# Patient Record
Sex: MALE | Race: BLACK OR AFRICAN AMERICAN | Employment: OTHER | URBAN - METROPOLITAN AREA
[De-identification: names, ages, dates, MRNs, and addresses within clinical notes are randomized per-mention and may not be internally consistent; named-entity substitution may affect disease eponyms.]

---

## 2019-02-05 ENCOUNTER — OFFICE VISIT (OUTPATIENT)
Dept: ENT CLINIC | Age: 64
End: 2019-02-05
Payer: COMMERCIAL

## 2019-02-05 VITALS
HEART RATE: 69 BPM | HEIGHT: 70 IN | SYSTOLIC BLOOD PRESSURE: 127 MMHG | WEIGHT: 241.8 LBS | DIASTOLIC BLOOD PRESSURE: 79 MMHG | BODY MASS INDEX: 34.62 KG/M2 | TEMPERATURE: 98.1 F

## 2019-02-05 DIAGNOSIS — H61.23 BILATERAL IMPACTED CERUMEN: ICD-10-CM

## 2019-02-05 DIAGNOSIS — R22.1 NECK SWELLING: Primary | ICD-10-CM

## 2019-02-05 PROCEDURE — G8427 DOCREV CUR MEDS BY ELIG CLIN: HCPCS | Performed by: OTOLARYNGOLOGY

## 2019-02-05 PROCEDURE — G8417 CALC BMI ABV UP PARAM F/U: HCPCS | Performed by: OTOLARYNGOLOGY

## 2019-02-05 PROCEDURE — 99202 OFFICE O/P NEW SF 15 MIN: CPT | Performed by: OTOLARYNGOLOGY

## 2019-02-05 PROCEDURE — 1036F TOBACCO NON-USER: CPT | Performed by: OTOLARYNGOLOGY

## 2019-02-05 PROCEDURE — 69210 REMOVE IMPACTED EAR WAX UNI: CPT | Performed by: OTOLARYNGOLOGY

## 2019-02-05 PROCEDURE — 3017F COLORECTAL CA SCREEN DOC REV: CPT | Performed by: OTOLARYNGOLOGY

## 2019-02-05 PROCEDURE — G8484 FLU IMMUNIZE NO ADMIN: HCPCS | Performed by: OTOLARYNGOLOGY

## 2019-02-05 RX ORDER — OMEGA-3 FATTY ACIDS/FISH OIL 300-1000MG
1 CAPSULE ORAL DAILY
COMMUNITY

## 2019-02-05 RX ORDER — SENNOSIDES 8.8 MG/5ML
5 LIQUID ORAL NIGHTLY
COMMUNITY
End: 2019-08-08 | Stop reason: CLARIF

## 2019-02-05 RX ORDER — DIAPER,BRIEF,INFANT-TODD,DISP
EACH MISCELLANEOUS
COMMUNITY

## 2019-02-05 ASSESSMENT — ENCOUNTER SYMPTOMS
TROUBLE SWALLOWING: 0
PHOTOPHOBIA: 0
SINUS PRESSURE: 0
SORE THROAT: 0
CHOKING: 0
WHEEZING: 0
FACIAL SWELLING: 0
RHINORRHEA: 0
EYE ITCHING: 0
COUGH: 0
BACK PAIN: 0
SHORTNESS OF BREATH: 0
SINUS PAIN: 0
BLOOD IN STOOL: 0
STRIDOR: 0
DIARRHEA: 0
COLOR CHANGE: 0
VOMITING: 0
VOICE CHANGE: 0
EYE DISCHARGE: 0
CONSTIPATION: 0
NAUSEA: 0

## 2019-08-08 ENCOUNTER — HOSPITAL ENCOUNTER (INPATIENT)
Age: 64
LOS: 5 days | Discharge: HOME | DRG: 208 | End: 2019-08-13
Attending: EMERGENCY MEDICINE | Admitting: INTERNAL MEDICINE
Payer: COMMERCIAL

## 2019-08-08 ENCOUNTER — APPOINTMENT (OUTPATIENT)
Dept: GENERAL RADIOLOGY | Age: 64
DRG: 208 | End: 2019-08-08
Payer: COMMERCIAL

## 2019-08-08 ENCOUNTER — APPOINTMENT (OUTPATIENT)
Dept: CT IMAGING | Age: 64
DRG: 208 | End: 2019-08-08
Payer: COMMERCIAL

## 2019-08-08 DIAGNOSIS — J96.22 ACUTE ON CHRONIC RESPIRATORY FAILURE WITH HYPERCAPNIA (HCC): Primary | ICD-10-CM

## 2019-08-08 DIAGNOSIS — J96.02 ACUTE HYPERCAPNIC RESPIRATORY FAILURE (HCC): ICD-10-CM

## 2019-08-08 DIAGNOSIS — R41.82 ALTERED MENTAL STATUS, UNSPECIFIED ALTERED MENTAL STATUS TYPE: ICD-10-CM

## 2019-08-08 PROBLEM — J96.90 RESPIRATORY FAILURE (HCC): Status: ACTIVE | Noted: 2019-08-08

## 2019-08-08 LAB
ALBUMIN SERPL-MCNC: 3.7 G/DL (ref 3.4–5)
ALP BLD-CCNC: 84 U/L (ref 40–129)
ALT SERPL-CCNC: 53 U/L (ref 10–40)
AMMONIA: 76 UMOL/L (ref 16–60)
AMPHETAMINE SCREEN, URINE: ABNORMAL
ANION GAP SERPL CALCULATED.3IONS-SCNC: 6 MMOL/L (ref 3–16)
ANION GAP SERPL CALCULATED.3IONS-SCNC: 7 MMOL/L (ref 3–16)
AST SERPL-CCNC: 23 U/L (ref 15–37)
BACTERIA: ABNORMAL /HPF
BARBITURATE SCREEN URINE: ABNORMAL
BASE EXCESS ARTERIAL: 10 (ref -3–3)
BASE EXCESS ARTERIAL: 11 (ref -3–3)
BASE EXCESS ARTERIAL: 11 (ref -3–3)
BASE EXCESS ARTERIAL: 12 (ref -3–3)
BASE EXCESS ARTERIAL: 5 (ref -3–3)
BASE EXCESS ARTERIAL: 7 (ref -3–3)
BASE EXCESS VENOUS: 10 (ref -3–3)
BASE EXCESS VENOUS: 6 (ref -3–3)
BASE EXCESS VENOUS: 6 (ref -3–3)
BASOPHILS ABSOLUTE: 0 K/UL (ref 0–0.2)
BASOPHILS RELATIVE PERCENT: 0.3 %
BENZODIAZEPINE SCREEN, URINE: ABNORMAL
BILIRUB SERPL-MCNC: <0.2 MG/DL (ref 0–1)
BILIRUBIN DIRECT: <0.2 MG/DL (ref 0–0.3)
BILIRUBIN URINE: NEGATIVE
BILIRUBIN, INDIRECT: ABNORMAL MG/DL (ref 0–1)
BLOOD, URINE: NEGATIVE
BUN BLDV-MCNC: 19 MG/DL (ref 7–20)
BUN BLDV-MCNC: 19 MG/DL (ref 7–20)
CALCIUM IONIZED: 1.29 MMOL/L (ref 1.12–1.32)
CALCIUM IONIZED: 1.34 MMOL/L (ref 1.12–1.32)
CALCIUM IONIZED: 1.35 MMOL/L (ref 1.12–1.32)
CALCIUM IONIZED: 1.36 MMOL/L (ref 1.12–1.32)
CALCIUM IONIZED: 1.37 MMOL/L (ref 1.12–1.32)
CALCIUM SERPL-MCNC: 9.3 MG/DL (ref 8.3–10.6)
CALCIUM SERPL-MCNC: 9.7 MG/DL (ref 8.3–10.6)
CANNABINOID SCREEN URINE: ABNORMAL
CHLORIDE BLD-SCNC: 103 MMOL/L (ref 99–110)
CHLORIDE BLD-SCNC: 106 MMOL/L (ref 99–110)
CLARITY: CLEAR
CO2: 31 MMOL/L (ref 21–32)
CO2: 32 MMOL/L (ref 21–32)
COCAINE METABOLITE SCREEN URINE: ABNORMAL
COLOR: YELLOW
CORTISOL TOTAL: 18.7 UG/DL
CREAT SERPL-MCNC: 1.2 MG/DL (ref 0.8–1.3)
CREAT SERPL-MCNC: 1.3 MG/DL (ref 0.8–1.3)
EKG ATRIAL RATE: 63 BPM
EKG DIAGNOSIS: NORMAL
EKG P AXIS: 63 DEGREES
EKG P-R INTERVAL: 190 MS
EKG Q-T INTERVAL: 566 MS
EKG QRS DURATION: 238 MS
EKG QTC CALCULATION (BAZETT): 579 MS
EKG R AXIS: 269 DEGREES
EKG T AXIS: 39 DEGREES
EKG VENTRICULAR RATE: 63 BPM
EOSINOPHILS ABSOLUTE: 0.1 K/UL (ref 0–0.6)
EOSINOPHILS RELATIVE PERCENT: 2.2 %
GFR AFRICAN AMERICAN: >60
GFR AFRICAN AMERICAN: >60
GFR NON-AFRICAN AMERICAN: 55
GFR NON-AFRICAN AMERICAN: >60
GLUCOSE BLD-MCNC: 124 MG/DL (ref 70–99)
GLUCOSE BLD-MCNC: 126 MG/DL (ref 70–99)
GLUCOSE BLD-MCNC: 128 MG/DL (ref 70–99)
GLUCOSE BLD-MCNC: 128 MG/DL (ref 70–99)
GLUCOSE BLD-MCNC: 129 MG/DL (ref 70–99)
GLUCOSE BLD-MCNC: 132 MG/DL (ref 70–99)
GLUCOSE BLD-MCNC: 145 MG/DL (ref 70–99)
GLUCOSE URINE: NEGATIVE MG/DL
HCO3 ARTERIAL: 33 MMOL/L (ref 21–29)
HCO3 ARTERIAL: 35.1 MMOL/L (ref 21–29)
HCO3 ARTERIAL: 37 MMOL/L (ref 21–29)
HCO3 ARTERIAL: 38.5 MMOL/L (ref 21–29)
HCO3 ARTERIAL: 38.6 MMOL/L (ref 21–29)
HCO3 ARTERIAL: 39.5 MMOL/L (ref 21–29)
HCO3 VENOUS: 33.1 MMOL/L (ref 23–29)
HCO3 VENOUS: 33.7 MMOL/L (ref 23–29)
HCO3 VENOUS: 37.5 MMOL/L (ref 23–29)
HCT VFR BLD CALC: 37.5 % (ref 40.5–52.5)
HEMOGLOBIN: 11.5 G/DL (ref 13.5–17.5)
KETONES, URINE: NEGATIVE MG/DL
LACTATE: 0.39 MMOL/L (ref 0.4–2)
LACTATE: 0.44 MMOL/L (ref 0.4–2)
LACTATE: 0.45 MMOL/L (ref 0.4–2)
LACTATE: 0.58 MMOL/L (ref 0.4–2)
LACTATE: 0.71 MMOL/L (ref 0.4–2)
LACTATE: 0.96 MMOL/L (ref 0.4–2)
LACTATE: 0.98 MMOL/L (ref 0.4–2)
LEUKOCYTE ESTERASE, URINE: NEGATIVE
LYMPHOCYTES ABSOLUTE: 1 K/UL (ref 1–5.1)
LYMPHOCYTES RELATIVE PERCENT: 15.6 %
Lab: ABNORMAL
MCH RBC QN AUTO: 29 PG (ref 26–34)
MCHC RBC AUTO-ENTMCNC: 30.6 G/DL (ref 31–36)
MCV RBC AUTO: 94.9 FL (ref 80–100)
METHADONE SCREEN, URINE: ABNORMAL
MICROSCOPIC EXAMINATION: YES
MONOCYTES ABSOLUTE: 0.4 K/UL (ref 0–1.3)
MONOCYTES RELATIVE PERCENT: 6.5 %
NEUTROPHILS ABSOLUTE: 4.9 K/UL (ref 1.7–7.7)
NEUTROPHILS RELATIVE PERCENT: 75.4 %
NITRITE, URINE: NEGATIVE
O2 SAT, ARTERIAL: 95 % (ref 93–100)
O2 SAT, ARTERIAL: 95 % (ref 93–100)
O2 SAT, ARTERIAL: 97 % (ref 93–100)
O2 SAT, ARTERIAL: 98 % (ref 93–100)
O2 SAT, ARTERIAL: 98 % (ref 93–100)
O2 SAT, ARTERIAL: 99 % (ref 93–100)
O2 SAT, VEN: 58 %
O2 SAT, VEN: 79 %
O2 SAT, VEN: 82 %
OPIATE SCREEN URINE: ABNORMAL
OXYCODONE URINE: POSITIVE
PCO2 ARTERIAL: 85.9 MM HG (ref 35–45)
PCO2 ARTERIAL: 86.2 MM HG (ref 35–45)
PCO2 ARTERIAL: 87.6 MM HG (ref 35–45)
PCO2 ARTERIAL: 91.7 MM HG (ref 35–45)
PCO2 ARTERIAL: 92.4 MM HG (ref 35–45)
PCO2 ARTERIAL: 96.7 MM HG (ref 35–45)
PCO2, VEN: 77.2 MM HG (ref 40–50)
PCO2, VEN: 84.6 MM HG (ref 40–50)
PCO2, VEN: 88.2 MM HG (ref 40–50)
PDW BLD-RTO: 16.2 % (ref 12.4–15.4)
PERFORMED ON: ABNORMAL
PH ARTERIAL: 7.18 (ref 7.35–7.45)
PH ARTERIAL: 7.22 (ref 7.35–7.45)
PH ARTERIAL: 7.22 (ref 7.35–7.45)
PH ARTERIAL: 7.23 (ref 7.35–7.45)
PH ARTERIAL: 7.23 (ref 7.35–7.45)
PH ARTERIAL: 7.24 (ref 7.35–7.45)
PH UA: 6
PH UA: 6 (ref 5–8)
PH VENOUS: 7.21 (ref 7.35–7.45)
PH VENOUS: 7.24 (ref 7.35–7.45)
PH VENOUS: 7.24 (ref 7.35–7.45)
PHENCYCLIDINE SCREEN URINE: ABNORMAL
PLATELET # BLD: 234 K/UL (ref 135–450)
PMV BLD AUTO: 9.7 FL (ref 5–10.5)
PO2 ARTERIAL: 100 MM HG (ref 75–108)
PO2 ARTERIAL: 119.7 MM HG (ref 75–108)
PO2 ARTERIAL: 127.8 MM HG (ref 75–108)
PO2 ARTERIAL: 135.4 MM HG (ref 75–108)
PO2 ARTERIAL: 170.1 MM HG (ref 75–108)
PO2 ARTERIAL: 99.3 MM HG (ref 75–108)
PO2, VEN: 37 MM HG
PO2, VEN: 54 MM HG
PO2, VEN: 59 MM HG
POC POTASSIUM: 5.1 MMOL/L (ref 3.5–5.1)
POC POTASSIUM: 5.1 MMOL/L (ref 3.5–5.1)
POC POTASSIUM: 5.3 MMOL/L (ref 3.5–5.1)
POC POTASSIUM: 5.5 MMOL/L (ref 3.5–5.1)
POC POTASSIUM: 5.7 MMOL/L (ref 3.5–5.1)
POC SAMPLE TYPE: ABNORMAL
POC SODIUM: 144 MMOL/L (ref 136–145)
POC SODIUM: 144 MMOL/L (ref 136–145)
POC SODIUM: 145 MMOL/L (ref 136–145)
POC SODIUM: 146 MMOL/L (ref 136–145)
POC SODIUM: 147 MMOL/L (ref 136–145)
POTASSIUM REFLEX MAGNESIUM: 5.8 MMOL/L (ref 3.5–5.1)
POTASSIUM SERPL-SCNC: 5.4 MMOL/L (ref 3.5–5.1)
PRO-BNP: 275 PG/ML (ref 0–124)
PROPOXYPHENE SCREEN: ABNORMAL
PROTEIN UA: ABNORMAL MG/DL
RBC # BLD: 3.95 M/UL (ref 4.2–5.9)
RBC UA: ABNORMAL /HPF (ref 0–2)
SODIUM BLD-SCNC: 141 MMOL/L (ref 136–145)
SODIUM BLD-SCNC: 144 MMOL/L (ref 136–145)
SPECIFIC GRAVITY UA: 1.02 (ref 1–1.03)
TCO2 ARTERIAL: 36 MMOL/L
TCO2 ARTERIAL: 38 MMOL/L
TCO2 ARTERIAL: 40 MMOL/L
TCO2 ARTERIAL: 41 MMOL/L
TCO2 ARTERIAL: 41 MMOL/L
TCO2 ARTERIAL: 43 MMOL/L
TCO2 CALC VENOUS: 35 MMOL/L
TCO2 CALC VENOUS: 36 MMOL/L
TCO2 CALC VENOUS: 40 MMOL/L
TOTAL PROTEIN: 7.5 G/DL (ref 6.4–8.2)
TROPONIN: <0.01 NG/ML
URINE TYPE: ABNORMAL
UROBILINOGEN, URINE: 0.2 E.U./DL
WBC # BLD: 6.5 K/UL (ref 4–11)
WBC UA: ABNORMAL /HPF (ref 0–5)

## 2019-08-08 PROCEDURE — 2580000003 HC RX 258: Performed by: STUDENT IN AN ORGANIZED HEALTH CARE EDUCATION/TRAINING PROGRAM

## 2019-08-08 PROCEDURE — 31500 INSERT EMERGENCY AIRWAY: CPT

## 2019-08-08 PROCEDURE — 6370000000 HC RX 637 (ALT 250 FOR IP): Performed by: STUDENT IN AN ORGANIZED HEALTH CARE EDUCATION/TRAINING PROGRAM

## 2019-08-08 PROCEDURE — 83880 ASSAY OF NATRIURETIC PEPTIDE: CPT

## 2019-08-08 PROCEDURE — 82803 BLOOD GASES ANY COMBINATION: CPT

## 2019-08-08 PROCEDURE — 94640 AIRWAY INHALATION TREATMENT: CPT

## 2019-08-08 PROCEDURE — 2700000000 HC OXYGEN THERAPY PER DAY

## 2019-08-08 PROCEDURE — 6360000002 HC RX W HCPCS: Performed by: STUDENT IN AN ORGANIZED HEALTH CARE EDUCATION/TRAINING PROGRAM

## 2019-08-08 PROCEDURE — 36415 COLL VENOUS BLD VENIPUNCTURE: CPT

## 2019-08-08 PROCEDURE — 71045 X-RAY EXAM CHEST 1 VIEW: CPT

## 2019-08-08 PROCEDURE — 96374 THER/PROPH/DIAG INJ IV PUSH: CPT

## 2019-08-08 PROCEDURE — 80307 DRUG TEST PRSMV CHEM ANLYZR: CPT

## 2019-08-08 PROCEDURE — 82140 ASSAY OF AMMONIA: CPT

## 2019-08-08 PROCEDURE — 99291 CRITICAL CARE FIRST HOUR: CPT

## 2019-08-08 PROCEDURE — 84484 ASSAY OF TROPONIN QUANT: CPT

## 2019-08-08 PROCEDURE — 84443 ASSAY THYROID STIM HORMONE: CPT

## 2019-08-08 PROCEDURE — 70450 CT HEAD/BRAIN W/O DYE: CPT

## 2019-08-08 PROCEDURE — 82533 TOTAL CORTISOL: CPT

## 2019-08-08 PROCEDURE — 94660 CPAP INITIATION&MGMT: CPT

## 2019-08-08 PROCEDURE — 2000000000 HC ICU R&B

## 2019-08-08 PROCEDURE — 84132 ASSAY OF SERUM POTASSIUM: CPT

## 2019-08-08 PROCEDURE — 94770 HC ETCO2 MONITOR DAILY: CPT

## 2019-08-08 PROCEDURE — 6360000002 HC RX W HCPCS

## 2019-08-08 PROCEDURE — 2580000003 HC RX 258: Performed by: EMERGENCY MEDICINE

## 2019-08-08 PROCEDURE — 87040 BLOOD CULTURE FOR BACTERIA: CPT

## 2019-08-08 PROCEDURE — 85025 COMPLETE CBC W/AUTO DIFF WBC: CPT

## 2019-08-08 PROCEDURE — 80076 HEPATIC FUNCTION PANEL: CPT

## 2019-08-08 PROCEDURE — 82947 ASSAY GLUCOSE BLOOD QUANT: CPT

## 2019-08-08 PROCEDURE — 93005 ELECTROCARDIOGRAM TRACING: CPT | Performed by: EMERGENCY MEDICINE

## 2019-08-08 PROCEDURE — 94761 N-INVAS EAR/PLS OXIMETRY MLT: CPT

## 2019-08-08 PROCEDURE — 84295 ASSAY OF SERUM SODIUM: CPT

## 2019-08-08 PROCEDURE — 81001 URINALYSIS AUTO W/SCOPE: CPT

## 2019-08-08 PROCEDURE — 2500000003 HC RX 250 WO HCPCS: Performed by: STUDENT IN AN ORGANIZED HEALTH CARE EDUCATION/TRAINING PROGRAM

## 2019-08-08 PROCEDURE — 82330 ASSAY OF CALCIUM: CPT

## 2019-08-08 PROCEDURE — 6360000002 HC RX W HCPCS: Performed by: EMERGENCY MEDICINE

## 2019-08-08 PROCEDURE — 80048 BASIC METABOLIC PNL TOTAL CA: CPT

## 2019-08-08 PROCEDURE — 83605 ASSAY OF LACTIC ACID: CPT

## 2019-08-08 PROCEDURE — 99291 CRITICAL CARE FIRST HOUR: CPT | Performed by: INTERNAL MEDICINE

## 2019-08-08 PROCEDURE — 36600 WITHDRAWAL OF ARTERIAL BLOOD: CPT

## 2019-08-08 PROCEDURE — 94002 VENT MGMT INPAT INIT DAY: CPT

## 2019-08-08 RX ORDER — NALOXONE HYDROCHLORIDE 1 MG/ML
INJECTION INTRAMUSCULAR; INTRAVENOUS; SUBCUTANEOUS
Status: COMPLETED
Start: 2019-08-08 | End: 2019-08-08

## 2019-08-08 RX ORDER — KETOCONAZOLE 20 MG/G
CREAM TOPICAL DAILY
COMMUNITY

## 2019-08-08 RX ORDER — NALOXONE HYDROCHLORIDE 1 MG/ML
2 INJECTION INTRAMUSCULAR; INTRAVENOUS; SUBCUTANEOUS ONCE
Status: COMPLETED | OUTPATIENT
Start: 2019-08-08 | End: 2019-08-08

## 2019-08-08 RX ORDER — NALOXONE HYDROCHLORIDE 0.4 MG/ML
0.4 INJECTION, SOLUTION INTRAMUSCULAR; INTRAVENOUS; SUBCUTANEOUS ONCE
Status: COMPLETED | OUTPATIENT
Start: 2019-08-08 | End: 2019-08-08

## 2019-08-08 RX ORDER — NALOXONE HYDROCHLORIDE 1 MG/ML
2 INJECTION INTRAMUSCULAR; INTRAVENOUS; SUBCUTANEOUS ONCE
Status: DISCONTINUED | OUTPATIENT
Start: 2019-08-08 | End: 2019-08-08

## 2019-08-08 RX ORDER — SODIUM CHLORIDE 9 MG/ML
INJECTION, SOLUTION INTRAVENOUS
Status: DISCONTINUED
Start: 2019-08-08 | End: 2019-08-08

## 2019-08-08 RX ORDER — KETOCONAZOLE 20 MG/ML
SHAMPOO TOPICAL
COMMUNITY

## 2019-08-08 RX ORDER — CALCIUM CARBONATE 200(500)MG
1 TABLET,CHEWABLE ORAL DAILY
Status: ON HOLD | COMMUNITY
End: 2020-10-16 | Stop reason: HOSPADM

## 2019-08-08 RX ORDER — NALOXONE HYDROCHLORIDE 1 MG/ML
1 INJECTION INTRAMUSCULAR; INTRAVENOUS; SUBCUTANEOUS ONCE
Status: COMPLETED | OUTPATIENT
Start: 2019-08-08 | End: 2019-08-08

## 2019-08-08 RX ORDER — TROLAMINE SALICYLATE 10 G/100G
CREAM TOPICAL
COMMUNITY

## 2019-08-08 RX ORDER — ACETAMINOPHEN 325 MG/1
650 TABLET ORAL EVERY 6 HOURS PRN
Status: ON HOLD | COMMUNITY
End: 2020-10-30 | Stop reason: CLARIF

## 2019-08-08 RX ORDER — SODIUM CHLORIDE 0.9 % (FLUSH) 0.9 %
10 SYRINGE (ML) INJECTION PRN
Status: DISCONTINUED | OUTPATIENT
Start: 2019-08-08 | End: 2019-08-13 | Stop reason: HOSPADM

## 2019-08-08 RX ORDER — NALOXONE HYDROCHLORIDE 1 MG/ML
1 INJECTION INTRAMUSCULAR; INTRAVENOUS; SUBCUTANEOUS
Status: DISCONTINUED | OUTPATIENT
Start: 2019-08-08 | End: 2019-08-08

## 2019-08-08 RX ORDER — IPRATROPIUM BROMIDE AND ALBUTEROL SULFATE 2.5; .5 MG/3ML; MG/3ML
1 SOLUTION RESPIRATORY (INHALATION) EVERY 4 HOURS
Status: DISCONTINUED | OUTPATIENT
Start: 2019-08-08 | End: 2019-08-11

## 2019-08-08 RX ORDER — METHYLPREDNISOLONE SODIUM SUCCINATE 125 MG/2ML
125 INJECTION, POWDER, LYOPHILIZED, FOR SOLUTION INTRAMUSCULAR; INTRAVENOUS ONCE
Status: COMPLETED | OUTPATIENT
Start: 2019-08-08 | End: 2019-08-08

## 2019-08-08 RX ORDER — PROPOFOL 10 MG/ML
10 INJECTION, EMULSION INTRAVENOUS
Status: DISCONTINUED | OUTPATIENT
Start: 2019-08-08 | End: 2019-08-10

## 2019-08-08 RX ORDER — SENNA PLUS 8.6 MG/1
2 TABLET ORAL EVERY EVENING
COMMUNITY

## 2019-08-08 RX ORDER — PROPOFOL 10 MG/ML
INJECTION, EMULSION INTRAVENOUS
Status: COMPLETED
Start: 2019-08-08 | End: 2019-08-08

## 2019-08-08 RX ORDER — NALOXONE HYDROCHLORIDE 1 MG/ML
1 INJECTION INTRAMUSCULAR; INTRAVENOUS; SUBCUTANEOUS ONCE
Status: DISCONTINUED | OUTPATIENT
Start: 2019-08-08 | End: 2019-08-08

## 2019-08-08 RX ORDER — NALOXONE HYDROCHLORIDE 0.4 MG/ML
INJECTION, SOLUTION INTRAMUSCULAR; INTRAVENOUS; SUBCUTANEOUS
Status: COMPLETED
Start: 2019-08-08 | End: 2019-08-08

## 2019-08-08 RX ORDER — FUROSEMIDE 10 MG/ML
20 INJECTION INTRAMUSCULAR; INTRAVENOUS ONCE
Status: COMPLETED | OUTPATIENT
Start: 2019-08-08 | End: 2019-08-08

## 2019-08-08 RX ORDER — ONDANSETRON 2 MG/ML
4 INJECTION INTRAMUSCULAR; INTRAVENOUS EVERY 6 HOURS PRN
Status: DISCONTINUED | OUTPATIENT
Start: 2019-08-08 | End: 2019-08-13 | Stop reason: HOSPADM

## 2019-08-08 RX ORDER — SODIUM CHLORIDE 0.9 % (FLUSH) 0.9 %
10 SYRINGE (ML) INJECTION EVERY 12 HOURS SCHEDULED
Status: DISCONTINUED | OUTPATIENT
Start: 2019-08-08 | End: 2019-08-13 | Stop reason: HOSPADM

## 2019-08-08 RX ORDER — METHYLPREDNISOLONE SODIUM SUCCINATE 40 MG/ML
40 INJECTION, POWDER, LYOPHILIZED, FOR SOLUTION INTRAMUSCULAR; INTRAVENOUS DAILY
Status: DISCONTINUED | OUTPATIENT
Start: 2019-08-09 | End: 2019-08-11

## 2019-08-08 RX ORDER — FLUOCINONIDE TOPICAL SOLUTION USP, 0.05% 0.5 MG/ML
SOLUTION TOPICAL
COMMUNITY

## 2019-08-08 RX ORDER — NALOXONE HYDROCHLORIDE 1 MG/ML
4 INJECTION INTRAMUSCULAR; INTRAVENOUS; SUBCUTANEOUS ONCE
Status: COMPLETED | OUTPATIENT
Start: 2019-08-08 | End: 2019-08-08

## 2019-08-08 RX ADMIN — Medication 10 ML: at 20:04

## 2019-08-08 RX ADMIN — NALOXONE HYDROCHLORIDE 2 MG: 1 INJECTION PARENTERAL at 10:15

## 2019-08-08 RX ADMIN — PROPOFOL 200 MG: 10 INJECTION, EMULSION INTRAVENOUS at 23:05

## 2019-08-08 RX ADMIN — NALOXONE HYDROCHLORIDE 2 MG/HR: 1 INJECTION PARENTERAL at 12:51

## 2019-08-08 RX ADMIN — NALOXONE HYDROCHLORIDE 1 MG: 1 INJECTION PARENTERAL at 18:04

## 2019-08-08 RX ADMIN — NALOXONE HYDROCHLORIDE 0.4 MG: 0.4 INJECTION, SOLUTION INTRAMUSCULAR; INTRAVENOUS; SUBCUTANEOUS at 11:44

## 2019-08-08 RX ADMIN — IPRATROPIUM BROMIDE AND ALBUTEROL SULFATE 1 AMPULE: .5; 3 SOLUTION RESPIRATORY (INHALATION) at 19:58

## 2019-08-08 RX ADMIN — FUROSEMIDE 20 MG: 10 INJECTION, SOLUTION INTRAMUSCULAR; INTRAVENOUS at 16:32

## 2019-08-08 RX ADMIN — NALOXONE HYDROCHLORIDE 4 MG: 1 INJECTION PARENTERAL at 20:03

## 2019-08-08 RX ADMIN — NALOXONE HYDROCHLORIDE 1 MG: 1 INJECTION PARENTERAL at 13:25

## 2019-08-08 RX ADMIN — NALOXONE HYDROCHLORIDE 0.4 MG: 0.4 INJECTION, SOLUTION INTRAMUSCULAR; INTRAVENOUS; SUBCUTANEOUS at 11:51

## 2019-08-08 RX ADMIN — METHYLPREDNISOLONE SODIUM SUCCINATE 125 MG: 125 INJECTION, POWDER, FOR SOLUTION INTRAMUSCULAR; INTRAVENOUS at 20:04

## 2019-08-08 RX ADMIN — NALOXONE HYDROCHLORIDE 1 MG: 1 INJECTION INTRAMUSCULAR; INTRAVENOUS; SUBCUTANEOUS at 13:25

## 2019-08-08 RX ADMIN — PROPOFOL 50 MCG/KG/MIN: 10 INJECTION, EMULSION INTRAVENOUS at 23:02

## 2019-08-08 RX ADMIN — DEXMEDETOMIDINE 1.4 MCG/KG/HR: 100 INJECTION, SOLUTION, CONCENTRATE INTRAVENOUS at 23:08

## 2019-08-08 RX ADMIN — NALOXONE HYDROCHLORIDE 0.5 MG/HR: 0.4 INJECTION, SOLUTION INTRAMUSCULAR; INTRAVENOUS; SUBCUTANEOUS at 19:31

## 2019-08-08 RX ADMIN — ENOXAPARIN SODIUM 40 MG: 40 INJECTION SUBCUTANEOUS at 16:32

## 2019-08-08 RX ADMIN — NALOXONE HYDROCHLORIDE 2 MG: 1 INJECTION PARENTERAL at 09:44

## 2019-08-08 RX ADMIN — NALOXONE HYDROCHLORIDE 1 MG: 1 INJECTION PARENTERAL at 17:43

## 2019-08-08 ASSESSMENT — PULMONARY FUNCTION TESTS: PIF_VALUE: 23

## 2019-08-08 ASSESSMENT — PAIN SCALES - GENERAL
PAINLEVEL_OUTOF10: 0

## 2019-08-08 NOTE — ED PROVIDER NOTES
ED Attending Attestation Note     Date of evaluation: 8/8/2019    This patient was seen by the resident. I have seen and examined the patient, agree with the workup, evaluation, management and diagnosis. The care plan has been discussed. I have reviewed the ECG and concur with the resident's interpretation. On arrival he is not responsive to pain, his pinpoint pupils, and on a nonrebreather oxygen saturation of 100% with otherwise stable vital signs. Medical record from the nursing home shows no opiates, benzos, though he does have multiple psychiatric meds which can cause lethargy. 2 mg of intranasal Narcan were given and patient had improvement in pupil size and responsiveness for approximately 5 to 10 minutes. Broad work-up initiated including EKG, labs, imaging, and urine drug screen. Critical Care:  Due to the immediate potential for life-threatening deterioration due to AMS, I spent 35 minutes providing critical care. This time excludes time spent performing procedures but includes time spent on direct patient care, history retrieval, review of the chart, and discussions with patient, family, and consultant(s).           Bryan Oliver MD  08/08/19 0014
mmol/L    Lactate 0.98 0.40 - 2.00 mmol/L    Sample Type MANDY     Performed on SEE BELOW    POCT Venous   Result Value Ref Range    pH, Mandy 7.208 (L) 7.350 - 7.450    pCO2, Mandy 84.6 (H) 40.0 - 50.0 mm Hg    pO2, Mandy 59 Not Established mm Hg    HCO3, Venous 33.7 (H) 23.0 - 29.0 mmol/L    Base Excess, Mandy 6 (H) -3 - 3    O2 Sat, Mandy 82 Not Established %    TC02 (Calc), Mandy 36 Not Established mmol/L    Lactate 0.96 0.40 - 2.00 mmol/L    Sample Type MANDY     Performed on SEE BELOW      RECENT VITALS:  BP: (!) 175/100, Temp: 98.4 °F (36.9 °C), Pulse: 60,Resp: 18, SpO2: 100 %     Procedures     None    ED Course     Nursing Notes, Past Medical Hx, Past Surgical Hx, Social Hx, Allergies, and Family Hx were reviewed. The patient was given the followingmedications:  Orders Placed This Encounter   Medications    naloxone (NARCAN) injection 2 mg    naloxone (NARCAN) injection 2 mg    naloxone (NARCAN) injection 0.4 mg    naloxone (NARCAN) 0.4 MG/ML injection     AUDREY KEEN: cabinet override    naloxone (NARCAN) injection 0.4 mg    naloxone (NARCAN) 0.4 MG/ML injection     AUDREY KEEN: cabinet override    naloxone (NARCAN) 2 mg in sodium chloride 0.9 % 500 mL infusion     CONSULTS:  Norbert Stovall / JULIO CESAR / Dileep Burkitt is a 71 y.o. male with PMHx of CAD, depression, diabetes, HTN, GERD, paranoid schizophrenia who presented from SNF with altered mental status. Last known normal time was last night. On exam, patient groaned in response to painful stimuli, and had frequent periods of self-limited apnea. Initial VBG showed pH 7.236, PCO2 88.2, HCO3 37.5, base excess of 10. Patient was given IN Narcan 2 mg x2 w/ brisk but transient response. He was started on BiPAP at 12/5. He continued to have short periods of apnea, however his SPO2 remained at 100%. CBC showed no leukocytosis and stable anemia.   BMP showed creatinine of 1.2, glucose of 128, and

## 2019-08-08 NOTE — ED NOTES
Home Med List is complete.   Source of medications in list is list provided by Jessica 38 states patient took no meds today.        Geneva Veloz  PharmD Candidate 2022 8/8/2019 1:07 PM

## 2019-08-08 NOTE — PROGRESS NOTES
2 RNs attempted to get BMP and were unsuccessful. Called phlebotomist to draw Pt. They stated they would be here when they can.

## 2019-08-08 NOTE — ED NOTES
Report called to Sedalia Meigs receiving RN for Kaye 04, [facility]  08/08/19 2479

## 2019-08-08 NOTE — CONSULTS
results for input(s): CHOL, HDL in the last 72 hours. Invalid input(s): LDLCALCU, TRIGLYCERIDE  INR: No results for input(s): INR in the last 72 hours. Lactate:   Recent Labs     08/08/19  0951 08/08/19  1100   LACTATE 0.98 0.96     ABGs:  Recent Labs     08/08/19  1345   PHART 7.184*   ADM0RHI 87.6*   PO2ART 100.0   FIM9UVY 33.0*   BEART 5*   Z5CZUIRK 95   SWF6WWW 36       UA:  Recent Labs     08/08/19  1014   COLORU Yellow   PHUR 6.0  6.0   WBCUA 0-2   RBCUA 0-2   BACTERIA Rare*   CLARITYU Clear   SPECGRAV 1.020   LEUKOCYTESUR Negative   UROBILINOGEN 0.2   BILIRUBINUR Negative   BLOODU Negative   GLUCOSEU Negative        IMAGING:  CT Head WO Contrast   Final Result      1. No acute intracranial hemorrhage or mass effect. 2. Small vague focus of low attenuation anteriorly within the left extreme capsule which could indicate ischemic injury of indeterminate age. 3. Mild cortical atrophy. XR CHEST PORTABLE   Final Result   Impression: Mild pulmonary vascular indistinctness, which can be seen in the setting of mild edema. Poor inspiratory volumes resulting in crowding of the bronchovascular markings. Assessment & Plan:    Emerson Johnson is a 71 y.o. male with PMHx of paranoid schizophrenia, CAD, depression, DM2, and sick sinus syndrome s/p PM who was admitted for acute respiratory failure. Neruo/Psyc  Paranoid schizophrenia   At home on olanzapine and oxcarbazepine. - continue olanzapine and oxcarbazepine    Cardiology  Sick sinus syndrome s/p pacemaker placement  - continue tele monitoring    Hx of HTN with CHF  Echo (7/27/19) with 40-45% EF.  - continue carvedilol and lisinopril  - strict I/O; may benefit from lasix    Respiratory  Acute respiratory failure   On admission VBG 7.236/88.2, pt placed on BiPAP; VBG worsening with subsequent ABG 7.184/87/100. BiPAP (IPAP/EPAP changed from 12/5 to 15/6). Repeat ABG 7.218/86.2/119.7. Received 4.8 mg narcan.   - continue BiPAP  - keep HOB >30deg  - continue q1hr 1mg narcan  - titrate SpO2 >92%  - keep NPO  - avoid benzodiazepine    Nephrology  Hx of CKD 3   - daily BMP    Endocrinology  DM2  A1C 6.2 (3/21/19). Diet controled. - POCT glu qAC and HS  - carb contol diet       Code Status: No Order  FEN: IVF: none; No diet orders on file  PPX: Heparin   DISPO: ICU      This patient will be discussed with attending, Dr Latisha Vázquez MD.    Karla Almeida MD, PGY- 2  Contact via [address] [place] [address]  8/8/2019,  2:37 PM    Pulmonary/critical care    Patient seen and examined. I agree with Dr. Wilmar Mukherjee history, physical, lab findings, assessment and plan. Patient admitted to the ICU for acute encephalopathy. He has acute on chronic hypercapnic respiratory failure. Tox screen was positive for oxycodone. Narcan has been semi-effective in improving mental status. He currently is on BiPAP and becoming more responsive than on presentation to the ER. Head CT shows no acute intracranial abnormality. He has no fever or leukocytosis. His bicarbonate on renal panel was 31. Chest x-ray shows mild interstitial edema but proBNP is only 275. We have no echo on file. His sister is here who is POA. She states that he has no history of chronic lung disease and has never smoked. He is morbidly obese and she suspects that he has JONATHAN. He has no wheezes on exam    He has been admitted to the ICU. BiPAP has been adjusted to 18/5. On serial ABGs his pH has improved from 7.18 to 7.24. Will repeat ABG at 6 PM tonight. Will give Lasix 40 mill grams IV x1. No steroids indicated. Will give Narcan as needed. He is full code    Pt has a high probability of imminent or life-threatening deterioration requiring close monitoring, and highly complex decision-making and/or interventions of high intensity to assess, manipulate, and support his critical organ systems to prevent a likely inevitable decline which could occur if left untreated.      A total critical care time 35

## 2019-08-09 ENCOUNTER — APPOINTMENT (OUTPATIENT)
Dept: GENERAL RADIOLOGY | Age: 64
DRG: 208 | End: 2019-08-09
Payer: COMMERCIAL

## 2019-08-09 LAB
ANION GAP SERPL CALCULATED.3IONS-SCNC: 12 MMOL/L (ref 3–16)
BASE EXCESS ARTERIAL: 12 (ref -3–3)
BASE EXCESS ARTERIAL: 6.8 MMOL/L (ref -3–3)
BASE EXCESS VENOUS: 10 (ref -3–3)
BASOPHILS ABSOLUTE: 0.1 K/UL (ref 0–0.2)
BASOPHILS RELATIVE PERCENT: 1.1 %
BUN BLDV-MCNC: 19 MG/DL (ref 7–20)
CALCIUM IONIZED: 1.26 MMOL/L (ref 1.12–1.32)
CALCIUM SERPL-MCNC: 10.1 MG/DL (ref 8.3–10.6)
CARBOXYHEMOGLOBIN ARTERIAL: 1.8 % (ref 0–1.5)
CHLORIDE BLD-SCNC: 104 MMOL/L (ref 99–110)
CO2: 30 MMOL/L (ref 21–32)
CREAT SERPL-MCNC: 1.2 MG/DL (ref 0.8–1.3)
EKG ATRIAL RATE: 60 BPM
EKG DIAGNOSIS: NORMAL
EKG P-R INTERVAL: 198 MS
EKG Q-T INTERVAL: 624 MS
EKG QRS DURATION: 230 MS
EKG QTC CALCULATION (BAZETT): 624 MS
EKG R AXIS: -84 DEGREES
EKG T AXIS: -55 DEGREES
EKG VENTRICULAR RATE: 60 BPM
EOSINOPHILS ABSOLUTE: 0 K/UL (ref 0–0.6)
EOSINOPHILS RELATIVE PERCENT: 0 %
GFR AFRICAN AMERICAN: >60
GFR NON-AFRICAN AMERICAN: >60
GLUCOSE BLD-MCNC: 137 MG/DL (ref 70–99)
GLUCOSE BLD-MCNC: 178 MG/DL (ref 70–99)
GLUCOSE BLD-MCNC: 186 MG/DL (ref 70–99)
GLUCOSE BLD-MCNC: 206 MG/DL (ref 70–99)
HCO3 ARTERIAL: 30 MMOL/L (ref 21–29)
HCO3 ARTERIAL: 36.2 MMOL/L (ref 21–29)
HCO3 VENOUS: 32.7 MMOL/L (ref 23–29)
HCT VFR BLD CALC: 39 % (ref 40.5–52.5)
HEMOGLOBIN, ART, EXTENDED: 12.5 G/DL
HEMOGLOBIN: 12.5 G/DL (ref 13.5–17.5)
LACTATE: 2.38 MMOL/L (ref 0.4–2)
LV EF: 33 %
LVEF MODALITY: NORMAL
LYMPHOCYTES ABSOLUTE: 0.6 K/UL (ref 1–5.1)
LYMPHOCYTES RELATIVE PERCENT: 6.6 %
MAGNESIUM: 1.8 MG/DL (ref 1.8–2.4)
MCH RBC QN AUTO: 29.9 PG (ref 26–34)
MCHC RBC AUTO-ENTMCNC: 32 G/DL (ref 31–36)
MCV RBC AUTO: 93.4 FL (ref 80–100)
METHEMOGLOBIN ARTERIAL: 1.1 % (ref 0–1.4)
MONOCYTES ABSOLUTE: 0.2 K/UL (ref 0–1.3)
MONOCYTES RELATIVE PERCENT: 2 %
NEUTROPHILS ABSOLUTE: 7.6 K/UL (ref 1.7–7.7)
NEUTROPHILS RELATIVE PERCENT: 90.3 %
O2 SAT, ARTERIAL: 94 % (ref 93–100)
O2 SAT, ARTERIAL: 97 % (ref 93–100)
O2 SAT, VEN: 60 %
PCO2 ARTERIAL: 35.3 MMHG (ref 35–45)
PCO2 ARTERIAL: 51.1 MM HG (ref 35–45)
PCO2, VEN: 39.9 MM HG (ref 40–50)
PDW BLD-RTO: 15.5 % (ref 12.4–15.4)
PERFORMED ON: ABNORMAL
PH ARTERIAL: 7.46 (ref 7.35–7.45)
PH ARTERIAL: 7.53 (ref 7.35–7.45)
PH VENOUS: 7.52 (ref 7.35–7.45)
PLATELET # BLD: 198 K/UL (ref 135–450)
PMV BLD AUTO: 8.8 FL (ref 5–10.5)
PO2 ARTERIAL: 67.8 MM HG (ref 75–108)
PO2 ARTERIAL: 72.4 MMHG (ref 75–108)
PO2, VEN: 28 MM HG
POC POTASSIUM: 4.9 MMOL/L (ref 3.5–5.1)
POC SAMPLE TYPE: ABNORMAL
POC SAMPLE TYPE: ABNORMAL
POC SODIUM: 144 MMOL/L (ref 136–145)
POTASSIUM REFLEX MAGNESIUM: 4.1 MMOL/L (ref 3.5–5.1)
RBC # BLD: 4.17 M/UL (ref 4.2–5.9)
SODIUM BLD-SCNC: 146 MMOL/L (ref 136–145)
TCO2 ARTERIAL: 31 MMOL/L
TCO2 ARTERIAL: 38 MMOL/L
TCO2 CALC VENOUS: 34 MMOL/L
TSH REFLEX: 1.22 UIU/ML (ref 0.27–4.2)
WBC # BLD: 8.4 K/UL (ref 4–11)

## 2019-08-09 PROCEDURE — 6370000000 HC RX 637 (ALT 250 FOR IP): Performed by: STUDENT IN AN ORGANIZED HEALTH CARE EDUCATION/TRAINING PROGRAM

## 2019-08-09 PROCEDURE — 94750 HC PULMONARY COMPLIANCE STUDY: CPT

## 2019-08-09 PROCEDURE — 94640 AIRWAY INHALATION TREATMENT: CPT

## 2019-08-09 PROCEDURE — C8929 TTE W OR WO FOL WCON,DOPPLER: HCPCS

## 2019-08-09 PROCEDURE — 6360000002 HC RX W HCPCS: Performed by: STUDENT IN AN ORGANIZED HEALTH CARE EDUCATION/TRAINING PROGRAM

## 2019-08-09 PROCEDURE — 94770 HC ETCO2 MONITOR DAILY: CPT

## 2019-08-09 PROCEDURE — 2500000003 HC RX 250 WO HCPCS: Performed by: STUDENT IN AN ORGANIZED HEALTH CARE EDUCATION/TRAINING PROGRAM

## 2019-08-09 PROCEDURE — 84295 ASSAY OF SERUM SODIUM: CPT

## 2019-08-09 PROCEDURE — 82330 ASSAY OF CALCIUM: CPT

## 2019-08-09 PROCEDURE — 6360000002 HC RX W HCPCS: Performed by: INTERNAL MEDICINE

## 2019-08-09 PROCEDURE — 85025 COMPLETE CBC W/AUTO DIFF WBC: CPT

## 2019-08-09 PROCEDURE — 93010 ELECTROCARDIOGRAM REPORT: CPT | Performed by: INTERNAL MEDICINE

## 2019-08-09 PROCEDURE — 83735 ASSAY OF MAGNESIUM: CPT

## 2019-08-09 PROCEDURE — 2580000003 HC RX 258: Performed by: STUDENT IN AN ORGANIZED HEALTH CARE EDUCATION/TRAINING PROGRAM

## 2019-08-09 PROCEDURE — 83605 ASSAY OF LACTIC ACID: CPT

## 2019-08-09 PROCEDURE — 0BH17EZ INSERTION OF ENDOTRACHEAL AIRWAY INTO TRACHEA, VIA NATURAL OR ARTIFICIAL OPENING: ICD-10-PCS | Performed by: STUDENT IN AN ORGANIZED HEALTH CARE EDUCATION/TRAINING PROGRAM

## 2019-08-09 PROCEDURE — 82947 ASSAY GLUCOSE BLOOD QUANT: CPT

## 2019-08-09 PROCEDURE — 99291 CRITICAL CARE FIRST HOUR: CPT | Performed by: INTERNAL MEDICINE

## 2019-08-09 PROCEDURE — 82803 BLOOD GASES ANY COMBINATION: CPT

## 2019-08-09 PROCEDURE — 71045 X-RAY EXAM CHEST 1 VIEW: CPT

## 2019-08-09 PROCEDURE — 74018 RADEX ABDOMEN 1 VIEW: CPT

## 2019-08-09 PROCEDURE — 02HV33Z INSERTION OF INFUSION DEVICE INTO SUPERIOR VENA CAVA, PERCUTANEOUS APPROACH: ICD-10-PCS | Performed by: INTERNAL MEDICINE

## 2019-08-09 PROCEDURE — 2700000000 HC OXYGEN THERAPY PER DAY

## 2019-08-09 PROCEDURE — 93005 ELECTROCARDIOGRAM TRACING: CPT | Performed by: STUDENT IN AN ORGANIZED HEALTH CARE EDUCATION/TRAINING PROGRAM

## 2019-08-09 PROCEDURE — 5A1945Z RESPIRATORY VENTILATION, 24-96 CONSECUTIVE HOURS: ICD-10-PCS | Performed by: STUDENT IN AN ORGANIZED HEALTH CARE EDUCATION/TRAINING PROGRAM

## 2019-08-09 PROCEDURE — 36415 COLL VENOUS BLD VENIPUNCTURE: CPT

## 2019-08-09 PROCEDURE — 2000000000 HC ICU R&B

## 2019-08-09 PROCEDURE — 84132 ASSAY OF SERUM POTASSIUM: CPT

## 2019-08-09 PROCEDURE — 80048 BASIC METABOLIC PNL TOTAL CA: CPT

## 2019-08-09 PROCEDURE — 94003 VENT MGMT INPAT SUBQ DAY: CPT

## 2019-08-09 PROCEDURE — 94761 N-INVAS EAR/PLS OXIMETRY MLT: CPT

## 2019-08-09 PROCEDURE — 36556 INSERT NON-TUNNEL CV CATH: CPT

## 2019-08-09 PROCEDURE — 2580000003 HC RX 258

## 2019-08-09 RX ORDER — SODIUM CHLORIDE 9 MG/ML
INJECTION, SOLUTION INTRAVENOUS
Status: COMPLETED
Start: 2019-08-09 | End: 2019-08-09

## 2019-08-09 RX ORDER — FUROSEMIDE 10 MG/ML
40 INJECTION INTRAMUSCULAR; INTRAVENOUS DAILY
Status: DISCONTINUED | OUTPATIENT
Start: 2019-08-09 | End: 2019-08-10

## 2019-08-09 RX ORDER — SODIUM CHLORIDE 9 MG/ML
INJECTION, SOLUTION INTRAVENOUS
Status: DISCONTINUED
Start: 2019-08-09 | End: 2019-08-09

## 2019-08-09 RX ADMIN — PROPOFOL 50 MCG/KG/MIN: 10 INJECTION, EMULSION INTRAVENOUS at 03:45

## 2019-08-09 RX ADMIN — DEXMEDETOMIDINE 1 MCG/KG/HR: 100 INJECTION, SOLUTION, CONCENTRATE INTRAVENOUS at 01:42

## 2019-08-09 RX ADMIN — IPRATROPIUM BROMIDE AND ALBUTEROL SULFATE 1 AMPULE: .5; 3 SOLUTION RESPIRATORY (INHALATION) at 00:10

## 2019-08-09 RX ADMIN — PROPOFOL 40 MCG/KG/MIN: 10 INJECTION, EMULSION INTRAVENOUS at 06:40

## 2019-08-09 RX ADMIN — DEXMEDETOMIDINE 0.6 MCG/KG/HR: 100 INJECTION, SOLUTION, CONCENTRATE INTRAVENOUS at 23:10

## 2019-08-09 RX ADMIN — PROPOFOL 15 MCG/KG/MIN: 10 INJECTION, EMULSION INTRAVENOUS at 12:31

## 2019-08-09 RX ADMIN — SODIUM CHLORIDE: 0.9 INJECTION, SOLUTION INTRAVENOUS at 12:46

## 2019-08-09 RX ADMIN — Medication 10 ML: at 07:47

## 2019-08-09 RX ADMIN — FUROSEMIDE 40 MG: 10 INJECTION, SOLUTION INTRAMUSCULAR; INTRAVENOUS at 16:21

## 2019-08-09 RX ADMIN — PROPOFOL 25 MCG/KG/MIN: 10 INJECTION, EMULSION INTRAVENOUS at 18:08

## 2019-08-09 RX ADMIN — DEXMEDETOMIDINE 0.5 MCG/KG/HR: 100 INJECTION, SOLUTION, CONCENTRATE INTRAVENOUS at 06:38

## 2019-08-09 RX ADMIN — IPRATROPIUM BROMIDE AND ALBUTEROL SULFATE 1 AMPULE: .5; 3 SOLUTION RESPIRATORY (INHALATION) at 07:58

## 2019-08-09 RX ADMIN — Medication 60 ML: at 20:38

## 2019-08-09 RX ADMIN — DEXMEDETOMIDINE 0.4 MCG/KG/HR: 100 INJECTION, SOLUTION, CONCENTRATE INTRAVENOUS at 14:04

## 2019-08-09 RX ADMIN — ENOXAPARIN SODIUM 40 MG: 40 INJECTION SUBCUTANEOUS at 07:47

## 2019-08-09 RX ADMIN — IPRATROPIUM BROMIDE AND ALBUTEROL SULFATE 1 AMPULE: .5; 3 SOLUTION RESPIRATORY (INHALATION) at 21:09

## 2019-08-09 RX ADMIN — METHYLPREDNISOLONE SODIUM SUCCINATE 40 MG: 40 INJECTION, POWDER, FOR SOLUTION INTRAMUSCULAR; INTRAVENOUS at 07:46

## 2019-08-09 RX ADMIN — IPRATROPIUM BROMIDE AND ALBUTEROL SULFATE 1 AMPULE: .5; 3 SOLUTION RESPIRATORY (INHALATION) at 04:23

## 2019-08-09 RX ADMIN — PROPOFOL 50 MCG/KG/MIN: 10 INJECTION, EMULSION INTRAVENOUS at 01:01

## 2019-08-09 RX ADMIN — IPRATROPIUM BROMIDE AND ALBUTEROL SULFATE 1 AMPULE: .5; 3 SOLUTION RESPIRATORY (INHALATION) at 16:26

## 2019-08-09 RX ADMIN — IPRATROPIUM BROMIDE AND ALBUTEROL SULFATE 1 AMPULE: .5; 3 SOLUTION RESPIRATORY (INHALATION) at 11:33

## 2019-08-09 ASSESSMENT — PULMONARY FUNCTION TESTS
PIF_VALUE: 19
PIF_VALUE: 35
PIF_VALUE: 18
PIF_VALUE: 16
PIF_VALUE: 19
PIF_VALUE: 18
PIF_VALUE: 17
PIF_VALUE: 17
PIF_VALUE: 18
PIF_VALUE: 16
PIF_VALUE: 18
PIF_VALUE: 17
PIF_VALUE: 18
PIF_VALUE: 21

## 2019-08-09 NOTE — PROGRESS NOTES
on chronic hypercapnic respiratory failure  2. Suspected OSH/OHS  3. Morbid obesity  4. Acute encephalopathy  5. Paranoid schizophrenia  6. Hypertension  7. Diabetes  8. CAD  9. HFrEF - 35%     Plan  1. Drop tidal volume to 450. Continue respiratory rate at 14, PEEP at 5, FiO2 40%. 2.  Solu-Medrol 40 mill grams IV daily  3. Precedex with goal RASS -1 to 0  4. DuoNebs every 4 hours  5. Hold psychotropics  6. SAT/SBT daily  7. Lasix 40 mg IV daily  8. Full Code    The patient and / or the family were informed of the results of any tests, a time was given to answer questions, a plan was proposed and they agreed with plan.   Disposition: continue inpatient stay  Full Grisel García MD [id number]

## 2019-08-09 NOTE — CARE COORDINATION
prescription(s)  4. Cost of Rx cannot be added to hospital bill. If financial assistance is needed, please contact unit  or ;  or  CANNOT provide pharmacy voucher for patients co-pays  5. Patients can then  the prescription on their way out of the hospital at discharge, or pharmacy can deliver to the bedside if staff is available. (payment due at time of pick-up or delivery - cash, check, or card accepted)     Able to afford home medications/ co-pay costs: Yes    ADLS  Support Systems: Family Members    PT AM-PAC:   /24  OT AM-PAC:   /24    HOUSING  Home Environment:  SNF      Plans to RETURN to current housing: Yes  Barriers to RETURNING to current housing: None    Home Care Information  Currently ACTIVE with [address]: No  Home Care Agency: Not Applicable        Durable Medical Equipment  DME Provider:   Equipment: None    Home Oxygen and Respiratory Equipment  Has HOME OXYGEN prior to admission: No  [address]: Not Applicable      Dialysis  Active with HD/PD prior to admission: No      DISCHARGE PLAN:  Disposition: Outpatient Therapy    Transportation PLAN for discharge: family     Factors facilitating achievement of predicted outcomes: Family support    Barriers to discharge: currently on vent with sedation    Additional Case Management Notes:      Met with sister at bedside. He has been at [facility] since [facility] and the plan is for him to return at discharge. CM spoke with Carey Pierre at the SNF and gave update - he is welcome to return to facility when medically cleared. Jair Pltat and his family were provided with choice of provider; he and his family are in agreement with the discharge plan.     Care Transition patient: No    Gerber Rand RN  [facility]  Case Management Department  Ph: [phone]

## 2019-08-09 NOTE — PROGRESS NOTES
Eyes:   Pupils constricted but responsive to light   Neck: No JVD present. Large neck   Cardiovascular: Normal rate, regular rhythm, normal heart sounds and intact distal pulses. Exam reveals no gallop and no friction rub. No murmur heard. Pulmonary/Chest: Breath sounds normal. No respiratory distress. He has no wheezes. Abdominal: Soft. Bowel sounds are normal. He exhibits no distension. There is no tenderness. Musculoskeletal: He exhibits edema (1+ pitting in both LE). Lymphadenopathy:     He has no cervical adenopathy. Neurological:   Sedated on propofol 20 and precedex 0.5. Not arousable to sternal rub. Skin: Skin is warm and dry. Capillary refill takes less than 2 seconds. LABS:    CBC:   Recent Labs     08/08/19  1014 08/09/19  0347   WBC 6.5 8.4   HGB 11.5* 12.5*   HCT 37.5* 39.0*    198   MCV 94.9 93.4     Renal:    Recent Labs     08/08/19  1014 08/08/19  1902 08/09/19  0347    144 146*   K 5.4* 5.8* 4.1    106 104   CO2 31 32 30   BUN 19 19 19   CREATININE 1.2 1.3 1.2   GLUCOSE 128* 124* 206*   CALCIUM 9.3 9.7 10.1   MG  --   --  1.80   ANIONGAP 7 6 12     Hepatic:   Recent Labs     08/08/19  1014   AST 23   ALT 53*   BILITOT <0.2   BILIDIR <0.2   PROT 7.5   LABALBU 3.7   ALKPHOS 84     Troponin:   Recent Labs     08/08/19  1014   TROPONINI <0.01     BNP: No results for input(s): BNP in the last 72 hours. Lipids: No results for input(s): CHOL, HDL in the last 72 hours. Invalid input(s): LDLCALCU, TRIGLYCERIDE  ABGs:    Recent Labs     08/08/19  1928 08/08/19  2217 08/09/19  0636   PHART 7.231* 7.219* 7.532*   GHD9SLH 91.7* 96.7* 35.3   PO2ART 170.1* 99.3 72.4*   SLS9NIG 38.5* 39.5* 30*   BEART 11* 12* 6.8*   I9TTSKZL 99 95 97   CXO9BRA 41 43 31       INR: No results for input(s): INR in the last 72 hours.   Lactate:   Recent Labs     08/08/19  1928 08/08/19  2217 08/09/19  0114   LACTATE 0.39* 0.44 2.38*

## 2019-08-09 NOTE — PROGRESS NOTES
RESPIRATORY THERAPY ASSESSMENT    Name:  Todd Nick  Medical Record Number:  [id number]  Age: 71 y.o. Gender: male  : 1955  Today's Date:  2019  Room:  80/9171-51    Assessment     Is the patient being admitted for a COPD or Asthma exacerbation? No   (If yes the patient will be seen every 4 hours for the first 24 hours and then reassessed)    Patient Admission Diagnosis      Allergies  Allergies   Allergen Reactions    Cephalosporins     Lorazepam     Pcn [Penicillins]                Pulmonary History:No history  Home Oxygen Therapy:  room air  Home Respiratory Therapy:None   Current Respiratory Therapy:  duoneb q4h  Treatment Type: Aerosol generator(aerogen)  Medications: Albuterol/Ipratropium    Respiratory Severity Index(RSI)   Patients with orders for inhalation medications, oxygen, or any therapeutic treatment modality will be placed on Respiratory Protocol. They will be assessed with the first treatment and at least every 72 hours thereafter. The following severity scale will be used to determine frequency of treatment intervention.     Smoking History: No Smoking History = 0    Social History  Social History     Tobacco Use    Smoking status: Never Smoker    Smokeless tobacco: Never Used   Substance Use Topics    Alcohol use: No    Drug use: No       Recent Surgical History: None = 0  Past Surgical History  Past Surgical History:   Procedure Laterality Date    PACEMAKER PLACEMENT         Level of Consciousness: Lethargic = 3    Level of Activity: Non weight bearing- transfers bed to chair only = 3    Respiratory Pattern: Regular Pattern; RR 8-20 = 0    Breath Sounds: Diminshed bilaterally and/or crackles = 2    Sputum   ,  ,    Cough: Strong, spontaneous, non-productive = 0    Vital Signs   BP (!) 156/89   Pulse 60   Temp 97.7 °F (36.5 °C) (Axillary)   Resp 15   Ht 5' 10\" (1.778 m)   Wt 248 lb 3.8 oz (112.6 kg)   SpO2 100%   BMI 35.62 kg/m²   SPO2 (COPD values may

## 2019-08-10 LAB
ANION GAP SERPL CALCULATED.3IONS-SCNC: 11 MMOL/L (ref 3–16)
BASE EXCESS ARTERIAL: 10 (ref -3–3)
BASOPHILS ABSOLUTE: 0.1 K/UL (ref 0–0.2)
BASOPHILS RELATIVE PERCENT: 0.5 %
BUN BLDV-MCNC: 28 MG/DL (ref 7–20)
CALCIUM SERPL-MCNC: 10.2 MG/DL (ref 8.3–10.6)
CHLORIDE BLD-SCNC: 104 MMOL/L (ref 99–110)
CO2: 33 MMOL/L (ref 21–32)
CREAT SERPL-MCNC: 1.3 MG/DL (ref 0.8–1.3)
EOSINOPHILS ABSOLUTE: 0 K/UL (ref 0–0.6)
EOSINOPHILS RELATIVE PERCENT: 0.2 %
GFR AFRICAN AMERICAN: >60
GFR NON-AFRICAN AMERICAN: 55
GLUCOSE BLD-MCNC: 153 MG/DL (ref 70–99)
HCO3 ARTERIAL: 33.3 MMOL/L (ref 21–29)
HCT VFR BLD CALC: 40.8 % (ref 40.5–52.5)
HEMOGLOBIN: 12.8 G/DL (ref 13.5–17.5)
LYMPHOCYTES ABSOLUTE: 1.4 K/UL (ref 1–5.1)
LYMPHOCYTES RELATIVE PERCENT: 11.1 %
MAGNESIUM: 2 MG/DL (ref 1.8–2.4)
MCH RBC QN AUTO: 29 PG (ref 26–34)
MCHC RBC AUTO-ENTMCNC: 31.5 G/DL (ref 31–36)
MCV RBC AUTO: 92.3 FL (ref 80–100)
MONOCYTES ABSOLUTE: 1.1 K/UL (ref 0–1.3)
MONOCYTES RELATIVE PERCENT: 8.4 %
NEUTROPHILS ABSOLUTE: 10.1 K/UL (ref 1.7–7.7)
NEUTROPHILS RELATIVE PERCENT: 79.8 %
O2 SAT, ARTERIAL: 94 % (ref 93–100)
PCO2 ARTERIAL: 45.6 MM HG (ref 35–45)
PDW BLD-RTO: 16.4 % (ref 12.4–15.4)
PERFORMED ON: ABNORMAL
PH ARTERIAL: 7.47 (ref 7.35–7.45)
PLATELET # BLD: 224 K/UL (ref 135–450)
PMV BLD AUTO: 9.3 FL (ref 5–10.5)
PO2 ARTERIAL: 65.8 MM HG (ref 75–108)
POC SAMPLE TYPE: ABNORMAL
POTASSIUM REFLEX MAGNESIUM: 3.9 MMOL/L (ref 3.5–5.1)
PRO-BNP: 714 PG/ML (ref 0–124)
RBC # BLD: 4.42 M/UL (ref 4.2–5.9)
SODIUM BLD-SCNC: 148 MMOL/L (ref 136–145)
TCO2 ARTERIAL: 35 MMOL/L
WBC # BLD: 12.7 K/UL (ref 4–11)

## 2019-08-10 PROCEDURE — 83880 ASSAY OF NATRIURETIC PEPTIDE: CPT

## 2019-08-10 PROCEDURE — 2700000000 HC OXYGEN THERAPY PER DAY

## 2019-08-10 PROCEDURE — 6360000002 HC RX W HCPCS: Performed by: STUDENT IN AN ORGANIZED HEALTH CARE EDUCATION/TRAINING PROGRAM

## 2019-08-10 PROCEDURE — 2580000003 HC RX 258: Performed by: STUDENT IN AN ORGANIZED HEALTH CARE EDUCATION/TRAINING PROGRAM

## 2019-08-10 PROCEDURE — 85025 COMPLETE CBC W/AUTO DIFF WBC: CPT

## 2019-08-10 PROCEDURE — 6370000000 HC RX 637 (ALT 250 FOR IP): Performed by: STUDENT IN AN ORGANIZED HEALTH CARE EDUCATION/TRAINING PROGRAM

## 2019-08-10 PROCEDURE — 80048 BASIC METABOLIC PNL TOTAL CA: CPT

## 2019-08-10 PROCEDURE — 92610 EVALUATE SWALLOWING FUNCTION: CPT

## 2019-08-10 PROCEDURE — 94003 VENT MGMT INPAT SUBQ DAY: CPT

## 2019-08-10 PROCEDURE — 83735 ASSAY OF MAGNESIUM: CPT

## 2019-08-10 PROCEDURE — 6360000002 HC RX W HCPCS

## 2019-08-10 PROCEDURE — 99233 SBSQ HOSP IP/OBS HIGH 50: CPT | Performed by: INTERNAL MEDICINE

## 2019-08-10 PROCEDURE — 94761 N-INVAS EAR/PLS OXIMETRY MLT: CPT

## 2019-08-10 PROCEDURE — 94640 AIRWAY INHALATION TREATMENT: CPT

## 2019-08-10 PROCEDURE — 94150 VITAL CAPACITY TEST: CPT

## 2019-08-10 PROCEDURE — 92526 ORAL FUNCTION THERAPY: CPT

## 2019-08-10 PROCEDURE — 36592 COLLECT BLOOD FROM PICC: CPT

## 2019-08-10 PROCEDURE — 2000000000 HC ICU R&B

## 2019-08-10 PROCEDURE — 94770 HC ETCO2 MONITOR DAILY: CPT

## 2019-08-10 PROCEDURE — 2500000003 HC RX 250 WO HCPCS: Performed by: STUDENT IN AN ORGANIZED HEALTH CARE EDUCATION/TRAINING PROGRAM

## 2019-08-10 PROCEDURE — 82803 BLOOD GASES ANY COMBINATION: CPT

## 2019-08-10 RX ORDER — ACETAMINOPHEN 325 MG/1
650 TABLET ORAL EVERY 6 HOURS PRN
Status: DISCONTINUED | OUTPATIENT
Start: 2019-08-10 | End: 2019-08-13 | Stop reason: HOSPADM

## 2019-08-10 RX ORDER — HYDRALAZINE HYDROCHLORIDE 20 MG/ML
INJECTION INTRAMUSCULAR; INTRAVENOUS
Status: DISCONTINUED
Start: 2019-08-10 | End: 2019-08-10

## 2019-08-10 RX ORDER — FUROSEMIDE 40 MG/1
40 TABLET ORAL DAILY
Status: DISCONTINUED | OUTPATIENT
Start: 2019-08-10 | End: 2019-08-13

## 2019-08-10 RX ORDER — OLANZAPINE 5 MG/1
20 TABLET ORAL NIGHTLY
Status: DISCONTINUED | OUTPATIENT
Start: 2019-08-10 | End: 2019-08-13 | Stop reason: HOSPADM

## 2019-08-10 RX ORDER — CARVEDILOL 25 MG/1
25 TABLET ORAL 2 TIMES DAILY WITH MEALS
Status: DISCONTINUED | OUTPATIENT
Start: 2019-08-10 | End: 2019-08-10

## 2019-08-10 RX ORDER — HYDRALAZINE HYDROCHLORIDE 20 MG/ML
10 INJECTION INTRAMUSCULAR; INTRAVENOUS EVERY 4 HOURS PRN
Status: DISCONTINUED | OUTPATIENT
Start: 2019-08-10 | End: 2019-08-13 | Stop reason: HOSPADM

## 2019-08-10 RX ORDER — LISINOPRIL 10 MG/1
10 TABLET ORAL DAILY
Status: DISCONTINUED | OUTPATIENT
Start: 2019-08-10 | End: 2019-08-13 | Stop reason: HOSPADM

## 2019-08-10 RX ORDER — CARVEDILOL 25 MG/1
25 TABLET ORAL 2 TIMES DAILY WITH MEALS
Status: DISCONTINUED | OUTPATIENT
Start: 2019-08-10 | End: 2019-08-13 | Stop reason: HOSPADM

## 2019-08-10 RX ORDER — HYDRALAZINE HYDROCHLORIDE 20 MG/ML
10 INJECTION INTRAMUSCULAR; INTRAVENOUS EVERY 6 HOURS PRN
Status: DISCONTINUED | OUTPATIENT
Start: 2019-08-10 | End: 2019-08-10

## 2019-08-10 RX ADMIN — HYDRALAZINE HYDROCHLORIDE 10 MG: 20 INJECTION INTRAMUSCULAR; INTRAVENOUS at 05:24

## 2019-08-10 RX ADMIN — DEXMEDETOMIDINE 1.4 MCG/KG/HR: 100 INJECTION, SOLUTION, CONCENTRATE INTRAVENOUS at 04:27

## 2019-08-10 RX ADMIN — LISINOPRIL 10 MG: 10 TABLET ORAL at 14:36

## 2019-08-10 RX ADMIN — IPRATROPIUM BROMIDE AND ALBUTEROL SULFATE 1 AMPULE: .5; 3 SOLUTION RESPIRATORY (INHALATION) at 13:05

## 2019-08-10 RX ADMIN — Medication 10 ML: at 21:44

## 2019-08-10 RX ADMIN — DEXMEDETOMIDINE 1 MCG/KG/HR: 100 INJECTION, SOLUTION, CONCENTRATE INTRAVENOUS at 11:13

## 2019-08-10 RX ADMIN — IPRATROPIUM BROMIDE AND ALBUTEROL SULFATE 1 AMPULE: .5; 3 SOLUTION RESPIRATORY (INHALATION) at 09:39

## 2019-08-10 RX ADMIN — DEXMEDETOMIDINE 1.4 MCG/KG/HR: 100 INJECTION, SOLUTION, CONCENTRATE INTRAVENOUS at 02:50

## 2019-08-10 RX ADMIN — FUROSEMIDE 40 MG: 40 TABLET ORAL at 14:57

## 2019-08-10 RX ADMIN — CARVEDILOL 25 MG: 25 TABLET, FILM COATED ORAL at 17:16

## 2019-08-10 RX ADMIN — IPRATROPIUM BROMIDE AND ALBUTEROL SULFATE 1 AMPULE: .5; 3 SOLUTION RESPIRATORY (INHALATION) at 16:39

## 2019-08-10 RX ADMIN — IPRATROPIUM BROMIDE AND ALBUTEROL SULFATE 1 AMPULE: .5; 3 SOLUTION RESPIRATORY (INHALATION) at 03:57

## 2019-08-10 RX ADMIN — METHYLPREDNISOLONE SODIUM SUCCINATE 40 MG: 40 INJECTION, POWDER, FOR SOLUTION INTRAMUSCULAR; INTRAVENOUS at 09:01

## 2019-08-10 RX ADMIN — ENOXAPARIN SODIUM 40 MG: 40 INJECTION SUBCUTANEOUS at 09:01

## 2019-08-10 RX ADMIN — IPRATROPIUM BROMIDE AND ALBUTEROL SULFATE 1 AMPULE: .5; 3 SOLUTION RESPIRATORY (INHALATION) at 21:24

## 2019-08-10 RX ADMIN — IPRATROPIUM BROMIDE AND ALBUTEROL SULFATE 1 AMPULE: .5; 3 SOLUTION RESPIRATORY (INHALATION) at 00:05

## 2019-08-10 RX ADMIN — HYDRALAZINE HYDROCHLORIDE 10 MG: 20 INJECTION INTRAMUSCULAR; INTRAVENOUS at 01:15

## 2019-08-10 RX ADMIN — OLANZAPINE 20 MG: 5 TABLET, FILM COATED ORAL at 21:44

## 2019-08-10 RX ADMIN — DEXMEDETOMIDINE 1.4 MCG/KG/HR: 100 INJECTION, SOLUTION, CONCENTRATE INTRAVENOUS at 07:50

## 2019-08-10 ASSESSMENT — PAIN SCALES - GENERAL
PAINLEVEL_OUTOF10: 0

## 2019-08-10 ASSESSMENT — PAIN SCALES - WONG BAKER
WONGBAKER_NUMERICALRESPONSE: 0

## 2019-08-10 ASSESSMENT — PULMONARY FUNCTION TESTS: PIF_VALUE: 19

## 2019-08-10 NOTE — CONSULTS
daily Apply topically 2 times daily.  carvedilol (COREG) 25 MG tablet Take 25 mg by mouth 2 times daily (with meals).  chlorproMAZINE (THORAZINE) 50 MG tablet Take 150 mg by mouth 2 times daily Patient takes 3 tablets in the afternoon and 3 tablets at bedtime      lisinopril (PRINIVIL;ZESTRIL) 10 MG tablet Take 10 mg by mouth daily       oxcarbazepine (TRILEPTAL) 300 MG tablet Take 300 mg by mouth 2 times daily.  OLANZapine (ZYPREXA) 20 MG tablet Take 20 mg by mouth nightly          Allergies:  Cephalosporins; Lorazepam; and Pcn [penicillins]    Social History:   TOBACCO:   reports that he has never smoked. He has never used smokeless tobacco.       ETOH:   reports that he does not drink alcohol. Patient currently lives in an assisted living environment    Family History:       Problem Relation Age of Onset    No Known Problems Mother     No Known Problems Father        Vital/I&O/Physical examination:   VS:  BP (!) 158/96   Pulse 68   Temp 97.9 °F (36.6 °C) (Axillary)   Resp 17   Ht 5' 10\" (1.778 m)   Wt 246 lb (111.6 kg)   SpO2 94%   BMI 35.30 kg/m²     I/O:    Intake/Output Summary (Last 24 hours) at 8/10/2019 1455  Last data filed at 8/10/2019 1400  Gross per 24 hour   Intake 824 ml   Output 3400 ml   Net -2576 ml       PE:  Physical Exam   Constitutional: He appears well-nourished. No distress. HENT:   Head: Atraumatic. Right Ear: External ear normal.   Left Ear: External ear normal.   Eyes: Right eye exhibits no discharge. Left eye exhibits no discharge. No scleral icterus. Constricted B/L pupil but reactive to light. Neck: Neck supple. No tracheal deviation present. Increased neck circumference   Cardiovascular: Normal rate and regular rhythm. Exam reveals no friction rub. No murmur heard. Pulmonary/Chest: Effort normal and breath sounds normal. No stridor. No respiratory distress. On BiPAP. Abdominal: Soft. Bowel sounds are normal. He exhibits no distension. self-extubated 8/10, currently on 4L O2  - ABG post-extubation improved  - continue BiPAP nightly   - keep HOB >30deg  - Narcan as needed  - titrate SpO2 >92%  - passed SLP, now on dental soft diet  - avoid benzodiazepine  - watch for signs of aspiration pneumonia    Nephrology  Hx of CKD 3   - CR at baseline of 1.2  - daily BMP  - hypernatremic today from hypodipsia    Endocrinology  DM2  A1C 6.2 (3/21/19). Diet controled. - POCT glu qAC and HS  - carb contol diet     Code Status: Full Code  FEN: IVF: none; DIET DENTAL SOFT;  PPX: Heparin   DISPO: ICU    This patient will be discussed with attending, Dr Pastor Snehal VILLANUEVA.    Diaz Serrano MD, PGY- 2  Contact via [employer]  8/10/2019,  2:55 PM     Pulm/CC     Patient seen and examined. I agree with Dr. Sanabria Nearing history, physical, lab findings, assessment and plan.     Assessment  1. Acute on chronic hypercapnic respiratory failure  2. Suspected OSH/OHS  3. Morbid obesity  4. Acute encephalopathy  5. Paranoid schizophrenia  6. Hypertension  7. Diabetes  8. CAD  9. HFrEF - 35%     Plan  1. Wean O2 for goal O2 sat of 88%. 2.  Solu-Medrol 40 mill grams IV daily  3. Observe in ICU. 4.  DuoNebs every 4 hours  5. Retsart Zyprexa  6. Swallow eval  7. Lasix 40 mg PO daily  8.  Full Code    Domingo Zavala MD

## 2019-08-10 NOTE — PROGRESS NOTES
present)      Therapy Time  SLP Individual Minutes  Time In: 1400  Time Out: 4529  Minutes: 25  SLP Co-Treatment Minutes  Time In: 0000  Time Out: 0000  Minutes: 0  SLP Total Treatment Time  Timed Code Treatment Minutes: 0 Minutes  Total Treatment Time: 25    Janie Gong M.A., CCC-SLP [id number]  Speech-Language Pathologist

## 2019-08-11 LAB
AMPHETAMINES SCREEN BLOOD: NEGATIVE NG/ML
ANION GAP SERPL CALCULATED.3IONS-SCNC: 12 MMOL/L (ref 3–16)
BARBITURATES SCREEN BLOOD: NEGATIVE NG/ML
BASOPHILS ABSOLUTE: 0.1 K/UL (ref 0–0.2)
BASOPHILS RELATIVE PERCENT: 0.6 %
BENZODIAZEPINES SCREEN BLOOD: NEGATIVE NG/ML
BUN BLDV-MCNC: 29 MG/DL (ref 7–20)
BUPRENORPHINE: NEGATIVE NG/ML
CALCIUM SERPL-MCNC: 9.5 MG/DL (ref 8.3–10.6)
CANNABINOID SCREEN BLOOD: NEGATIVE NG/ML
CHLORIDE BLD-SCNC: 102 MMOL/L (ref 99–110)
CO2: 29 MMOL/L (ref 21–32)
COCAINE SCREEN BLOOD: NEGATIVE NG/ML
CREAT SERPL-MCNC: 1.3 MG/DL (ref 0.8–1.3)
EOSINOPHILS ABSOLUTE: 0.1 K/UL (ref 0–0.6)
EOSINOPHILS RELATIVE PERCENT: 1 %
GFR AFRICAN AMERICAN: >60
GFR NON-AFRICAN AMERICAN: 55
GLUCOSE BLD-MCNC: 104 MG/DL (ref 70–99)
HCT VFR BLD CALC: 40.2 % (ref 40.5–52.5)
HEMOGLOBIN: 12.6 G/DL (ref 13.5–17.5)
LYMPHOCYTES ABSOLUTE: 2.2 K/UL (ref 1–5.1)
LYMPHOCYTES RELATIVE PERCENT: 18.5 %
Lab: NORMAL
MAGNESIUM: 2 MG/DL (ref 1.8–2.4)
MCH RBC QN AUTO: 29 PG (ref 26–34)
MCHC RBC AUTO-ENTMCNC: 31.3 G/DL (ref 31–36)
MCV RBC AUTO: 92.5 FL (ref 80–100)
METHADONE SCREEN BLOOD: NEGATIVE NG/ML
METHAMPHETAMINES SERUM/ PLASMA: NEGATIVE NG/ML
MONOCYTES ABSOLUTE: 1.1 K/UL (ref 0–1.3)
MONOCYTES RELATIVE PERCENT: 9.2 %
NEUTROPHILS ABSOLUTE: 8.3 K/UL (ref 1.7–7.7)
NEUTROPHILS RELATIVE PERCENT: 70.7 %
OPIATES SCREEN BLOOD: NEGATIVE NG/ML
OXYCODONE: NEGATIVE NG/ML
PDW BLD-RTO: 16.1 % (ref 12.4–15.4)
PHENCYCLIDINE SCREEN BLOOD: NEGATIVE NG/ML
PLATELET # BLD: 208 K/UL (ref 135–450)
PMV BLD AUTO: 9.3 FL (ref 5–10.5)
POTASSIUM REFLEX MAGNESIUM: 3.5 MMOL/L (ref 3.5–5.1)
RBC # BLD: 4.35 M/UL (ref 4.2–5.9)
SODIUM BLD-SCNC: 143 MMOL/L (ref 136–145)
WBC # BLD: 11.7 K/UL (ref 4–11)

## 2019-08-11 PROCEDURE — 6360000002 HC RX W HCPCS: Performed by: STUDENT IN AN ORGANIZED HEALTH CARE EDUCATION/TRAINING PROGRAM

## 2019-08-11 PROCEDURE — 94761 N-INVAS EAR/PLS OXIMETRY MLT: CPT

## 2019-08-11 PROCEDURE — 6370000000 HC RX 637 (ALT 250 FOR IP): Performed by: STUDENT IN AN ORGANIZED HEALTH CARE EDUCATION/TRAINING PROGRAM

## 2019-08-11 PROCEDURE — 85025 COMPLETE CBC W/AUTO DIFF WBC: CPT

## 2019-08-11 PROCEDURE — 94640 AIRWAY INHALATION TREATMENT: CPT

## 2019-08-11 PROCEDURE — 2580000003 HC RX 258: Performed by: STUDENT IN AN ORGANIZED HEALTH CARE EDUCATION/TRAINING PROGRAM

## 2019-08-11 PROCEDURE — 36415 COLL VENOUS BLD VENIPUNCTURE: CPT

## 2019-08-11 PROCEDURE — 1200000000 HC SEMI PRIVATE

## 2019-08-11 PROCEDURE — 6370000000 HC RX 637 (ALT 250 FOR IP): Performed by: INTERNAL MEDICINE

## 2019-08-11 PROCEDURE — 2700000000 HC OXYGEN THERAPY PER DAY

## 2019-08-11 PROCEDURE — 83735 ASSAY OF MAGNESIUM: CPT

## 2019-08-11 PROCEDURE — 80048 BASIC METABOLIC PNL TOTAL CA: CPT

## 2019-08-11 PROCEDURE — 99233 SBSQ HOSP IP/OBS HIGH 50: CPT | Performed by: INTERNAL MEDICINE

## 2019-08-11 RX ORDER — PREDNISONE 20 MG/1
20 TABLET ORAL DAILY
Status: COMPLETED | OUTPATIENT
Start: 2019-08-12 | End: 2019-08-13

## 2019-08-11 RX ORDER — IPRATROPIUM BROMIDE AND ALBUTEROL SULFATE 2.5; .5 MG/3ML; MG/3ML
1 SOLUTION RESPIRATORY (INHALATION) 2 TIMES DAILY
Status: DISCONTINUED | OUTPATIENT
Start: 2019-08-11 | End: 2019-08-13 | Stop reason: HOSPADM

## 2019-08-11 RX ORDER — IPRATROPIUM BROMIDE AND ALBUTEROL SULFATE 2.5; .5 MG/3ML; MG/3ML
1 SOLUTION RESPIRATORY (INHALATION) EVERY 4 HOURS PRN
Status: DISCONTINUED | OUTPATIENT
Start: 2019-08-11 | End: 2019-08-13 | Stop reason: HOSPADM

## 2019-08-11 RX ADMIN — IPRATROPIUM BROMIDE AND ALBUTEROL SULFATE 1 AMPULE: .5; 3 SOLUTION RESPIRATORY (INHALATION) at 20:23

## 2019-08-11 RX ADMIN — Medication 10 ML: at 08:12

## 2019-08-11 RX ADMIN — IPRATROPIUM BROMIDE AND ALBUTEROL SULFATE 1 AMPULE: .5; 3 SOLUTION RESPIRATORY (INHALATION) at 10:18

## 2019-08-11 RX ADMIN — LISINOPRIL 10 MG: 10 TABLET ORAL at 08:12

## 2019-08-11 RX ADMIN — Medication 10 ML: at 22:13

## 2019-08-11 RX ADMIN — ENOXAPARIN SODIUM 40 MG: 40 INJECTION SUBCUTANEOUS at 08:12

## 2019-08-11 RX ADMIN — CARVEDILOL 25 MG: 25 TABLET, FILM COATED ORAL at 17:24

## 2019-08-11 RX ADMIN — OLANZAPINE 20 MG: 5 TABLET, FILM COATED ORAL at 22:11

## 2019-08-11 RX ADMIN — FUROSEMIDE 40 MG: 40 TABLET ORAL at 08:12

## 2019-08-11 RX ADMIN — CARVEDILOL 25 MG: 25 TABLET, FILM COATED ORAL at 08:12

## 2019-08-11 RX ADMIN — METHYLPREDNISOLONE SODIUM SUCCINATE 40 MG: 40 INJECTION, POWDER, FOR SOLUTION INTRAMUSCULAR; INTRAVENOUS at 08:12

## 2019-08-11 ASSESSMENT — PAIN SCALES - WONG BAKER
WONGBAKER_NUMERICALRESPONSE: 0

## 2019-08-11 ASSESSMENT — PAIN SCALES - GENERAL
PAINLEVEL_OUTOF10: 0

## 2019-08-11 NOTE — PROGRESS NOTES
Pt arrived to room 6318 A&O, sister at bedside. VSS, denies pain at this time. Pt placed on telemetry monitor and oriented to room and call light.

## 2019-08-11 NOTE — PROGRESS NOTES
RESPIRATORY THERAPY ASSESSMENT    Name:  Jefry Nix  Medical Record Number:  [id number]  Age: 71 y.o. Gender: male  : 1955  Today's Date:  2019  Room:  6159/1752-24    Assessment     Is the patient being admitted for a COPD or Asthma exacerbation? No   (If yes the patient will be seen every 4 hours for the first 24 hours and then reassessed)    Patient Admission Diagnosis      Allergies  Allergies   Allergen Reactions    Cephalosporins     Lorazepam     Pcn [Penicillins]              Pulmonary History:No history  Home Oxygen Therapy:  room air  Home Respiratory Therapy:None   Current Respiratory Therapy:  Q4 Duoneb  Treatment Type: HHN  Medications: Albuterol/Ipratropium    Respiratory Severity Index(RSI)   Patients with orders for inhalation medications, oxygen, or any therapeutic treatment modality will be placed on Respiratory Protocol. They will be assessed with the first treatment and at least every 72 hours thereafter. The following severity scale will be used to determine frequency of treatment intervention. Smoking History: No Smoking History = 0    Social History  Social History     Tobacco Use    Smoking status: Never Smoker    Smokeless tobacco: Never Used   Substance Use Topics    Alcohol use: No    Drug use: No       Recent Surgical History: None = 0  Past Surgical History  Past Surgical History:   Procedure Laterality Date    PACEMAKER PLACEMENT         Level of Consciousness: Alert, Follows Commands but Disoriented = 1    Level of Activity: Non weight bearing- transfers bed to chair only = 3    Respiratory Pattern: Regular Pattern; RR 8-20 = 0    Breath Sounds: Diminshed bilaterally and/or crackles = 2    Sputum  Sputum Color: Yellow, Tan, Tenacity:  Thick, Sputum How Obtained: Oral, Endotracheal, Suctioned  Cough: Strong, spontaneous, non-productive = 0    Vital Signs   /73   Pulse 70   Temp 97.7 °F (36.5 °C) (Oral)   Resp 15   Ht 5' 10\" (1.778 m)   Wt 246 lb (111.6 kg)   SpO2 96%   BMI 35.30 kg/m²   SPO2 (COPD values may differ): 88-89% on room air or greater than 92% on FiO2 28- 35% = 2    Peak Flow (asthma only): not applicable = 0    RSI: 7-8 = BID and Q4HPRN (every four hours as needed) for dyspnea        Plan       Goals: medication delivery    Patient/caregiver was educated on the proper method of use for Respiratory Care Devices:  Yes      Level of patient/caregiver understanding able to:   ? Verbalize understanding   ? Demonstrate understanding       ? Teach back        ? Needs reinforcement       ? No available caregiver               ? Other:     Response to education:  Good     Is patient being placed on Home Treatment Regimen? No     Does the patient have everything they need prior to discharge? NA     Comments: pt unable to perform IS due to limited understanding of technique    Plan of Care: BID and Q 4 PRN Duoneb    Electronically signed by Joaquín Linton RCP on 8/11/2019 at 7:18 AM    Respiratory Protocol Guidelines     1. Assessment and treatment by Respiratory Therapy will be initiated for medication and therapeutic interventions upon initiation of aerosolized medication. 2. Physician will be contacted for respiratory rate (RR) greater than 35 breaths per minute. Therapy will be held for heart rate (HR) greater than 140 beats per minute, pending direction from physician. 3. Bronchodilators will be administered via Metered Dose Inhaler (MDI) with spacer when the following criteria are met:  a. Alert and cooperative     b. HR < 140 bpm  c. RR < 30 bpm                d. Can demonstrate a 2-3 second inspiratory hold  4. Bronchodilators will be administered via Hand Held Nebulizer BALJINDER [facility]) to patients when ANY of the following criteria are met  a. Incognizant or uncooperative          b. Patients treated with HHN at Home        c. Unable to demonstrate proper use of MDI with spacer     d. RR > 30 bpm   5.  Bronchodilators will be delivered via

## 2019-08-11 NOTE — PROGRESS NOTES
by mouth every 6 hours as needed for Pain      trolamine salicylate (ASPERCREME) 10 % cream Apply topically as needed for Pain Apply topically to left shoulder as needed.  ARTIFICIAL TEAR OP Apply to eye      Omega 3 1000 MG CAPS Take by mouth      hydrocortisone 1 % cream Apply topically 2 times daily Apply topically 2 times daily.  carvedilol (COREG) 25 MG tablet Take 25 mg by mouth 2 times daily (with meals).  chlorproMAZINE (THORAZINE) 50 MG tablet Take 150 mg by mouth 2 times daily Patient takes 3 tablets in the afternoon and 3 tablets at bedtime      lisinopril (PRINIVIL;ZESTRIL) 10 MG tablet Take 10 mg by mouth daily       oxcarbazepine (TRILEPTAL) 300 MG tablet Take 300 mg by mouth 2 times daily.  OLANZapine (ZYPREXA) 20 MG tablet Take 20 mg by mouth nightly          Allergies:  Cephalosporins; Lorazepam; and Pcn [penicillins]    Social History:   TOBACCO:   reports that he has never smoked. He has never used smokeless tobacco.       ETOH:   reports that he does not drink alcohol. Patient currently lives in an assisted living environment    Family History:       Problem Relation Age of Onset    No Known Problems Mother     No Known Problems Father        Vital/I&O/Physical examination:   VS:  BP (!) 146/87   Pulse 75   Temp 98.3 °F (36.8 °C) (Oral)   Resp 23   Ht 5' 10\" (1.778 m)   Wt 246 lb (111.6 kg)   SpO2 96%   BMI 35.30 kg/m²     I/O:    Intake/Output Summary (Last 24 hours) at 8/11/2019 1016  Last data filed at 8/11/2019 0811  Gross per 24 hour   Intake 1914 ml   Output 1885 ml   Net 29 ml       PE:  Physical Exam   Constitutional: He appears well-nourished. No distress. HENT:   Head: Atraumatic. Right Ear: External ear normal.   Left Ear: External ear normal.   Eyes: Pupils are equal, round, and reactive to light. EOM are normal. Right eye exhibits no discharge. Left eye exhibits no discharge. No scleral icterus. Neck: Neck supple.  No tracheal deviation

## 2019-08-12 LAB
ANION GAP SERPL CALCULATED.3IONS-SCNC: 10 MMOL/L (ref 3–16)
BASOPHILS ABSOLUTE: 0.1 K/UL (ref 0–0.2)
BASOPHILS RELATIVE PERCENT: 0.8 %
BUN BLDV-MCNC: 30 MG/DL (ref 7–20)
CALCIUM SERPL-MCNC: 9.8 MG/DL (ref 8.3–10.6)
CHLORIDE BLD-SCNC: 102 MMOL/L (ref 99–110)
CO2: 30 MMOL/L (ref 21–32)
CREAT SERPL-MCNC: 1.2 MG/DL (ref 0.8–1.3)
EOSINOPHILS ABSOLUTE: 0.2 K/UL (ref 0–0.6)
EOSINOPHILS RELATIVE PERCENT: 1.3 %
GFR AFRICAN AMERICAN: >60
GFR NON-AFRICAN AMERICAN: >60
GLUCOSE BLD-MCNC: 128 MG/DL (ref 70–99)
HCT VFR BLD CALC: 42.1 % (ref 40.5–52.5)
HEMOGLOBIN: 13.2 G/DL (ref 13.5–17.5)
LYMPHOCYTES ABSOLUTE: 3 K/UL (ref 1–5.1)
LYMPHOCYTES RELATIVE PERCENT: 25.5 %
MAGNESIUM: 2.1 MG/DL (ref 1.8–2.4)
MCH RBC QN AUTO: 29.1 PG (ref 26–34)
MCHC RBC AUTO-ENTMCNC: 31.4 G/DL (ref 31–36)
MCV RBC AUTO: 92.5 FL (ref 80–100)
MONOCYTES ABSOLUTE: 1.2 K/UL (ref 0–1.3)
MONOCYTES RELATIVE PERCENT: 9.8 %
NEUTROPHILS ABSOLUTE: 7.5 K/UL (ref 1.7–7.7)
NEUTROPHILS RELATIVE PERCENT: 62.6 %
PDW BLD-RTO: 16.3 % (ref 12.4–15.4)
PLATELET # BLD: 221 K/UL (ref 135–450)
PMV BLD AUTO: 8.8 FL (ref 5–10.5)
POTASSIUM REFLEX MAGNESIUM: 3.7 MMOL/L (ref 3.5–5.1)
RBC # BLD: 4.55 M/UL (ref 4.2–5.9)
SODIUM BLD-SCNC: 142 MMOL/L (ref 136–145)
WBC # BLD: 11.9 K/UL (ref 4–11)

## 2019-08-12 PROCEDURE — 99232 SBSQ HOSP IP/OBS MODERATE 35: CPT | Performed by: INTERNAL MEDICINE

## 2019-08-12 PROCEDURE — 97530 THERAPEUTIC ACTIVITIES: CPT

## 2019-08-12 PROCEDURE — 83735 ASSAY OF MAGNESIUM: CPT

## 2019-08-12 PROCEDURE — 6370000000 HC RX 637 (ALT 250 FOR IP): Performed by: INTERNAL MEDICINE

## 2019-08-12 PROCEDURE — 6370000000 HC RX 637 (ALT 250 FOR IP): Performed by: STUDENT IN AN ORGANIZED HEALTH CARE EDUCATION/TRAINING PROGRAM

## 2019-08-12 PROCEDURE — 6360000002 HC RX W HCPCS: Performed by: STUDENT IN AN ORGANIZED HEALTH CARE EDUCATION/TRAINING PROGRAM

## 2019-08-12 PROCEDURE — 80048 BASIC METABOLIC PNL TOTAL CA: CPT

## 2019-08-12 PROCEDURE — 97116 GAIT TRAINING THERAPY: CPT

## 2019-08-12 PROCEDURE — 97535 SELF CARE MNGMENT TRAINING: CPT

## 2019-08-12 PROCEDURE — 92526 ORAL FUNCTION THERAPY: CPT

## 2019-08-12 PROCEDURE — 85025 COMPLETE CBC W/AUTO DIFF WBC: CPT

## 2019-08-12 PROCEDURE — 97166 OT EVAL MOD COMPLEX 45 MIN: CPT

## 2019-08-12 PROCEDURE — 2580000003 HC RX 258: Performed by: STUDENT IN AN ORGANIZED HEALTH CARE EDUCATION/TRAINING PROGRAM

## 2019-08-12 PROCEDURE — 94640 AIRWAY INHALATION TREATMENT: CPT

## 2019-08-12 PROCEDURE — 1200000000 HC SEMI PRIVATE

## 2019-08-12 PROCEDURE — 97162 PT EVAL MOD COMPLEX 30 MIN: CPT

## 2019-08-12 RX ORDER — CHLORPROMAZINE HYDROCHLORIDE 100 MG/1
100 TABLET, FILM COATED ORAL 2 TIMES DAILY
Status: DISCONTINUED | OUTPATIENT
Start: 2019-08-12 | End: 2019-08-12

## 2019-08-12 RX ORDER — CHLORPROMAZINE HYDROCHLORIDE 25 MG/1
150 TABLET, FILM COATED ORAL 2 TIMES DAILY
Status: DISCONTINUED | OUTPATIENT
Start: 2019-08-12 | End: 2019-08-12

## 2019-08-12 RX ORDER — CHLORPROMAZINE HYDROCHLORIDE 25 MG/1
25 TABLET, FILM COATED ORAL 2 TIMES DAILY
Status: DISCONTINUED | OUTPATIENT
Start: 2019-08-12 | End: 2019-08-13 | Stop reason: HOSPADM

## 2019-08-12 RX ORDER — POLYVINYL ALCOHOL 14 MG/ML
1 SOLUTION/ DROPS OPHTHALMIC 4 TIMES DAILY PRN
Status: DISCONTINUED | OUTPATIENT
Start: 2019-08-12 | End: 2019-08-12

## 2019-08-12 RX ORDER — CHLORPROMAZINE HYDROCHLORIDE 25 MG/1
50 TABLET, FILM COATED ORAL 2 TIMES DAILY
Status: DISCONTINUED | OUTPATIENT
Start: 2019-08-12 | End: 2019-08-12

## 2019-08-12 RX ADMIN — FUROSEMIDE 40 MG: 40 TABLET ORAL at 08:38

## 2019-08-12 RX ADMIN — Medication 10 ML: at 08:38

## 2019-08-12 RX ADMIN — Medication 10 ML: at 20:30

## 2019-08-12 RX ADMIN — CARVEDILOL 25 MG: 25 TABLET, FILM COATED ORAL at 08:38

## 2019-08-12 RX ADMIN — CHLORPROMAZINE HYDROCHLORIDE 25 MG: 25 TABLET, SUGAR COATED ORAL at 20:27

## 2019-08-12 RX ADMIN — DEXTRAN 70, AND HYPROMELLOSE 2910 1 DROP: 1; 3 SOLUTION/ DROPS OPHTHALMIC at 16:56

## 2019-08-12 RX ADMIN — DEXTRAN 70, AND HYPROMELLOSE 2910 1 DROP: 1; 3 SOLUTION/ DROPS OPHTHALMIC at 12:46

## 2019-08-12 RX ADMIN — PREDNISONE 20 MG: 20 TABLET ORAL at 08:38

## 2019-08-12 RX ADMIN — IPRATROPIUM BROMIDE AND ALBUTEROL SULFATE 1 AMPULE: .5; 3 SOLUTION RESPIRATORY (INHALATION) at 21:31

## 2019-08-12 RX ADMIN — CARVEDILOL 25 MG: 25 TABLET, FILM COATED ORAL at 16:59

## 2019-08-12 RX ADMIN — OLANZAPINE 20 MG: 5 TABLET, FILM COATED ORAL at 20:28

## 2019-08-12 RX ADMIN — CHLORPROMAZINE HYDROCHLORIDE 25 MG: 25 TABLET, SUGAR COATED ORAL at 12:27

## 2019-08-12 RX ADMIN — ENOXAPARIN SODIUM 40 MG: 40 INJECTION SUBCUTANEOUS at 08:38

## 2019-08-12 RX ADMIN — LISINOPRIL 10 MG: 10 TABLET ORAL at 08:38

## 2019-08-12 ASSESSMENT — ENCOUNTER SYMPTOMS
COUGH: 1
EYE DISCHARGE: 1
SHORTNESS OF BREATH: 0
EYE REDNESS: 1
EYE ITCHING: 1
VOMITING: 0
NAUSEA: 0

## 2019-08-12 ASSESSMENT — PAIN SCALES - WONG BAKER
WONGBAKER_NUMERICALRESPONSE: 0

## 2019-08-12 ASSESSMENT — PAIN SCALES - GENERAL
PAINLEVEL_OUTOF10: 0

## 2019-08-12 NOTE — PROGRESS NOTES
pulmonary vascular indistinctness, which can be seen in the setting of mild edema. Poor inspiratory volumes resulting in crowding of the bronchovascular markings. Assessment  1.  Acute on chronic hypercapnic respiratory failure  2. Suspected OSH/OHS  3.  Morbid obesity  4.  Acute encephalopathy  5.  Paranoid schizophrenia  6.  Hypertension  7.  Diabetes  8.  CAD  9. HFrEF - 35%     Plan  1.  Psych consult  2.  Cont prednisone 20 mg daily x 1 day then stop  3.  Continue Lasix 40 mg PO daily  4.  DuoNebs every 4 hours prn  5.  Cont Zyprexa  6. Tolerating diet  7.  PT/OT eval  8. Full Code    Close to D/C from Pulm perspective. Will need outpt PSG/PFTs. I will arrange at f/u with me in ~ 2 weeks.  Sister at bedside and aware and will schedule appt    Will sign off     Ingris Bonilla MD

## 2019-08-12 NOTE — PROGRESS NOTES
Indicated due to respiratory failure with self extubation with acute dysphonia however body habitus would likely not allow adequate visualization with MBS and pt would likely not be able to cooperate with instrumental assessment; discussed with sister who agrees this would not be appropriate    Pain: None indicated at time of session    Current Diet : Dental Soft    Treatment:  Pt seen bedside to address the following goals:  Goal 1: The patient will tolerate recommended diet without overt s/s of aspiration and without respiratory decline. 8/12 - Chart review does not reveal any documented difficulty on current recommended diet level. MD notes indicate pt tolerating diet adequately and breathing is much improved. Breath sounds are documented as clear in all lobes, oxygen demands significantly reduced at this time (titrated from . 5 Liters overnight at time of session) and pt remains afebrile. Pt not presents with normal vocal quality (previously dysphonic to aphonic). Pt assessed with soft regular solid and thin liquids without overt s/s of aspiration. Pt demonstrates reduced oral awareness and atypical oral phase with mastication of solids followed by pocketing most of bolus and taking more food into oral cavity but eventually cleared oral cavity for 100% of bolus at time of session (although sister does report baseline pocketing/holding in anterior/lateral sulcus. Suspect this is related to psych diagnosis/possibly medications. Encourage alternating textures. Patient consumed 8 ounces of water via cup in 2 trials of successive swallows without overt s/s of aspiration. No overt s/s of aspiration for any trials, no vocal quality changes. Pt demonstrating adequate diet tolerance at this time. Pt typically consumed general textures at baseline but family reports that he takes excessive amount of time to finish. Likely more appropriate on a dental soft diet. GOAL MET.       Goal 2: The patient/caregiver will demonstrate understanding of compensatory strategies for improved swallowing safety. 7/7 - Educated on rationale for BSE, potential impacts of intubation/self extubation, clinical significance of dysphonia, s/s of aspiration, importance of oral care, results/recommendations. Sister demonstrates comprehension. Continue goal  8/12 - Pts sister present for session. Educated on impulsivity while eating/drinking (sister reports this is baseline and related to OCD/Psych) and importance of oral care. Sister reports cleaning dentures yesterday. Provided pt/family with denture tabs and toothpaste. GOAL MET. Patient/Family/Caregiver Education:  As documented above. Compensatory Strategies: Alternate solids and liquids;Small bites/sips;Upright as possible for all oral intake;Remain upright for 30-45 minutes after meals(good oral care)     Plan:  Discharge from speech therapy  Diet recommendations: Dental Soft / Thins  DC recommendation: Continue to recommend supervision with meals due to behavior (impulsivity with self feeding, baseline pocketing/holding) which are not acute; Pts sister reports the pt resides in LTC and eats in a dining room. Treatment: 10  D/W nursingMelania  Needs met prior to leaving room, call button in reach, sister and RN x2 at bedside    Jair Chapman M.A., [facility]  Speech-Language Pathologist  Pg.  # [phone]    If patient is discharged prior to next treatment, this note will serve as the discharge summary

## 2019-08-12 NOTE — PROGRESS NOTES
Occupational Therapy   Occupational Therapy Initial Assessment/Tx Note  Date: 2019   Patient Name: Sveta Liu  MRN: [id number]     : 1955    Date of Service: 2019  Assessment: Pt's functional independence is decreased from baseline. Pt requires increased assist for functional mobility and ADLs due to impaired balance and decreased activity tolerance. Recommend pt return to prior level of care with increased assist initially and continued OT/PT. Discharge Recommendations: Sveta Liu scored a 17/24 on the AM-PAC ADL Inpatient form. Current research shows that an AM-PAC score of 17 or less is typically not associated with a discharge to the patient's home setting. Based on the patients AM-PAC score and their current ADL deficits, it is recommended that the patient have 3-5 sessions per week of Occupational Therapy at d/c to increase the patients independence. OT Equipment Recommendations  Equipment Needed: No    Assessment   Performance deficits / Impairments: Decreased functional mobility ; Decreased ADL status; Decreased strength;Decreased balance;Decreased endurance  Assessment: Pt's functional independence is decreased from baseline. Pt requires increased assist for functional mobility and ADLs due to impaired balance and decreased activity tolerance. Recommend pt return to prior level of care with increased assist initially and continued OT/PT. Treatment Diagnosis: Decreased activity tolerance, impaired ADLs and mobility  Decision Making: Medium Complexity  REQUIRES OT FOLLOW UP: Yes  Activity Tolerance  Activity Tolerance: Patient Tolerated treatment well  Safety Devices  Safety Devices in place: Yes  Type of devices: Call light within reach; Chair alarm in place;Nurse notified; Left in chair(assist level on board - Ax1-2 with gait belt)           Treatment Diagnosis: Decreased activity tolerance, impaired ADLs and mobility      Restrictions  Position Activity Restriction  Other position/activity restrictions: no activity restrictions noted    Subjective   General  Chart Reviewed: Yes  Additional Pertinent Hx: Pt is a 71 y.o. male adm 8/8 with respiratory failure. Brought to ED with altered mental status - found unresponsive. Put on Bipap in ED. Intub 8/8. Self extub 8/10. Head CT: neg. PMH: CAD, depression, DM, GERD, HTN, pacemaker, paranoid schizophrenia  Family / Caregiver Present: Yes(sister)  Referring Practitioner: Lauree Hamman  Diagnosis: Respiratory failure  Subjective  Subjective: Pt in bed on entry. Pleasant and cooperative. Fixated on not being able to explain his bathing routine. Pain Assessment  Pain Level: 0  Social/Functional History  Social/Functional History  Type of Home: Facility([place] [address])  Bathroom Shower/Tub: Walk-in shower  Bathroom Toilet: Handicap height  Bathroom Equipment: Grab bars around toilet, Grab bars in shower  ADL Assistance: Needs assistance(assist with showering)  Homemaking Assistance: Needs assistance  Ambulation Assistance: Independent(without AD)  Transfer Assistance: Independent  Additional Comments: Had a fall about 3 weeks ago- tripped over his own feet. History provided by pt and sister. Objective        Orientation  Overall Orientation Status: Within Functional Limits     Balance  Sitting Balance: Independent  Standing Balance: Minimal assistance  Standing Balance  Time: 1 min + 3 min + 2 min + 1 min   Activity: Functional mobility in room, bathroom for ADLs, hilario  Comment: Min assist sit to stand, min to mod assist for ambulation without AD. One major LOB when turning to sit on toilet requiring mod assist to correct. Min assist ambulation with rolling walker, including assist to maneuver walker.    Toilet Transfers  Toilet - Technique: Ambulating  Equipment Used: Standard toilet  Toilet Transfer: Minimal assistance  Toilet Transfers Comments: with grab bar  ADL  Feeding: Modified independent

## 2019-08-12 NOTE — DISCHARGE INSTR - COC
Labs or Other Treatments After Discharge:   Adjusted dose of thorazine to 25 po bid can add 25 mg afternoon dose if needed has not needed here  Follow up with dr. Anna Khoury in 2 weeks  Boost po TID  Monitor po intake closely  I would consider if needed patient get 25 mg thorazine on an as needed basis      Physician Certification: I certify the above information and transfer of Hadley Sun  is necessary for the continuing treatment of the diagnosis listed and that he requires [facility] for greater 30 days.      Update Admission H&P: Changes in H&P as follows - adjustment in thorazine    PHYSICIAN SIGNATURE:  Electronically signed by Mark Burns MD on 8/13/19 at 9:06 AM

## 2019-08-13 VITALS
TEMPERATURE: 97.8 F | WEIGHT: 237.2 LBS | DIASTOLIC BLOOD PRESSURE: 85 MMHG | OXYGEN SATURATION: 92 % | SYSTOLIC BLOOD PRESSURE: 130 MMHG | BODY MASS INDEX: 33.96 KG/M2 | HEIGHT: 70 IN | RESPIRATION RATE: 18 BRPM | HEART RATE: 78 BPM

## 2019-08-13 LAB
ANION GAP SERPL CALCULATED.3IONS-SCNC: 12 MMOL/L (ref 3–16)
BASOPHILS ABSOLUTE: 0.1 K/UL (ref 0–0.2)
BASOPHILS RELATIVE PERCENT: 0.8 %
BLOOD CULTURE, ROUTINE: NORMAL
BUN BLDV-MCNC: 35 MG/DL (ref 7–20)
CALCIUM SERPL-MCNC: 10 MG/DL (ref 8.3–10.6)
CHLORIDE BLD-SCNC: 105 MMOL/L (ref 99–110)
CO2: 26 MMOL/L (ref 21–32)
CREAT SERPL-MCNC: 1.4 MG/DL (ref 0.8–1.3)
CULTURE, BLOOD 2: NORMAL
EOSINOPHILS ABSOLUTE: 0.2 K/UL (ref 0–0.6)
EOSINOPHILS RELATIVE PERCENT: 2.2 %
GFR AFRICAN AMERICAN: >60
GFR NON-AFRICAN AMERICAN: 51
GLUCOSE BLD-MCNC: 157 MG/DL (ref 70–99)
HCT VFR BLD CALC: 44.2 % (ref 40.5–52.5)
HEMOGLOBIN: 14.2 G/DL (ref 13.5–17.5)
LYMPHOCYTES ABSOLUTE: 2.7 K/UL (ref 1–5.1)
LYMPHOCYTES RELATIVE PERCENT: 25.2 %
MAGNESIUM: 2.2 MG/DL (ref 1.8–2.4)
MCH RBC QN AUTO: 29.4 PG (ref 26–34)
MCHC RBC AUTO-ENTMCNC: 32 G/DL (ref 31–36)
MCV RBC AUTO: 91.7 FL (ref 80–100)
MONOCYTES ABSOLUTE: 1 K/UL (ref 0–1.3)
MONOCYTES RELATIVE PERCENT: 8.9 %
NEUTROPHILS ABSOLUTE: 6.7 K/UL (ref 1.7–7.7)
NEUTROPHILS RELATIVE PERCENT: 62.9 %
PDW BLD-RTO: 16.3 % (ref 12.4–15.4)
PLATELET # BLD: 220 K/UL (ref 135–450)
PMV BLD AUTO: 8.9 FL (ref 5–10.5)
POTASSIUM REFLEX MAGNESIUM: 4.1 MMOL/L (ref 3.5–5.1)
RBC # BLD: 4.82 M/UL (ref 4.2–5.9)
SODIUM BLD-SCNC: 143 MMOL/L (ref 136–145)
WBC # BLD: 10.7 K/UL (ref 4–11)

## 2019-08-13 PROCEDURE — 6370000000 HC RX 637 (ALT 250 FOR IP): Performed by: STUDENT IN AN ORGANIZED HEALTH CARE EDUCATION/TRAINING PROGRAM

## 2019-08-13 PROCEDURE — 2580000003 HC RX 258: Performed by: STUDENT IN AN ORGANIZED HEALTH CARE EDUCATION/TRAINING PROGRAM

## 2019-08-13 PROCEDURE — 6360000002 HC RX W HCPCS: Performed by: STUDENT IN AN ORGANIZED HEALTH CARE EDUCATION/TRAINING PROGRAM

## 2019-08-13 PROCEDURE — 6370000000 HC RX 637 (ALT 250 FOR IP): Performed by: INTERNAL MEDICINE

## 2019-08-13 PROCEDURE — 83735 ASSAY OF MAGNESIUM: CPT

## 2019-08-13 PROCEDURE — 36415 COLL VENOUS BLD VENIPUNCTURE: CPT

## 2019-08-13 PROCEDURE — 80048 BASIC METABOLIC PNL TOTAL CA: CPT

## 2019-08-13 PROCEDURE — 94640 AIRWAY INHALATION TREATMENT: CPT

## 2019-08-13 PROCEDURE — 85025 COMPLETE CBC W/AUTO DIFF WBC: CPT

## 2019-08-13 RX ORDER — IPRATROPIUM BROMIDE AND ALBUTEROL SULFATE 2.5; .5 MG/3ML; MG/3ML
3 SOLUTION RESPIRATORY (INHALATION) 2 TIMES DAILY
Qty: 360 ML | Refills: 0 | DISCHARGE
Start: 2019-08-13

## 2019-08-13 RX ORDER — PREDNISONE 10 MG/1
20 TABLET ORAL DAILY
Qty: 30 TABLET | Refills: 0 | Status: ON HOLD | DISCHARGE
Start: 2019-08-13 | End: 2019-09-09

## 2019-08-13 RX ORDER — LISINOPRIL 10 MG/1
10 TABLET ORAL DAILY
Qty: 30 TABLET | Refills: 3 | Status: ON HOLD | OUTPATIENT
Start: 2019-08-13 | End: 2020-10-16 | Stop reason: HOSPADM

## 2019-08-13 RX ORDER — CHLORPROMAZINE HYDROCHLORIDE 25 MG/1
25 TABLET, FILM COATED ORAL 2 TIMES DAILY
Qty: 60 TABLET | Refills: 0 | Status: ON HOLD | DISCHARGE
Start: 2019-08-13 | End: 2019-09-10 | Stop reason: HOSPADM

## 2019-08-13 RX ORDER — IPRATROPIUM BROMIDE AND ALBUTEROL SULFATE 2.5; .5 MG/3ML; MG/3ML
3 SOLUTION RESPIRATORY (INHALATION) EVERY 4 HOURS PRN
Qty: 360 ML | Refills: 0 | DISCHARGE
Start: 2019-08-13

## 2019-08-13 RX ADMIN — IPRATROPIUM BROMIDE AND ALBUTEROL SULFATE 1 AMPULE: .5; 3 SOLUTION RESPIRATORY (INHALATION) at 10:54

## 2019-08-13 RX ADMIN — PREDNISONE 20 MG: 20 TABLET ORAL at 09:57

## 2019-08-13 RX ADMIN — ENOXAPARIN SODIUM 40 MG: 40 INJECTION SUBCUTANEOUS at 09:58

## 2019-08-13 RX ADMIN — Medication 10 ML: at 09:58

## 2019-08-13 RX ADMIN — CARVEDILOL 25 MG: 25 TABLET, FILM COATED ORAL at 09:57

## 2019-08-13 ASSESSMENT — PAIN SCALES - WONG BAKER
WONGBAKER_NUMERICALRESPONSE: 0

## 2019-08-13 ASSESSMENT — PAIN SCALES - GENERAL
PAINLEVEL_OUTOF10: 0
PAINLEVEL_OUTOF10: 0

## 2019-08-13 NOTE — PROGRESS NOTES
Patient is asleep at this time, got up multiple times to use the bathroom, refuses help from staff. Unsteady gait, but no falls this shift. All fall precautions in place, bed in lowest position, locked and alarms on, call light within reach, door open.  Will continue to monitor

## 2019-08-13 NOTE — FLOWSHEET NOTE
08/13/19 1130   Encounter Summary   Services provided to: Patient and family together   Referral/Consult From: [facility] Family members   Continue Visiting   (Seen 8/13/19, DEAN. )   Complexity of Encounter Moderate   Length of Encounter 15 minutes   Routine   Type Initial   Assessment Approachable   Intervention Active listening;Nurtured hope   Outcome Expressed gratitude

## 2019-08-13 NOTE — PLAN OF CARE
Monitor respiration and oxygen saturation, monitor I&O, pain management with non pharmacological methods, assess skin Q shift, maintain safety and prevent falls

## 2019-08-19 ENCOUNTER — TELEPHONE (OUTPATIENT)
Dept: PULMONOLOGY | Age: 64
End: 2019-08-19

## 2019-08-27 ENCOUNTER — OFFICE VISIT (OUTPATIENT)
Dept: PULMONOLOGY | Age: 64
End: 2019-08-27
Payer: MEDICARE

## 2019-08-27 VITALS
SYSTOLIC BLOOD PRESSURE: 126 MMHG | WEIGHT: 244 LBS | BODY MASS INDEX: 34.93 KG/M2 | HEIGHT: 70 IN | HEART RATE: 62 BPM | DIASTOLIC BLOOD PRESSURE: 74 MMHG | RESPIRATION RATE: 16 BRPM | OXYGEN SATURATION: 91 %

## 2019-08-27 DIAGNOSIS — G47.33 OSA (OBSTRUCTIVE SLEEP APNEA): ICD-10-CM

## 2019-08-27 DIAGNOSIS — E66.01 CLASS 2 SEVERE OBESITY WITH SERIOUS COMORBIDITY AND BODY MASS INDEX (BMI) OF 35.0 TO 35.9 IN ADULT, UNSPECIFIED OBESITY TYPE (HCC): ICD-10-CM

## 2019-08-27 DIAGNOSIS — I50.22 CHRONIC SYSTOLIC (CONGESTIVE) HEART FAILURE (HCC): ICD-10-CM

## 2019-08-27 DIAGNOSIS — J96.22 ACUTE ON CHRONIC RESPIRATORY FAILURE WITH HYPERCAPNIA (HCC): Primary | ICD-10-CM

## 2019-08-27 PROCEDURE — G8427 DOCREV CUR MEDS BY ELIG CLIN: HCPCS | Performed by: INTERNAL MEDICINE

## 2019-08-27 PROCEDURE — 99214 OFFICE O/P EST MOD 30 MIN: CPT | Performed by: INTERNAL MEDICINE

## 2019-08-27 PROCEDURE — 3017F COLORECTAL CA SCREEN DOC REV: CPT | Performed by: INTERNAL MEDICINE

## 2019-08-27 PROCEDURE — 4040F PNEUMOC VAC/ADMIN/RCVD: CPT | Performed by: INTERNAL MEDICINE

## 2019-08-27 PROCEDURE — G8417 CALC BMI ABV UP PARAM F/U: HCPCS | Performed by: INTERNAL MEDICINE

## 2019-08-27 PROCEDURE — 1123F ACP DISCUSS/DSCN MKR DOCD: CPT | Performed by: INTERNAL MEDICINE

## 2019-08-27 PROCEDURE — 1036F TOBACCO NON-USER: CPT | Performed by: INTERNAL MEDICINE

## 2019-08-27 PROCEDURE — 1111F DSCHRG MED/CURRENT MED MERGE: CPT | Performed by: INTERNAL MEDICINE

## 2019-08-27 ASSESSMENT — SLEEP AND FATIGUE QUESTIONNAIRES
HOW LIKELY ARE YOU TO NOD OFF OR FALL ASLEEP WHILE LYING DOWN TO REST IN THE AFTERNOON WHEN CIRCUMSTANCES PERMIT: 2
HOW LIKELY ARE YOU TO NOD OFF OR FALL ASLEEP WHILE SITTING AND READING: 1
HOW LIKELY ARE YOU TO NOD OFF OR FALL ASLEEP IN A CAR, WHILE STOPPED FOR A FEW MINUTES IN TRAFFIC: 0
HOW LIKELY ARE YOU TO NOD OFF OR FALL ASLEEP WHILE SITTING INACTIVE IN A PUBLIC PLACE: 0
HOW LIKELY ARE YOU TO NOD OFF OR FALL ASLEEP WHILE SITTING QUIETLY AFTER LUNCH WITHOUT ALCOHOL: 0
HOW LIKELY ARE YOU TO NOD OFF OR FALL ASLEEP WHEN YOU ARE A PASSENGER IN A CAR FOR AN HOUR WITHOUT A BREAK: 0
HOW LIKELY ARE YOU TO NOD OFF OR FALL ASLEEP WHILE SITTING AND TALKING TO SOMEONE: 0
HOW LIKELY ARE YOU TO NOD OFF OR FALL ASLEEP WHILE WATCHING TV: 1
ESS TOTAL SCORE: 4

## 2019-09-07 ENCOUNTER — HOSPITAL ENCOUNTER (INPATIENT)
Age: 64
LOS: 3 days | Discharge: NURSING, REHAB OR LONG-TERM CARE | DRG: 189 | End: 2019-09-10
Attending: EMERGENCY MEDICINE | Admitting: INTERNAL MEDICINE
Payer: MEDICARE

## 2019-09-07 ENCOUNTER — APPOINTMENT (OUTPATIENT)
Dept: GENERAL RADIOLOGY | Age: 64
DRG: 189 | End: 2019-09-07
Payer: MEDICARE

## 2019-09-07 DIAGNOSIS — J96.21 ACUTE ON CHRONIC RESPIRATORY FAILURE WITH HYPOXIA AND HYPERCAPNIA (HCC): Primary | ICD-10-CM

## 2019-09-07 DIAGNOSIS — F31.9 BIPOLAR 1 DISORDER (HCC): ICD-10-CM

## 2019-09-07 DIAGNOSIS — J96.22 ACUTE ON CHRONIC RESPIRATORY FAILURE WITH HYPOXIA AND HYPERCAPNIA (HCC): Primary | ICD-10-CM

## 2019-09-07 PROBLEM — R09.02 HYPOXIA: Status: ACTIVE | Noted: 2019-09-07

## 2019-09-07 LAB
A/G RATIO: 1.2 (ref 1.1–2.2)
ALBUMIN SERPL-MCNC: 3.8 G/DL (ref 3.4–5)
ALP BLD-CCNC: 77 U/L (ref 40–129)
ALT SERPL-CCNC: 18 U/L (ref 10–40)
ANION GAP SERPL CALCULATED.3IONS-SCNC: 9 MMOL/L (ref 3–16)
AST SERPL-CCNC: 18 U/L (ref 15–37)
BASE EXCESS VENOUS: 11 (ref -3–3)
BILIRUB SERPL-MCNC: <0.2 MG/DL (ref 0–1)
BILIRUBIN URINE: NEGATIVE
BLOOD, URINE: NEGATIVE
BUN BLDV-MCNC: 20 MG/DL (ref 7–20)
CALCIUM SERPL-MCNC: 9.6 MG/DL (ref 8.3–10.6)
CHLORIDE BLD-SCNC: 102 MMOL/L (ref 99–110)
CLARITY: CLEAR
CO2: 30 MMOL/L (ref 21–32)
COLOR: YELLOW
CREAT SERPL-MCNC: 1.5 MG/DL (ref 0.8–1.3)
GFR AFRICAN AMERICAN: 57
GFR NON-AFRICAN AMERICAN: 47
GLOBULIN: 3.3 G/DL
GLUCOSE BLD-MCNC: 140 MG/DL (ref 70–99)
GLUCOSE URINE: NEGATIVE MG/DL
HCO3 VENOUS: 35.2 MMOL/L (ref 23–29)
HCT VFR BLD CALC: 36.8 % (ref 40.5–52.5)
HEMOGLOBIN: 11.4 G/DL (ref 13.5–17.5)
KETONES, URINE: NEGATIVE MG/DL
LACTATE: 1.37 MMOL/L (ref 0.4–2)
LEUKOCYTE ESTERASE, URINE: NEGATIVE
MCH RBC QN AUTO: 29.4 PG (ref 26–34)
MCHC RBC AUTO-ENTMCNC: 30.9 G/DL (ref 31–36)
MCV RBC AUTO: 95.2 FL (ref 80–100)
MICROSCOPIC EXAMINATION: NORMAL
NITRITE, URINE: NEGATIVE
O2 SAT, VEN: 63 %
PCO2, VEN: 51.2 MM HG (ref 40–50)
PDW BLD-RTO: 16.6 % (ref 12.4–15.4)
PERFORMED ON: ABNORMAL
PH UA: 6 (ref 5–8)
PH VENOUS: 7.45 (ref 7.35–7.45)
PLATELET # BLD: 199 K/UL (ref 135–450)
PMV BLD AUTO: 8.5 FL (ref 5–10.5)
PO2, VEN: 32 MM HG
POC SAMPLE TYPE: ABNORMAL
POTASSIUM REFLEX MAGNESIUM: 5 MMOL/L (ref 3.5–5.1)
PRO-BNP: 391 PG/ML (ref 0–124)
PROCALCITONIN: 0.06 NG/ML (ref 0–0.15)
PROTEIN UA: NEGATIVE MG/DL
RAPID INFLUENZA  B AGN: NEGATIVE
RAPID INFLUENZA A AGN: NEGATIVE
RBC # BLD: 3.87 M/UL (ref 4.2–5.9)
REASON FOR REJECTION: NORMAL
REJECTED TEST: NORMAL
SODIUM BLD-SCNC: 141 MMOL/L (ref 136–145)
SPECIFIC GRAVITY UA: 1.02 (ref 1–1.03)
TCO2 CALC VENOUS: 37 MMOL/L
TOTAL PROTEIN: 7.1 G/DL (ref 6.4–8.2)
TROPONIN: <0.01 NG/ML
URINE TYPE: NORMAL
UROBILINOGEN, URINE: 0.2 E.U./DL
WBC # BLD: 5.7 K/UL (ref 4–11)

## 2019-09-07 PROCEDURE — 94660 CPAP INITIATION&MGMT: CPT

## 2019-09-07 PROCEDURE — 82803 BLOOD GASES ANY COMBINATION: CPT

## 2019-09-07 PROCEDURE — 83605 ASSAY OF LACTIC ACID: CPT

## 2019-09-07 PROCEDURE — 87798 DETECT AGENT NOS DNA AMP: CPT

## 2019-09-07 PROCEDURE — 89220 SPUTUM SPECIMEN COLLECTION: CPT

## 2019-09-07 PROCEDURE — 80053 COMPREHEN METABOLIC PANEL: CPT

## 2019-09-07 PROCEDURE — 71045 X-RAY EXAM CHEST 1 VIEW: CPT

## 2019-09-07 PROCEDURE — 85027 COMPLETE CBC AUTOMATED: CPT

## 2019-09-07 PROCEDURE — 2580000003 HC RX 258: Performed by: STUDENT IN AN ORGANIZED HEALTH CARE EDUCATION/TRAINING PROGRAM

## 2019-09-07 PROCEDURE — 84484 ASSAY OF TROPONIN QUANT: CPT

## 2019-09-07 PROCEDURE — 87486 CHLMYD PNEUM DNA AMP PROBE: CPT

## 2019-09-07 PROCEDURE — 87581 M.PNEUMON DNA AMP PROBE: CPT

## 2019-09-07 PROCEDURE — 94761 N-INVAS EAR/PLS OXIMETRY MLT: CPT

## 2019-09-07 PROCEDURE — 6360000002 HC RX W HCPCS: Performed by: STUDENT IN AN ORGANIZED HEALTH CARE EDUCATION/TRAINING PROGRAM

## 2019-09-07 PROCEDURE — 87150 DNA/RNA AMPLIFIED PROBE: CPT

## 2019-09-07 PROCEDURE — 93005 ELECTROCARDIOGRAM TRACING: CPT | Performed by: STUDENT IN AN ORGANIZED HEALTH CARE EDUCATION/TRAINING PROGRAM

## 2019-09-07 PROCEDURE — 81003 URINALYSIS AUTO W/O SCOPE: CPT

## 2019-09-07 PROCEDURE — 87804 INFLUENZA ASSAY W/OPTIC: CPT

## 2019-09-07 PROCEDURE — 87040 BLOOD CULTURE FOR BACTERIA: CPT

## 2019-09-07 PROCEDURE — 36415 COLL VENOUS BLD VENIPUNCTURE: CPT

## 2019-09-07 PROCEDURE — 2700000000 HC OXYGEN THERAPY PER DAY

## 2019-09-07 PROCEDURE — 99285 EMERGENCY DEPT VISIT HI MDM: CPT

## 2019-09-07 PROCEDURE — 87633 RESP VIRUS 12-25 TARGETS: CPT

## 2019-09-07 PROCEDURE — 84145 PROCALCITONIN (PCT): CPT

## 2019-09-07 PROCEDURE — 83880 ASSAY OF NATRIURETIC PEPTIDE: CPT

## 2019-09-07 PROCEDURE — 2060000000 HC ICU INTERMEDIATE R&B

## 2019-09-07 RX ORDER — IPRATROPIUM BROMIDE AND ALBUTEROL SULFATE 2.5; .5 MG/3ML; MG/3ML
3 SOLUTION RESPIRATORY (INHALATION) EVERY 4 HOURS PRN
Status: DISCONTINUED | OUTPATIENT
Start: 2019-09-07 | End: 2019-09-10 | Stop reason: HOSPADM

## 2019-09-07 RX ORDER — MONTELUKAST SODIUM 10 MG/1
10 TABLET ORAL NIGHTLY
Status: ON HOLD | COMMUNITY
End: 2019-09-09

## 2019-09-07 RX ORDER — SENNA PLUS 8.6 MG/1
2 TABLET ORAL EVERY EVENING
Status: DISCONTINUED | OUTPATIENT
Start: 2019-09-07 | End: 2019-09-10 | Stop reason: HOSPADM

## 2019-09-07 RX ORDER — TROLAMINE SALICYLATE 10 G/100G
CREAM TOPICAL PRN
Status: DISCONTINUED | OUTPATIENT
Start: 2019-09-07 | End: 2019-09-10 | Stop reason: HOSPADM

## 2019-09-07 RX ORDER — ACETAMINOPHEN 325 MG/1
650 TABLET ORAL EVERY 4 HOURS PRN
Status: DISCONTINUED | OUTPATIENT
Start: 2019-09-07 | End: 2019-09-10 | Stop reason: HOSPADM

## 2019-09-07 RX ORDER — ONDANSETRON 2 MG/ML
4 INJECTION INTRAMUSCULAR; INTRAVENOUS EVERY 6 HOURS PRN
Status: DISCONTINUED | OUTPATIENT
Start: 2019-09-07 | End: 2019-09-10 | Stop reason: HOSPADM

## 2019-09-07 RX ORDER — SODIUM CHLORIDE 0.9 % (FLUSH) 0.9 %
10 SYRINGE (ML) INJECTION EVERY 12 HOURS SCHEDULED
Status: DISCONTINUED | OUTPATIENT
Start: 2019-09-07 | End: 2019-09-10 | Stop reason: HOSPADM

## 2019-09-07 RX ORDER — HEPARIN SODIUM 5000 [USP'U]/ML
5000 INJECTION, SOLUTION INTRAVENOUS; SUBCUTANEOUS EVERY 8 HOURS SCHEDULED
Status: DISCONTINUED | OUTPATIENT
Start: 2019-09-07 | End: 2019-09-10 | Stop reason: HOSPADM

## 2019-09-07 RX ORDER — CARVEDILOL 25 MG/1
25 TABLET ORAL 2 TIMES DAILY WITH MEALS
Status: DISCONTINUED | OUTPATIENT
Start: 2019-09-08 | End: 2019-09-10 | Stop reason: HOSPADM

## 2019-09-07 RX ORDER — SODIUM CHLORIDE 0.9 % (FLUSH) 0.9 %
10 SYRINGE (ML) INJECTION PRN
Status: DISCONTINUED | OUTPATIENT
Start: 2019-09-07 | End: 2019-09-10 | Stop reason: HOSPADM

## 2019-09-07 RX ORDER — MONTELUKAST SODIUM 10 MG/1
10 TABLET ORAL NIGHTLY
Status: DISCONTINUED | OUTPATIENT
Start: 2019-09-07 | End: 2019-09-10 | Stop reason: HOSPADM

## 2019-09-07 RX ADMIN — Medication 10 ML: at 22:22

## 2019-09-07 RX ADMIN — MEROPENEM 1 G: 1 INJECTION, POWDER, FOR SOLUTION INTRAVENOUS at 16:37

## 2019-09-07 RX ADMIN — VANCOMYCIN HYDROCHLORIDE 2000 MG: 10 INJECTION, POWDER, LYOPHILIZED, FOR SOLUTION INTRAVENOUS at 17:12

## 2019-09-07 ASSESSMENT — PAIN SCALES - GENERAL: PAINLEVEL_OUTOF10: 0

## 2019-09-08 LAB
AMPHETAMINE SCREEN, URINE: NORMAL
ANION GAP SERPL CALCULATED.3IONS-SCNC: 9 MMOL/L (ref 3–16)
BARBITURATE SCREEN URINE: NORMAL
BASOPHILS ABSOLUTE: 0 K/UL (ref 0–0.2)
BASOPHILS RELATIVE PERCENT: 0.5 %
BENZODIAZEPINE SCREEN, URINE: NORMAL
BUN BLDV-MCNC: 21 MG/DL (ref 7–20)
CALCIUM SERPL-MCNC: 9.6 MG/DL (ref 8.3–10.6)
CANNABINOID SCREEN URINE: NORMAL
CHLORIDE BLD-SCNC: 105 MMOL/L (ref 99–110)
CO2: 33 MMOL/L (ref 21–32)
COCAINE METABOLITE SCREEN URINE: NORMAL
CREAT SERPL-MCNC: 1.5 MG/DL (ref 0.8–1.3)
EKG ATRIAL RATE: 60 BPM
EKG DIAGNOSIS: NORMAL
EKG P-R INTERVAL: 122 MS
EKG Q-T INTERVAL: 514 MS
EKG QRS DURATION: 234 MS
EKG QTC CALCULATION (BAZETT): 514 MS
EKG R AXIS: 270 DEGREES
EKG T AXIS: 77 DEGREES
EKG VENTRICULAR RATE: 60 BPM
EOSINOPHILS ABSOLUTE: 0.2 K/UL (ref 0–0.6)
EOSINOPHILS RELATIVE PERCENT: 3.1 %
GFR AFRICAN AMERICAN: 57
GFR NON-AFRICAN AMERICAN: 47
GLUCOSE BLD-MCNC: 111 MG/DL (ref 70–99)
HCT VFR BLD CALC: 36.2 % (ref 40.5–52.5)
HEMOGLOBIN: 11.2 G/DL (ref 13.5–17.5)
LYMPHOCYTES ABSOLUTE: 1.9 K/UL (ref 1–5.1)
LYMPHOCYTES RELATIVE PERCENT: 26.8 %
Lab: NORMAL
MCH RBC QN AUTO: 29.3 PG (ref 26–34)
MCHC RBC AUTO-ENTMCNC: 31.1 G/DL (ref 31–36)
MCV RBC AUTO: 94.3 FL (ref 80–100)
METHADONE SCREEN, URINE: NORMAL
MONOCYTES ABSOLUTE: 0.7 K/UL (ref 0–1.3)
MONOCYTES RELATIVE PERCENT: 10.8 %
NEUTROPHILS ABSOLUTE: 4.1 K/UL (ref 1.7–7.7)
NEUTROPHILS RELATIVE PERCENT: 58.8 %
OPIATE SCREEN URINE: NORMAL
OXYCODONE URINE: NORMAL
PDW BLD-RTO: 16.2 % (ref 12.4–15.4)
PH UA: 6
PHENCYCLIDINE SCREEN URINE: NORMAL
PLATELET # BLD: 202 K/UL (ref 135–450)
PMV BLD AUTO: 8.4 FL (ref 5–10.5)
POTASSIUM REFLEX MAGNESIUM: 4.4 MMOL/L (ref 3.5–5.1)
PROPOXYPHENE SCREEN: NORMAL
RBC # BLD: 3.84 M/UL (ref 4.2–5.9)
REPORT: NORMAL
RESPIRATORY PANEL PCR: NORMAL
SODIUM BLD-SCNC: 147 MMOL/L (ref 136–145)
WBC # BLD: 6.9 K/UL (ref 4–11)

## 2019-09-08 PROCEDURE — 94660 CPAP INITIATION&MGMT: CPT

## 2019-09-08 PROCEDURE — 87449 NOS EACH ORGANISM AG IA: CPT

## 2019-09-08 PROCEDURE — 6370000000 HC RX 637 (ALT 250 FOR IP)

## 2019-09-08 PROCEDURE — 85025 COMPLETE CBC W/AUTO DIFF WBC: CPT

## 2019-09-08 PROCEDURE — 2580000003 HC RX 258

## 2019-09-08 PROCEDURE — 2060000000 HC ICU INTERMEDIATE R&B

## 2019-09-08 PROCEDURE — 6360000002 HC RX W HCPCS: Performed by: STUDENT IN AN ORGANIZED HEALTH CARE EDUCATION/TRAINING PROGRAM

## 2019-09-08 PROCEDURE — 87641 MR-STAPH DNA AMP PROBE: CPT

## 2019-09-08 PROCEDURE — 6360000002 HC RX W HCPCS

## 2019-09-08 PROCEDURE — 36415 COLL VENOUS BLD VENIPUNCTURE: CPT

## 2019-09-08 PROCEDURE — 2580000003 HC RX 258: Performed by: INTERNAL MEDICINE

## 2019-09-08 PROCEDURE — 2580000003 HC RX 258: Performed by: STUDENT IN AN ORGANIZED HEALTH CARE EDUCATION/TRAINING PROGRAM

## 2019-09-08 PROCEDURE — 80307 DRUG TEST PRSMV CHEM ANLYZR: CPT

## 2019-09-08 PROCEDURE — 80048 BASIC METABOLIC PNL TOTAL CA: CPT

## 2019-09-08 RX ORDER — SODIUM CHLORIDE 9 MG/ML
INJECTION, SOLUTION INTRAVENOUS CONTINUOUS
Status: ACTIVE | OUTPATIENT
Start: 2019-09-08 | End: 2019-09-08

## 2019-09-08 RX ADMIN — MEROPENEM 1 G: 1 INJECTION, POWDER, FOR SOLUTION INTRAVENOUS at 00:28

## 2019-09-08 RX ADMIN — OLANZAPINE 20 MG: 5 TABLET, FILM COATED ORAL at 03:32

## 2019-09-08 RX ADMIN — HEPARIN SODIUM 5000 UNITS: 5000 INJECTION INTRAVENOUS; SUBCUTANEOUS at 14:06

## 2019-09-08 RX ADMIN — VANCOMYCIN HYDROCHLORIDE 1500 MG: 10 INJECTION, POWDER, LYOPHILIZED, FOR SOLUTION INTRAVENOUS at 10:09

## 2019-09-08 RX ADMIN — MONTELUKAST SODIUM 10 MG: 10 TABLET, FILM COATED ORAL at 22:00

## 2019-09-08 RX ADMIN — CARVEDILOL 25 MG: 25 TABLET, FILM COATED ORAL at 17:55

## 2019-09-08 RX ADMIN — CARVEDILOL 25 MG: 25 TABLET, FILM COATED ORAL at 09:18

## 2019-09-08 RX ADMIN — MEROPENEM 1 G: 1 INJECTION, POWDER, FOR SOLUTION INTRAVENOUS at 17:16

## 2019-09-08 RX ADMIN — SENNOSIDES 17.2 MG: 8.6 TABLET, FILM COATED ORAL at 00:27

## 2019-09-08 RX ADMIN — MONTELUKAST SODIUM 10 MG: 10 TABLET, FILM COATED ORAL at 00:27

## 2019-09-08 RX ADMIN — HEPARIN SODIUM 5000 UNITS: 5000 INJECTION INTRAVENOUS; SUBCUTANEOUS at 00:27

## 2019-09-08 RX ADMIN — SENNOSIDES 17.2 MG: 8.6 TABLET, FILM COATED ORAL at 17:55

## 2019-09-08 RX ADMIN — HEPARIN SODIUM 5000 UNITS: 5000 INJECTION INTRAVENOUS; SUBCUTANEOUS at 22:00

## 2019-09-08 RX ADMIN — MEROPENEM 1 G: 1 INJECTION, POWDER, FOR SOLUTION INTRAVENOUS at 07:59

## 2019-09-08 RX ADMIN — SODIUM CHLORIDE: 9 INJECTION, SOLUTION INTRAVENOUS at 09:42

## 2019-09-08 ASSESSMENT — ENCOUNTER SYMPTOMS
WHEEZING: 0
CHEST TIGHTNESS: 0
ABDOMINAL DISTENTION: 0
DIARRHEA: 0
CONSTIPATION: 0
COUGH: 0
NAUSEA: 0
VOMITING: 0
SHORTNESS OF BREATH: 1
ABDOMINAL PAIN: 0

## 2019-09-08 ASSESSMENT — PAIN SCALES - GENERAL
PAINLEVEL_OUTOF10: 0
PAINLEVEL_OUTOF10: 0

## 2019-09-08 NOTE — PROGRESS NOTES
Respiratory Severity Index (RSI) score. 2. If the patient does not have documented COPD, consider discontinuing anticholinergics when RSI is less than 9.  3. If the bronchospasm worsens (increased RSI), then the bronchodilator frequency can be increased to a maximum of every 4 hours. If greater than every 4 hours is required, the physician will be contacted. 4. If the bronchospasm improves, the frequency of the bronchodilator can be decreased, based on the patient's RSI, but not less than home treatment regimen frequency. 5. Bronchodilator(s) will be discontinued if patient has a RSI less than 9 and has received no scheduled or as needed treatment for 72  Hrs. Patients Ordered on a Mucolytic Agent:    1. Must always be administered with a bronchodilator. 2. Discontinue if patient experiences worsened bronchospasm, or secretions have lessened to the point that the patient is able to clear them with a cough. Anti-inflammatory and Combination Medications:    1. If the patient lacks prior history of lung disease, is not using inhaled anti-inflammatory medication at home, and lacks wheezing by examination or by history for at least 24 hours, contact physician for possible discontinuation.

## 2019-09-08 NOTE — PROGRESS NOTES
abdominal pain, constipation, diarrhea, nausea and vomiting. Genitourinary: Negative for dysuria. Neurological: Negative for dizziness, weakness, light-headedness, numbness and headaches. Physical Exam   Constitutional: He appears well-developed and well-nourished. HENT:   Head: Normocephalic and atraumatic. Cardiovascular: Normal rate, regular rhythm, normal heart sounds and intact distal pulses. No murmur heard. Pulmonary/Chest: Effort normal. He has no wheezes. Abdominal: Soft. Bowel sounds are normal. He exhibits no distension. There is no tenderness. Musculoskeletal: He exhibits no edema. Neurological: He is alert. Skin: Skin is warm and dry. Capillary refill takes less than 2 seconds. He is not diaphoretic. No erythema. Psychiatric: Thought content is delusional.       LABS:    CBC:   Recent Labs     09/07/19  1547 09/08/19  0502   WBC 5.7 6.9   HGB 11.4* 11.2*   HCT 36.8* 36.2*    202   MCV 95.2 94.3     Renal:    Recent Labs     09/07/19  1428 09/08/19  0502    147*   K 5.0 4.4    105   CO2 30 33*   BUN 20 21*   CREATININE 1.5* 1.5*   GLUCOSE 140* 111*   CALCIUM 9.6 9.6   ANIONGAP 9 9     Hepatic:   Recent Labs     09/07/19  1428   AST 18   ALT 18   BILITOT <0.2   PROT 7.1   LABALBU 3.8   ALKPHOS 77     Troponin:   Recent Labs     09/07/19  1428   TROPONINI <0.01     BNP: No results for input(s): BNP in the last 72 hours. Lipids: No results for input(s): CHOL, HDL in the last 72 hours. Invalid input(s): LDLCALCU, TRIGLYCERIDE  ABGs:  No results for input(s): PHART, VFH9EQM, PO2ART, CYI6NCR, BEART, THGBART, D6MZCTJY, WYV9JFG in the last 72 hours. INR: No results for input(s): INR in the last 72 hours. Lactate:   Recent Labs     09/07/19  1413   LACTATE 1.37     Cultures:  -----------------------------------------------------------------  RAD:   XR CHEST PORTABLE   Final Result      Left basilar infiltrate suggesting atelectasis versus pneumonia. Assessment/Plan:   Acute respiratory failure; resolving  - Continue vanc/merrem for possible HCAP  - BiPAP prn  - UDS pending  - resp panel pending  - Legnionella, strep pneumo pending  - BCx pending.     JOSÉ  Likely 2/2 to reduced PO intake  - Holding home lisinopril  - Restarted IVF @ 75 for 12 hours     CHF chronic combined systolic and diastolic dysfunction -   Echo 8/2019 w/ EF of 30-35% and grade II diastolic dysfunction. BNP today is 391. Pt euvolemic, no signs of acute exacerbation.  - Cont home Carvedilol  - Hold home Home meds carvedilol and lisinopril  - Prn Hydralazine  - Strict I/O's, daily weights      Paranoid schizophrenia, Bipolar disorder.  - Continue home Olanzapine and oxcarbazepine  - Known prolonged QTc (~600), no hx of arrhythmias     DM2  A1C 6.2 (3/21/19).  Diet controled.   - POCT glu qAC and HS  - Carb contolled diet when not NPO  - LDSC     Code Status:Full code  FEN: gen diet  PPX: SQH  DISPO: Rajan Muñiz MD, PGY-1  09/08/19  6:59 AM    This patient has been staffed and discussed with Elieser Alatorre MD.

## 2019-09-08 NOTE — PROGRESS NOTES
4 Eyes Admission Assessment     I agree as the admission nurse that 2 RN's have performed a thorough Head to Toe Skin Assessment on the patient. ALL assessment sites listed below have been assessed on admission. Areas assessed by both nurses: yes  [x]   Head, Face, and Ears   [x]   Shoulders, Back, and Chest  [x]   Arms, Elbows, and Hands   [x]   Coccyx, Sacrum, and Ischum  [x]   Legs, Feet, and Heels heels dry and peeling        Does the Patient have Skin Breakdown?   No         Leonard Prevention initiated:  No   Wound Care Orders initiated:  No      [facility] nurse consulted for Pressure Injury (Stage 3,4, Unstageable, DTI, NWPT, and Complex wounds):  No      Nurse 1 eSignature: Electronically signed by Rema Dolan RN on 9/8/19 at 1:37 AM    **SHARE this note so that the co-signing nurse is able to place an eSignature**    Nurse 2 eSignature: Electronically signed by John Henry RN on 9/8/19 at 4:51 AM  '

## 2019-09-09 LAB
ANION GAP SERPL CALCULATED.3IONS-SCNC: 9 MMOL/L (ref 3–16)
BASOPHILS ABSOLUTE: 0 K/UL (ref 0–0.2)
BASOPHILS RELATIVE PERCENT: 0.5 %
BUN BLDV-MCNC: 19 MG/DL (ref 7–20)
CALCIUM SERPL-MCNC: 9.7 MG/DL (ref 8.3–10.6)
CHLORIDE BLD-SCNC: 106 MMOL/L (ref 99–110)
CO2: 30 MMOL/L (ref 21–32)
CREAT SERPL-MCNC: 1.2 MG/DL (ref 0.8–1.3)
EOSINOPHILS ABSOLUTE: 0.3 K/UL (ref 0–0.6)
EOSINOPHILS RELATIVE PERCENT: 4.6 %
GFR AFRICAN AMERICAN: >60
GFR NON-AFRICAN AMERICAN: >60
GLUCOSE BLD-MCNC: 106 MG/DL (ref 70–99)
HCT VFR BLD CALC: 38.9 % (ref 40.5–52.5)
HEMOGLOBIN: 12.2 G/DL (ref 13.5–17.5)
L. PNEUMOPHILA SEROGP 1 UR AG: NORMAL
LYMPHOCYTES ABSOLUTE: 1.7 K/UL (ref 1–5.1)
LYMPHOCYTES RELATIVE PERCENT: 26 %
MCH RBC QN AUTO: 29.7 PG (ref 26–34)
MCHC RBC AUTO-ENTMCNC: 31.2 G/DL (ref 31–36)
MCV RBC AUTO: 95 FL (ref 80–100)
MONOCYTES ABSOLUTE: 0.7 K/UL (ref 0–1.3)
MONOCYTES RELATIVE PERCENT: 10.3 %
MRSA SCREEN RT-PCR: NORMAL
NEUTROPHILS ABSOLUTE: 3.7 K/UL (ref 1.7–7.7)
NEUTROPHILS RELATIVE PERCENT: 58.6 %
PDW BLD-RTO: 16.2 % (ref 12.4–15.4)
PLATELET # BLD: 190 K/UL (ref 135–450)
PMV BLD AUTO: 8.4 FL (ref 5–10.5)
POTASSIUM REFLEX MAGNESIUM: 4.4 MMOL/L (ref 3.5–5.1)
RBC # BLD: 4.1 M/UL (ref 4.2–5.9)
REPORT: NORMAL
SODIUM BLD-SCNC: 145 MMOL/L (ref 136–145)
STREP PNEUMONIAE ANTIGEN, URINE: NORMAL
WBC # BLD: 6.4 K/UL (ref 4–11)

## 2019-09-09 PROCEDURE — 85025 COMPLETE CBC W/AUTO DIFF WBC: CPT

## 2019-09-09 PROCEDURE — 6360000002 HC RX W HCPCS: Performed by: STUDENT IN AN ORGANIZED HEALTH CARE EDUCATION/TRAINING PROGRAM

## 2019-09-09 PROCEDURE — 94660 CPAP INITIATION&MGMT: CPT

## 2019-09-09 PROCEDURE — 2580000003 HC RX 258: Performed by: STUDENT IN AN ORGANIZED HEALTH CARE EDUCATION/TRAINING PROGRAM

## 2019-09-09 PROCEDURE — 6360000002 HC RX W HCPCS

## 2019-09-09 PROCEDURE — 2060000000 HC ICU INTERMEDIATE R&B

## 2019-09-09 PROCEDURE — 36415 COLL VENOUS BLD VENIPUNCTURE: CPT

## 2019-09-09 PROCEDURE — 2580000003 HC RX 258

## 2019-09-09 PROCEDURE — 80048 BASIC METABOLIC PNL TOTAL CA: CPT

## 2019-09-09 PROCEDURE — 6370000000 HC RX 637 (ALT 250 FOR IP)

## 2019-09-09 RX ORDER — CALCIUM CARBONATE 200(500)MG
2 TABLET,CHEWABLE ORAL 2 TIMES DAILY PRN
COMMUNITY

## 2019-09-09 RX ORDER — LORATADINE 10 MG/1
10 TABLET ORAL DAILY
COMMUNITY
Start: 2019-08-16 | End: 2019-09-15

## 2019-09-09 RX ORDER — FUROSEMIDE 20 MG/1
20 TABLET ORAL DAILY
COMMUNITY
Start: 2019-09-06 | End: 2019-09-10

## 2019-09-09 RX ADMIN — CARVEDILOL 25 MG: 25 TABLET, FILM COATED ORAL at 09:05

## 2019-09-09 RX ADMIN — CARVEDILOL 25 MG: 25 TABLET, FILM COATED ORAL at 17:25

## 2019-09-09 RX ADMIN — OLANZAPINE 20 MG: 5 TABLET, FILM COATED ORAL at 20:49

## 2019-09-09 RX ADMIN — Medication 10 ML: at 20:50

## 2019-09-09 RX ADMIN — MEROPENEM 1 G: 1 INJECTION, POWDER, FOR SOLUTION INTRAVENOUS at 00:48

## 2019-09-09 RX ADMIN — MEROPENEM 1 G: 1 INJECTION, POWDER, FOR SOLUTION INTRAVENOUS at 09:05

## 2019-09-09 RX ADMIN — HEPARIN SODIUM 5000 UNITS: 5000 INJECTION INTRAVENOUS; SUBCUTANEOUS at 05:44

## 2019-09-09 RX ADMIN — VANCOMYCIN HYDROCHLORIDE 1500 MG: 10 INJECTION, POWDER, LYOPHILIZED, FOR SOLUTION INTRAVENOUS at 05:44

## 2019-09-09 RX ADMIN — HEPARIN SODIUM 5000 UNITS: 5000 INJECTION INTRAVENOUS; SUBCUTANEOUS at 20:49

## 2019-09-09 RX ADMIN — SENNOSIDES 17.2 MG: 8.6 TABLET, FILM COATED ORAL at 17:25

## 2019-09-09 RX ADMIN — Medication 10 ML: at 09:06

## 2019-09-09 RX ADMIN — MEROPENEM 1 G: 1 INJECTION, POWDER, FOR SOLUTION INTRAVENOUS at 17:25

## 2019-09-09 RX ADMIN — HEPARIN SODIUM 5000 UNITS: 5000 INJECTION INTRAVENOUS; SUBCUTANEOUS at 14:46

## 2019-09-09 RX ADMIN — MONTELUKAST SODIUM 10 MG: 10 TABLET, FILM COATED ORAL at 20:49

## 2019-09-09 ASSESSMENT — ENCOUNTER SYMPTOMS
VOMITING: 0
DIARRHEA: 0
ABDOMINAL DISTENTION: 0
CHEST TIGHTNESS: 0
CONSTIPATION: 0
SHORTNESS OF BREATH: 1
WHEEZING: 0
ABDOMINAL PAIN: 0
COUGH: 0
NAUSEA: 0

## 2019-09-09 ASSESSMENT — PAIN SCALES - GENERAL
PAINLEVEL_OUTOF10: 0

## 2019-09-09 NOTE — PROGRESS NOTES
Progress Note    Admit Date: 9/7/2019  Day: 1  Diet: DIET GENERAL;    CC: sob    Interval history: No acute events overnight. Patient is resting comfortably on bipap, He states his sob is improved and feels much better. Continues to complain of an animal in his chest to staff. Denies any other complaints. HPI: 72 yo M PMH schizophrenia, bipolar, SSS s/p pacemaker, presents to ED with SOB. Per nursing home facility, pt was SOB, transferred to [facility]. On the way to ED, satting down to 80's, started on 6L NC. In ED, started on bipap. Was on bipap for hours, then transitioned to 2L NC with improvement for O2 sats to 95%. Mental status also improved during this time.     Medications:     Scheduled Meds:   OLANZapine  20 mg Oral Nightly    vancomycin  1,500 mg Intravenous Q18H    sodium chloride flush  10 mL Intravenous 2 times per day    heparin (porcine)  5,000 Units Subcutaneous 3 times per day    meropenem  1 g Intravenous Q8H    carvedilol  25 mg Oral BID WC    montelukast  10 mg Oral Nightly    senna  2 tablet Oral QPM     Continuous Infusions:  PRN Meds:sodium chloride flush, magnesium hydroxide, ondansetron, acetaminophen, ipratropium-albuterol, trolamine salicylate    Objective:   Vitals:   T-max:  Patient Vitals for the past 8 hrs:   BP Temp Temp src Pulse Resp SpO2   09/09/19 0437 -- -- -- -- 18 --   09/09/19 0115 -- -- -- -- 16 --   09/09/19 0048 (!) 149/94 99.3 °F (37.4 °C) Oral 60 18 97 %       Intake/Output Summary (Last 24 hours) at 9/9/2019 9063  Last data filed at 9/8/2019 2326  Gross per 24 hour   Intake 1408 ml   Output 450 ml   Net 958 ml       Review of Systems   Constitutional: Negative for chills and fever. Respiratory: Positive for shortness of breath. Negative for cough, chest tightness and wheezing. Cardiovascular: Negative for chest pain and palpitations. Gastrointestinal: Negative for abdominal distention, abdominal pain, constipation, diarrhea, nausea and vomiting.

## 2019-09-09 NOTE — PROGRESS NOTES
Clinical Pharmacy Progress Note  Medication History     Admit Date: 9/7/2019    List of of current medications patient is taking is complete. Home Medication list in EPIC updated to reflect changes noted below. Source of information: med list from DR [facility]OW[facility]; forms dated 9/9/2019    Changes made to medication list:   Medications removed: (include reason, ex: therapy completed, inactive medication)   Prednisone - not on med list from [facility]   Montelukast - not on med list from NH    Medications added:    Furosemide 20 mg daily x 5 days (9/6/19-9/10/19)   Calcium carbonate chewable tab    Loratadine    Medication doses adjusted:    Hydrocortisone 1% cream - applied to face daily PRN itchy skin   Artificial tears solution (hypromellose 0.4%) 2 drops in both eyes q4h PRN drye eyes   Ketoconazole 2% shampoo - pt uses twice weekly    Omega 3 -- added dose of 1g daily    Please call with any questions.   Shelia Kelley PharmD, [facility]  Wireless # [phone]  9/9/2019 8:59 AM

## 2019-09-09 NOTE — DISCHARGE SUMMARY
Hospital Medicine Discharge Summary       Patient: Jefry Nix     MRN: [id number]  Gender: male  : 1955   Age: 71 y.o. Code Status: Full Code     Admit Date: 2019   Discharge Date:   9/10/19  Disposition:  Home or Self Care     Primary Care Provider: Erwin Solorzano MD    Admitting Physician: Grisel Pak MD  Discharge Physician: Terrie Rosario MD            Discharge Diagnoses:    Patient Active Problem List   Diagnosis    Respiratory failure ([facility] [address]    Hypoxia          HPI on admission:  70 yo M PMH schizophrenia, bipolar, SSS s/p pacemaker, presents to ED with SOB. Per nursing home facility, pt was SOB, transferred to [facility]. On the way to ED, satting down to 80's, started on 6L NC. In ED, started on bipap. Was on bipap for hours, then transitioned to 2L NC with improvement for O2 sats to 95%. Mental status also improved during this time.     Brief Hospital Course:   Acute respiratory failure 2/2 possible polypharmacy of psychiatric medications  - Patient on many psychiatric medications with sedative effects for multiple years, acute respiratory failure possibly due to additive effects of medications, will need to follow up with psychiatrist to evaluate medications as polypharmacy could be contributory to altered mental status upon admission.  - Held home Thorazine and Trileptal, continued home Zyprexa  - Continued home Bipap overnight with improvement in respiratory status and mentation.  - Continued to require 2L O2 as patient was not initiating many deep breaths and would desat to mid 80%  - Patient was treated with vanc/merrem for possible HCAP; CXR showed left basilar infiltrate suggesting atelectasis versus pneumonia. MRSA nasal probe negative and vanc was stopped after 3 days. Josh Donnie continued for 4 days. At discharge will finish a course of antibiotics with doxycycline for 3 more days empirically.   - HCAP/pneumonia ruled out as respiratory panel, legionella, strep pneumo, mrsa probe tenderness. Musculoskeletal: He exhibits no edema. Neurological: He is alert. Skin: Skin is warm and dry. Capillary refill takes less than 2 seconds. He is not diaphoretic. No erythema. Psychiatric: Thought content is delusional.           Significant Test Results   Radiology:  XR CHEST PORTABLE   Final Result      Left basilar infiltrate suggesting atelectasis versus pneumonia. Consults:     PHARMACY TO DOSE VANCOMYCIN  IP CONSULT TO HOSPITALIST  PHARMACY TO DOSE VANCOMYCIN    Labs: For convenience and continuity at follow-up the following most recent labs are provided:    Lab Results   Component Value Date    WBC 6.1 09/10/2019    HGB 11.4 09/10/2019    HCT 35.9 09/10/2019    MCV 93.0 09/10/2019     09/10/2019     09/10/2019    K 4.3 09/10/2019     09/10/2019    CO2 30 09/10/2019    BUN 14 09/10/2019    CREATININE 1.2 09/10/2019    CALCIUM 9.5 09/10/2019    PHOS 3.5 01/01/2010    ALKPHOS 77 09/07/2019    ALT 18 09/07/2019    AST 18 09/07/2019    BILITOT <0.2 09/07/2019    BILIDIR <0.2 08/08/2019    LABALBU 3.8 09/07/2019     Lab Results   Component Value Date    INR 1.26 (H) 01/01/2010       Treatments: as mentioned above.    DISCHARGE MEDICATION:     Medication List      START taking these medications    doxycycline monohydrate 100 MG tablet  Commonly known as:  ADOXA  Take 1 tablet by mouth 2 times daily for 3 days  Notes to patient:  Doxycycline  (Vibramycin)  USE--  Treat bacterial infection  SIDE EFFECTS--upset stomach, headache        CONTINUE taking these medications    acetaminophen 325 MG tablet  Commonly known as:  TYLENOL     * calcium carbonate 500 MG chewable tablet  Commonly known as:  TUMS     * calcium carbonate 500 MG chewable tablet  Commonly known as:  TUMS     carvedilol 25 MG tablet  Commonly known as:  COREG     dextromethorphan-guaiFENesin  MG per extended release tablet  Commonly known as:  MUCINEX DM     fluocinonide 0.05 % external

## 2019-09-10 VITALS
DIASTOLIC BLOOD PRESSURE: 91 MMHG | TEMPERATURE: 98.1 F | WEIGHT: 236.55 LBS | HEIGHT: 70 IN | SYSTOLIC BLOOD PRESSURE: 138 MMHG | HEART RATE: 61 BPM | BODY MASS INDEX: 33.87 KG/M2 | OXYGEN SATURATION: 96 % | RESPIRATION RATE: 21 BRPM

## 2019-09-10 LAB
ANION GAP SERPL CALCULATED.3IONS-SCNC: 7 MMOL/L (ref 3–16)
BASOPHILS ABSOLUTE: 0 K/UL (ref 0–0.2)
BASOPHILS RELATIVE PERCENT: 0.3 %
BUN BLDV-MCNC: 14 MG/DL (ref 7–20)
CALCIUM SERPL-MCNC: 9.5 MG/DL (ref 8.3–10.6)
CHLORIDE BLD-SCNC: 105 MMOL/L (ref 99–110)
CO2: 30 MMOL/L (ref 21–32)
CREAT SERPL-MCNC: 1.2 MG/DL (ref 0.8–1.3)
EOSINOPHILS ABSOLUTE: 0.2 K/UL (ref 0–0.6)
EOSINOPHILS RELATIVE PERCENT: 3.8 %
GFR AFRICAN AMERICAN: >60
GFR NON-AFRICAN AMERICAN: >60
GLUCOSE BLD-MCNC: 87 MG/DL (ref 70–99)
GLUCOSE BLD-MCNC: 96 MG/DL (ref 70–99)
HCT VFR BLD CALC: 35.9 % (ref 40.5–52.5)
HEMOGLOBIN: 11.4 G/DL (ref 13.5–17.5)
LYMPHOCYTES ABSOLUTE: 1.6 K/UL (ref 1–5.1)
LYMPHOCYTES RELATIVE PERCENT: 27.1 %
MCH RBC QN AUTO: 29.5 PG (ref 26–34)
MCHC RBC AUTO-ENTMCNC: 31.7 G/DL (ref 31–36)
MCV RBC AUTO: 93 FL (ref 80–100)
MONOCYTES ABSOLUTE: 0.7 K/UL (ref 0–1.3)
MONOCYTES RELATIVE PERCENT: 10.9 %
NEUTROPHILS ABSOLUTE: 3.5 K/UL (ref 1.7–7.7)
NEUTROPHILS RELATIVE PERCENT: 57.9 %
PDW BLD-RTO: 15.9 % (ref 12.4–15.4)
PERFORMED ON: NORMAL
PLATELET # BLD: 204 K/UL (ref 135–450)
PMV BLD AUTO: 8.1 FL (ref 5–10.5)
POTASSIUM REFLEX MAGNESIUM: 4.3 MMOL/L (ref 3.5–5.1)
RBC # BLD: 3.86 M/UL (ref 4.2–5.9)
SODIUM BLD-SCNC: 142 MMOL/L (ref 136–145)
WBC # BLD: 6.1 K/UL (ref 4–11)

## 2019-09-10 PROCEDURE — 6360000002 HC RX W HCPCS

## 2019-09-10 PROCEDURE — 94680 O2 UPTK RST&XERS DIR SIMPLE: CPT

## 2019-09-10 PROCEDURE — 6370000000 HC RX 637 (ALT 250 FOR IP)

## 2019-09-10 PROCEDURE — 94761 N-INVAS EAR/PLS OXIMETRY MLT: CPT

## 2019-09-10 PROCEDURE — 94660 CPAP INITIATION&MGMT: CPT

## 2019-09-10 PROCEDURE — 2700000000 HC OXYGEN THERAPY PER DAY

## 2019-09-10 PROCEDURE — 2580000003 HC RX 258

## 2019-09-10 PROCEDURE — 80048 BASIC METABOLIC PNL TOTAL CA: CPT

## 2019-09-10 PROCEDURE — 2580000003 HC RX 258: Performed by: STUDENT IN AN ORGANIZED HEALTH CARE EDUCATION/TRAINING PROGRAM

## 2019-09-10 PROCEDURE — 6360000002 HC RX W HCPCS: Performed by: STUDENT IN AN ORGANIZED HEALTH CARE EDUCATION/TRAINING PROGRAM

## 2019-09-10 PROCEDURE — 36415 COLL VENOUS BLD VENIPUNCTURE: CPT

## 2019-09-10 PROCEDURE — 85025 COMPLETE CBC W/AUTO DIFF WBC: CPT

## 2019-09-10 RX ORDER — LEVOFLOXACIN 750 MG/1
750 TABLET ORAL DAILY
Qty: 4 TABLET | Refills: 0 | Status: CANCELLED | OUTPATIENT
Start: 2019-09-10 | End: 2019-09-14

## 2019-09-10 RX ORDER — DOXYCYCLINE 100 MG/1
100 TABLET ORAL 2 TIMES DAILY
Qty: 6 TABLET | Refills: 0 | Status: SHIPPED | OUTPATIENT
Start: 2019-09-10 | End: 2019-09-13

## 2019-09-10 RX ORDER — AMOXICILLIN AND CLAVULANATE POTASSIUM 875; 125 MG/1; MG/1
1 TABLET, FILM COATED ORAL 2 TIMES DAILY
Qty: 6 TABLET | Refills: 0 | Status: CANCELLED | OUTPATIENT
Start: 2019-09-10 | End: 2019-09-13

## 2019-09-10 RX ADMIN — MEROPENEM 1 G: 1 INJECTION, POWDER, FOR SOLUTION INTRAVENOUS at 01:00

## 2019-09-10 RX ADMIN — HEPARIN SODIUM 5000 UNITS: 5000 INJECTION INTRAVENOUS; SUBCUTANEOUS at 13:43

## 2019-09-10 RX ADMIN — HEPARIN SODIUM 5000 UNITS: 5000 INJECTION INTRAVENOUS; SUBCUTANEOUS at 06:05

## 2019-09-10 RX ADMIN — CARVEDILOL 25 MG: 25 TABLET, FILM COATED ORAL at 08:15

## 2019-09-10 RX ADMIN — MEROPENEM 1 G: 1 INJECTION, POWDER, FOR SOLUTION INTRAVENOUS at 08:00

## 2019-09-10 RX ADMIN — Medication 10 ML: at 08:01

## 2019-09-10 ASSESSMENT — PAIN SCALES - GENERAL
PAINLEVEL_OUTOF10: 0

## 2019-09-10 NOTE — PROGRESS NOTES
Uneventfully removed saline lock, site is unremarkable. Bandage to site. Patient was escorted off the unit per wheelchair. Patient was discharged on oxygen at 2 liters per nasal cannula. Transported by his sister. All personal items were released with pt at time of discharge.

## 2019-09-10 NOTE — CARE COORDINATION
Case Management Assessment            Discharge Note                    Date / Time of Note: 9/10/2019 3:23 PM                  Discharge Note Completed by: Arturo Gonsales    Patient Name: Lauren Mehta   YOB: 1955  Diagnosis: Hypoxia [R09.02]  Hypoxia [R09.02]   Date / Time: 9/7/2019  1:47 PM    Current PCP: Isiah Marshall MD  Clinic patient: No    Hospitalization in the last 30 days: Yes    Advance Directives:  Code Status: Full Code  [place] DNR form completed and on chart: Not Indicated    Financial:  Payor: Penny Ruby / Plan: Sandro Lard / Product Type: *No Product type* /      Pharmacy:    [address]  [address] [facility] [address]  Phone: [phone] Fax: [phone]    [id number] [address] [phone] - F [phone]  [address] [place] [address]  Phone: [phone] Fax: [phone]      Assistance purchasing medications?: Potential Assistance Purchasing Medications: No  Assistance provided by Case Management: None at this time    Does patient want to participate in local refill/ meds to beds program?: Not Assessed    Meds To Beds General Rules:  1. Can ONLY be done Monday- Friday between 8:30am-5pm  2. Prescription(s) must be in pharmacy by 3pm to be filled same day  3. Copy of patient's insurance/ prescription drug card and patient face sheet must be sent along with the prescription(s)  4. Cost of Rx cannot be added to hospital bill. If financial assistance is needed, please contact unit  or ;  or  CANNOT provide pharmacy voucher for patients co-pays  5.  Patients can then  the prescription on their way out of the hospital at discharge, or pharmacy can deliver to the bedside if staff is available. (payment due at time of pick-up or delivery - cash,

## 2019-09-10 NOTE — PLAN OF CARE
Problem: Falls - Risk of:  Goal: Will remain free from falls  Description  Will remain free from falls  9/10/2019 1006 by Sabrina Moreno RN  Outcome: Ongoing  Note:   Fall precautions are in place. Pt is wearing non-skid footwear. Call light and frequently used items are within pt's reach. Bed alarm is on and bed is in the lowest position with side rails up X 2/4. The wheels of the bed are locked. Patient calls out for assistance prn. Problem: Falls - Risk of:  Goal: Absence of physical injury  Description  Absence of physical injury  9/10/2019 1006 by Sabrina Moreno RN  Outcome: Ongoing  Note:   No falls or injuries occurred. Will continue with current safety precautions for now.

## 2019-09-10 NOTE — PROGRESS NOTES
Oxygen was off as directed by medical resident. Room air pulse ox was 88%. Oxygen was returned at 2 L. Pulse ox is 94%. Will continue to attempt to wean pt off of oxygen.

## 2019-09-10 NOTE — PROGRESS NOTES
[facility]    Respiratory Therapy     Home Oxygen Evaluation        Name: Arnol Mojica  Medical Record Number: [id number]  Age: 71 y.o. Gender:  male   : 1955  Today's date: 9/10/2019  Room: [address] [place] [address]      Assessment        BP (!) 164/127   Pulse 60   Temp 98.6 °F (37 °C) (Oral)   Resp 22   Ht 5' 10\" (1.778 m)   Wt 236 lb 8.9 oz (107.3 kg)   SpO2 94%   BMI 33.94 kg/m²     Patient Active Problem List   Diagnosis    Respiratory failure ([facility] [address]    Hypoxia       Social History:  Social History     Tobacco Use    Smoking status: Never Smoker    Smokeless tobacco: Never Used   Substance Use Topics    Alcohol use: No    Drug use: No       Patient Room Air saturation at rest 86  %  Patient Room Air saturation upon ambulation pT HAS NOT BEEN ABLE TO AMBULATE @ ALL THIS ADMISSION    Oxygen saturations of 88% or less on RA qualifies patient for Home Oxygen    Patient resting on 0  lmp  with an oxygen saturation of  86 %     Patient @ rest then on 2 lpm with an oxygen saturation of 96%      Qualifying patient for home oxygen with ambulation and continuous flow  @ 2 lpm.      In your clinical assessment does the Patient Require Portable Oxygen Tanks?     Yes               Patient/caregiver was educated on Home Oxygen process:  Yes      Level of patient/caregiver understanding able to:   [x] Verbalize understanding   [] Demonstrate understanding       [x] Teach back        [] Needs reinforcement        []  No available caregiver               [x]  Other: with family present as well all questions answered     Response to education:  Very Good     Time Spent with Home O2 Set Up:  15  minutes     Lesa Maguire RCP on 9/10/2019 at 4:29 PM                 .

## 2019-09-11 LAB
BLOOD CULTURE, ROUTINE: ABNORMAL
BLOOD CULTURE, ROUTINE: ABNORMAL
ORGANISM: ABNORMAL
ORGANISM: ABNORMAL

## 2019-09-12 LAB — CULTURE, BLOOD 2: NORMAL

## 2019-10-07 ENCOUNTER — HOSPITAL ENCOUNTER (OUTPATIENT)
Dept: SLEEP CENTER | Age: 64
Discharge: HOME | End: 2019-10-07
Payer: COMMERCIAL

## 2019-10-07 DIAGNOSIS — J96.22 ACUTE ON CHRONIC RESPIRATORY FAILURE WITH HYPOXIA AND HYPERCAPNIA (HCC): ICD-10-CM

## 2019-10-07 DIAGNOSIS — J96.22 ACUTE ON CHRONIC RESPIRATORY FAILURE WITH HYPERCAPNIA (HCC): ICD-10-CM

## 2019-10-07 DIAGNOSIS — G47.33 OSA (OBSTRUCTIVE SLEEP APNEA): ICD-10-CM

## 2019-10-07 DIAGNOSIS — J96.21 ACUTE ON CHRONIC RESPIRATORY FAILURE WITH HYPOXIA AND HYPERCAPNIA (HCC): ICD-10-CM

## 2019-10-07 PROCEDURE — 95810 POLYSOM 6/> YRS 4/> PARAM: CPT

## 2019-10-08 PROCEDURE — 95810 POLYSOM 6/> YRS 4/> PARAM: CPT | Performed by: PSYCHIATRY & NEUROLOGY

## 2019-10-10 ENCOUNTER — TELEPHONE (OUTPATIENT)
Dept: SLEEP MEDICINE | Age: 64
End: 2019-10-10

## 2019-10-10 DIAGNOSIS — G47.33 OSA (OBSTRUCTIVE SLEEP APNEA): Primary | ICD-10-CM

## 2019-10-22 ENCOUNTER — HOSPITAL ENCOUNTER (OUTPATIENT)
Dept: PULMONOLOGY | Age: 64
Discharge: HOME | End: 2019-10-22
Payer: MEDICARE

## 2019-10-22 VITALS — OXYGEN SATURATION: 91 %

## 2019-10-22 DIAGNOSIS — J96.22 ACUTE ON CHRONIC RESPIRATORY FAILURE WITH HYPERCAPNIA (HCC): ICD-10-CM

## 2019-10-22 PROCEDURE — 94010 BREATHING CAPACITY TEST: CPT

## 2019-10-22 PROCEDURE — 94760 N-INVAS EAR/PLS OXIMETRY 1: CPT

## 2019-10-22 PROCEDURE — 94726 PLETHYSMOGRAPHY LUNG VOLUMES: CPT

## 2019-10-22 PROCEDURE — 94664 DEMO&/EVAL PT USE INHALER: CPT

## 2019-10-22 PROCEDURE — 94729 DIFFUSING CAPACITY: CPT

## 2019-10-22 RX ORDER — ALBUTEROL SULFATE 2.5 MG/3ML
2.5 SOLUTION RESPIRATORY (INHALATION) ONCE
Status: DISCONTINUED | OUTPATIENT
Start: 2019-10-22 | End: 2019-10-23 | Stop reason: HOSPADM

## 2019-12-05 ENCOUNTER — HOSPITAL ENCOUNTER (OUTPATIENT)
Dept: SLEEP CENTER | Age: 64
Discharge: HOME | End: 2019-12-05
Payer: MEDICARE

## 2019-12-05 PROCEDURE — 95811 POLYSOM 6/>YRS CPAP 4/> PARM: CPT

## 2019-12-05 PROCEDURE — 95811 POLYSOM 6/>YRS CPAP 4/> PARM: CPT | Performed by: PSYCHIATRY & NEUROLOGY

## 2019-12-09 ENCOUNTER — HOSPITAL ENCOUNTER (EMERGENCY)
Age: 64
Discharge: HOME | End: 2019-12-09
Attending: EMERGENCY MEDICINE
Payer: MEDICARE

## 2019-12-09 VITALS
DIASTOLIC BLOOD PRESSURE: 100 MMHG | RESPIRATION RATE: 20 BRPM | TEMPERATURE: 98 F | WEIGHT: 243 LBS | SYSTOLIC BLOOD PRESSURE: 146 MMHG | BODY MASS INDEX: 34.79 KG/M2 | OXYGEN SATURATION: 92 % | HEIGHT: 70 IN | HEART RATE: 60 BPM

## 2019-12-09 DIAGNOSIS — R04.0 EPISTAXIS: Primary | ICD-10-CM

## 2019-12-09 PROCEDURE — 99284 EMERGENCY DEPT VISIT MOD MDM: CPT

## 2019-12-09 PROCEDURE — 6370000000 HC RX 637 (ALT 250 FOR IP): Performed by: EMERGENCY MEDICINE

## 2019-12-09 RX ORDER — OXYMETAZOLINE HYDROCHLORIDE 0.05 G/100ML
2 SPRAY NASAL ONCE
Status: COMPLETED | OUTPATIENT
Start: 2019-12-09 | End: 2019-12-09

## 2019-12-09 RX ADMIN — OXYMETAZOLINE HYDROCHLORIDE 2 SPRAY: 5 SPRAY NASAL at 07:37

## 2019-12-09 ASSESSMENT — ENCOUNTER SYMPTOMS
COUGH: 1
NAUSEA: 0

## 2019-12-17 ENCOUNTER — TELEPHONE (OUTPATIENT)
Dept: PULMONOLOGY | Age: 64
End: 2019-12-17

## 2020-08-07 ENCOUNTER — HOSPITAL ENCOUNTER (OUTPATIENT)
Dept: CT IMAGING | Age: 65
Discharge: HOME | End: 2020-08-07
Payer: MEDICARE

## 2020-08-07 PROCEDURE — 72131 CT LUMBAR SPINE W/O DYE: CPT

## 2020-10-06 ENCOUNTER — APPOINTMENT (OUTPATIENT)
Dept: CT IMAGING | Age: 65
DRG: 871 | End: 2020-10-06
Payer: MEDICARE

## 2020-10-06 ENCOUNTER — APPOINTMENT (OUTPATIENT)
Dept: GENERAL RADIOLOGY | Age: 65
DRG: 871 | End: 2020-10-06
Payer: MEDICARE

## 2020-10-06 ENCOUNTER — HOSPITAL ENCOUNTER (INPATIENT)
Age: 65
LOS: 9 days | Discharge: NURSING, REHAB OR LONG-TERM CARE | DRG: 871 | End: 2020-10-16
Attending: EMERGENCY MEDICINE | Admitting: INTERNAL MEDICINE
Payer: MEDICARE

## 2020-10-06 LAB
A/G RATIO: 1.2 (ref 1.1–2.2)
ALBUMIN SERPL-MCNC: 4.6 G/DL (ref 3.4–5)
ALP BLD-CCNC: 86 U/L (ref 40–129)
ALT SERPL-CCNC: 19 U/L (ref 10–40)
ANION GAP SERPL CALCULATED.3IONS-SCNC: 12 MMOL/L (ref 3–16)
AST SERPL-CCNC: 21 U/L (ref 15–37)
BASE EXCESS VENOUS: 0.9 MMOL/L (ref -2–3)
BASOPHILS ABSOLUTE: 0.1 K/UL (ref 0–0.2)
BASOPHILS RELATIVE PERCENT: 0.5 %
BILIRUB SERPL-MCNC: <0.2 MG/DL (ref 0–1)
BILIRUBIN URINE: NEGATIVE
BLOOD, URINE: ABNORMAL
BUN BLDV-MCNC: 28 MG/DL (ref 7–20)
CALCIUM SERPL-MCNC: 9.8 MG/DL (ref 8.3–10.6)
CARBOXYHEMOGLOBIN: 1.5 % (ref 0–1.5)
CHLORIDE BLD-SCNC: 107 MMOL/L (ref 99–110)
CLARITY: CLEAR
CO2: 25 MMOL/L (ref 21–32)
COLOR: YELLOW
CREAT SERPL-MCNC: 1.7 MG/DL (ref 0.8–1.3)
EOSINOPHILS ABSOLUTE: 0 K/UL (ref 0–0.6)
EOSINOPHILS RELATIVE PERCENT: 0.1 %
GFR AFRICAN AMERICAN: 49
GFR NON-AFRICAN AMERICAN: 41
GLOBULIN: 3.7 G/DL
GLUCOSE BLD-MCNC: 169 MG/DL (ref 70–99)
GLUCOSE URINE: NEGATIVE MG/DL
HCO3 VENOUS: 27.8 MMOL/L (ref 24–28)
HCT VFR BLD CALC: 38 % (ref 40.5–52.5)
HEMOGLOBIN, VEN, REDUCED: 43.4 %
HEMOGLOBIN: 12.2 G/DL (ref 13.5–17.5)
KETONES, URINE: NEGATIVE MG/DL
LACTIC ACID, SEPSIS: 1.7 MMOL/L (ref 0.4–1.9)
LEUKOCYTE ESTERASE, URINE: NEGATIVE
LYMPHOCYTES ABSOLUTE: 1 K/UL (ref 1–5.1)
LYMPHOCYTES RELATIVE PERCENT: 10.1 %
MCH RBC QN AUTO: 29.4 PG (ref 26–34)
MCHC RBC AUTO-ENTMCNC: 32.1 G/DL (ref 31–36)
MCV RBC AUTO: 91.5 FL (ref 80–100)
METHEMOGLOBIN VENOUS: 0.8 % (ref 0–1.5)
MICROSCOPIC EXAMINATION: YES
MONOCYTES ABSOLUTE: 1.2 K/UL (ref 0–1.3)
MONOCYTES RELATIVE PERCENT: 11.1 %
NEUTROPHILS ABSOLUTE: 8.1 K/UL (ref 1.7–7.7)
NEUTROPHILS RELATIVE PERCENT: 78.2 %
NITRITE, URINE: NEGATIVE
O2 SAT, VEN: 56 %
PCO2, VEN: 57.5 MMHG (ref 41–51)
PDW BLD-RTO: 15.1 % (ref 12.4–15.4)
PH UA: 6 (ref 5–8)
PH VENOUS: 7.31 (ref 7.35–7.45)
PLATELET # BLD: 159 K/UL (ref 135–450)
PMV BLD AUTO: 9.2 FL (ref 5–10.5)
PO2, VEN: 34.5 MMHG (ref 25–40)
POTASSIUM REFLEX MAGNESIUM: 4.6 MMOL/L (ref 3.5–5.1)
PRO-BNP: 1115 PG/ML (ref 0–124)
PROTEIN UA: 30 MG/DL
RBC # BLD: 4.15 M/UL (ref 4.2–5.9)
RBC UA: NORMAL /HPF (ref 0–4)
SODIUM BLD-SCNC: 144 MMOL/L (ref 136–145)
SPECIFIC GRAVITY UA: 1.02 (ref 1–1.03)
TCO2 CALC VENOUS: 30 MMOL/L
TOTAL PROTEIN: 8.3 G/DL (ref 6.4–8.2)
URINE REFLEX TO CULTURE: ABNORMAL
URINE TYPE: ABNORMAL
UROBILINOGEN, URINE: 0.2 E.U./DL
WBC # BLD: 10.3 K/UL (ref 4–11)
WBC UA: NORMAL /HPF (ref 0–5)

## 2020-10-06 PROCEDURE — 87483 CNS DNA AMP PROBE TYPE 12-25: CPT

## 2020-10-06 PROCEDURE — 96365 THER/PROPH/DIAG IV INF INIT: CPT

## 2020-10-06 PROCEDURE — 82803 BLOOD GASES ANY COMBINATION: CPT

## 2020-10-06 PROCEDURE — 2580000003 HC RX 258: Performed by: PHYSICIAN ASSISTANT

## 2020-10-06 PROCEDURE — 6360000002 HC RX W HCPCS: Performed by: PHYSICIAN ASSISTANT

## 2020-10-06 PROCEDURE — 99285 EMERGENCY DEPT VISIT HI MDM: CPT

## 2020-10-06 PROCEDURE — 009U3ZX DRAINAGE OF SPINAL CANAL, PERCUTANEOUS APPROACH, DIAGNOSTIC: ICD-10-PCS | Performed by: EMERGENCY MEDICINE

## 2020-10-06 PROCEDURE — 71045 X-RAY EXAM CHEST 1 VIEW: CPT

## 2020-10-06 PROCEDURE — U0003 INFECTIOUS AGENT DETECTION BY NUCLEIC ACID (DNA OR RNA); SEVERE ACUTE RESPIRATORY SYNDROME CORONAVIRUS 2 (SARS-COV-2) (CORONAVIRUS DISEASE [COVID-19]), AMPLIFIED PROBE TECHNIQUE, MAKING USE OF HIGH THROUGHPUT TECHNOLOGIES AS DESCRIBED BY CMS-2020-01-R: HCPCS

## 2020-10-06 PROCEDURE — 36415 COLL VENOUS BLD VENIPUNCTURE: CPT

## 2020-10-06 PROCEDURE — 83880 ASSAY OF NATRIURETIC PEPTIDE: CPT

## 2020-10-06 PROCEDURE — 85025 COMPLETE CBC W/AUTO DIFF WBC: CPT

## 2020-10-06 PROCEDURE — 87040 BLOOD CULTURE FOR BACTERIA: CPT

## 2020-10-06 PROCEDURE — 80053 COMPREHEN METABOLIC PANEL: CPT

## 2020-10-06 PROCEDURE — 83605 ASSAY OF LACTIC ACID: CPT

## 2020-10-06 PROCEDURE — 6370000000 HC RX 637 (ALT 250 FOR IP): Performed by: PHYSICIAN ASSISTANT

## 2020-10-06 PROCEDURE — 93005 ELECTROCARDIOGRAM TRACING: CPT | Performed by: EMERGENCY MEDICINE

## 2020-10-06 PROCEDURE — 81001 URINALYSIS AUTO W/SCOPE: CPT

## 2020-10-06 PROCEDURE — 71250 CT THORAX DX C-: CPT

## 2020-10-06 RX ORDER — POLYETHYLENE GLYCOL 3350 17 G/17G
17 POWDER, FOR SOLUTION ORAL PRN
COMMUNITY

## 2020-10-06 RX ORDER — ACETAMINOPHEN 325 MG/1
650 TABLET ORAL ONCE
Status: COMPLETED | OUTPATIENT
Start: 2020-10-06 | End: 2020-10-06

## 2020-10-06 RX ORDER — SODIUM CHLORIDE, SODIUM LACTATE, POTASSIUM CHLORIDE, AND CALCIUM CHLORIDE .6; .31; .03; .02 G/100ML; G/100ML; G/100ML; G/100ML
30 INJECTION, SOLUTION INTRAVENOUS ONCE
Status: COMPLETED | OUTPATIENT
Start: 2020-10-06 | End: 2020-10-07

## 2020-10-06 RX ORDER — OXYMETAZOLINE HYDROCHLORIDE 0.05 G/100ML
2 SPRAY NASAL PRN
Status: ON HOLD | COMMUNITY
End: 2020-10-30 | Stop reason: CLARIF

## 2020-10-06 RX ORDER — OXCARBAZEPINE 300 MG/1
300 TABLET, FILM COATED ORAL 3 TIMES DAILY
Status: ON HOLD | COMMUNITY
End: 2020-11-05 | Stop reason: HOSPADM

## 2020-10-06 RX ORDER — CHLORPROMAZINE HYDROCHLORIDE 100 MG/1
100 TABLET, FILM COATED ORAL 2 TIMES DAILY
Status: ON HOLD | COMMUNITY
End: 2020-11-05 | Stop reason: HOSPADM

## 2020-10-06 RX ORDER — METHOCARBAMOL 500 MG/1
500 TABLET, FILM COATED ORAL 3 TIMES DAILY
Status: ON HOLD | COMMUNITY
End: 2020-10-16 | Stop reason: SDUPTHER

## 2020-10-06 RX ADMIN — SODIUM CHLORIDE, POTASSIUM CHLORIDE, SODIUM LACTATE AND CALCIUM CHLORIDE 3129 ML: 600; 310; 30; 20 INJECTION, SOLUTION INTRAVENOUS at 23:47

## 2020-10-06 RX ADMIN — VANCOMYCIN HYDROCHLORIDE 2000 MG: 10 INJECTION, POWDER, LYOPHILIZED, FOR SOLUTION INTRAVENOUS at 23:47

## 2020-10-06 RX ADMIN — ACETAMINOPHEN 650 MG: 325 TABLET ORAL at 23:46

## 2020-10-06 ASSESSMENT — PAIN SCALES - GENERAL: PAINLEVEL_OUTOF10: 0

## 2020-10-07 ENCOUNTER — APPOINTMENT (OUTPATIENT)
Dept: CT IMAGING | Age: 65
DRG: 871 | End: 2020-10-07
Payer: MEDICARE

## 2020-10-07 PROBLEM — G93.40 ENCEPHALOPATHY: Status: ACTIVE | Noted: 2020-10-07

## 2020-10-07 PROBLEM — R50.9 FEVER: Status: ACTIVE | Noted: 2020-10-07

## 2020-10-07 PROBLEM — G03.0 ASEPTIC MENINGITIS: Status: ACTIVE | Noted: 2020-10-07

## 2020-10-07 PROBLEM — A41.9 SEPSIS (HCC): Status: ACTIVE | Noted: 2020-10-07

## 2020-10-07 LAB
APPEARANCE CSF: ABNORMAL
CLOT EVALUATION CSF: ABNORMAL
COLOR CSF: COLORLESS
D DIMER: 263 NG/ML DDU (ref 0–229)
EKG ATRIAL RATE: 85 BPM
EKG DIAGNOSIS: NORMAL
EKG P AXIS: 56 DEGREES
EKG P-R INTERVAL: 168 MS
EKG Q-T INTERVAL: 494 MS
EKG QRS DURATION: 234 MS
EKG QTC CALCULATION (BAZETT): 587 MS
EKG R AXIS: 270 DEGREES
EKG T AXIS: 72 DEGREES
EKG VENTRICULAR RATE: 85 BPM
FIBRINOGEN: 370 MG/DL (ref 200–397)
GLUCOSE, CSF: 120 MG/DL (ref 40–80)
LACTIC ACID, SEPSIS: 0.7 MMOL/L (ref 0.4–1.9)
LYMPHS CSF: 86 % (ref 40–80)
MACROPHAGE, CSF: 1 %
MENINGITIS ENCEPHALITIS PANEL: NORMAL
MONOCYTE, CSF: 11 % (ref 15–45)
NEUTROPHILS, CSF: 2 % (ref 0–6)
NUMBER OF CELLS CSF: 100
PROCALCITONIN: 0.88 NG/ML (ref 0–0.15)
PROTEIN CSF: >600 MG/DL (ref 15–45)
RBC CSF: 224 /CUMM
REPORT: NORMAL
TUBE NUMBER CSF: ABNORMAL
TUBE NUMBER CSF: ABNORMAL
WBC CSF: 32 /CUMM (ref 0–5)

## 2020-10-07 PROCEDURE — 84157 ASSAY OF PROTEIN OTHER: CPT

## 2020-10-07 PROCEDURE — 97162 PT EVAL MOD COMPLEX 30 MIN: CPT

## 2020-10-07 PROCEDURE — 83605 ASSAY OF LACTIC ACID: CPT

## 2020-10-07 PROCEDURE — 6360000002 HC RX W HCPCS: Performed by: INTERNAL MEDICINE

## 2020-10-07 PROCEDURE — 84145 PROCALCITONIN (PCT): CPT

## 2020-10-07 PROCEDURE — 6370000000 HC RX 637 (ALT 250 FOR IP): Performed by: INTERNAL MEDICINE

## 2020-10-07 PROCEDURE — 2580000003 HC RX 258: Performed by: INTERNAL MEDICINE

## 2020-10-07 PROCEDURE — 85384 FIBRINOGEN ACTIVITY: CPT

## 2020-10-07 PROCEDURE — 89051 BODY FLUID CELL COUNT: CPT

## 2020-10-07 PROCEDURE — 70450 CT HEAD/BRAIN W/O DYE: CPT

## 2020-10-07 PROCEDURE — 82945 GLUCOSE OTHER FLUID: CPT

## 2020-10-07 PROCEDURE — 85379 FIBRIN DEGRADATION QUANT: CPT

## 2020-10-07 PROCEDURE — 99223 1ST HOSP IP/OBS HIGH 75: CPT | Performed by: INTERNAL MEDICINE

## 2020-10-07 PROCEDURE — 2060000000 HC ICU INTERMEDIATE R&B

## 2020-10-07 PROCEDURE — 97530 THERAPEUTIC ACTIVITIES: CPT

## 2020-10-07 PROCEDURE — 87070 CULTURE OTHR SPECIMN AEROBIC: CPT

## 2020-10-07 PROCEDURE — 96366 THER/PROPH/DIAG IV INF ADDON: CPT

## 2020-10-07 PROCEDURE — 87205 SMEAR GRAM STAIN: CPT

## 2020-10-07 RX ORDER — POTASSIUM CHLORIDE 7.45 MG/ML
10 INJECTION INTRAVENOUS PRN
Status: DISCONTINUED | OUTPATIENT
Start: 2020-10-07 | End: 2020-10-16 | Stop reason: HOSPADM

## 2020-10-07 RX ORDER — MAGNESIUM SULFATE IN WATER 40 MG/ML
2 INJECTION, SOLUTION INTRAVENOUS PRN
Status: DISCONTINUED | OUTPATIENT
Start: 2020-10-07 | End: 2020-10-16 | Stop reason: HOSPADM

## 2020-10-07 RX ORDER — ACETAMINOPHEN 650 MG/1
650 SUPPOSITORY RECTAL EVERY 6 HOURS PRN
Status: DISCONTINUED | OUTPATIENT
Start: 2020-10-07 | End: 2020-10-16 | Stop reason: HOSPADM

## 2020-10-07 RX ORDER — ACETAMINOPHEN 325 MG/1
650 TABLET ORAL EVERY 6 HOURS PRN
Status: DISCONTINUED | OUTPATIENT
Start: 2020-10-07 | End: 2020-10-16 | Stop reason: HOSPADM

## 2020-10-07 RX ORDER — SODIUM CHLORIDE 0.9 % (FLUSH) 0.9 %
10 SYRINGE (ML) INJECTION EVERY 12 HOURS SCHEDULED
Status: DISCONTINUED | OUTPATIENT
Start: 2020-10-07 | End: 2020-10-16 | Stop reason: HOSPADM

## 2020-10-07 RX ORDER — ONDANSETRON 2 MG/ML
4 INJECTION INTRAMUSCULAR; INTRAVENOUS EVERY 6 HOURS PRN
Status: DISCONTINUED | OUTPATIENT
Start: 2020-10-07 | End: 2020-10-08

## 2020-10-07 RX ORDER — SODIUM CHLORIDE 9 MG/ML
1000 INJECTION, SOLUTION INTRAVENOUS ONCE
Status: COMPLETED | OUTPATIENT
Start: 2020-10-07 | End: 2020-10-07

## 2020-10-07 RX ORDER — PROMETHAZINE HYDROCHLORIDE 25 MG/1
12.5 TABLET ORAL EVERY 6 HOURS PRN
Status: DISCONTINUED | OUTPATIENT
Start: 2020-10-07 | End: 2020-10-08

## 2020-10-07 RX ORDER — SODIUM CHLORIDE 9 MG/ML
INJECTION, SOLUTION INTRAVENOUS CONTINUOUS
Status: DISCONTINUED | OUTPATIENT
Start: 2020-10-07 | End: 2020-10-12

## 2020-10-07 RX ORDER — SODIUM CHLORIDE 0.9 % (FLUSH) 0.9 %
10 SYRINGE (ML) INJECTION PRN
Status: DISCONTINUED | OUTPATIENT
Start: 2020-10-07 | End: 2020-10-16 | Stop reason: HOSPADM

## 2020-10-07 RX ADMIN — ENOXAPARIN SODIUM 40 MG: 40 INJECTION SUBCUTANEOUS at 09:58

## 2020-10-07 RX ADMIN — MEROPENEM 2 G: 1 INJECTION, POWDER, FOR SOLUTION INTRAVENOUS at 05:49

## 2020-10-07 RX ADMIN — SODIUM CHLORIDE 1000 ML: 9 INJECTION, SOLUTION INTRAVENOUS at 05:30

## 2020-10-07 RX ADMIN — Medication 10 ML: at 20:32

## 2020-10-07 RX ADMIN — ACETAMINOPHEN 650 MG: 325 TABLET ORAL at 09:58

## 2020-10-07 RX ADMIN — MEROPENEM 2 G: 1 INJECTION, POWDER, FOR SOLUTION INTRAVENOUS at 23:32

## 2020-10-07 RX ADMIN — ACETAMINOPHEN 650 MG: 325 TABLET ORAL at 20:41

## 2020-10-07 RX ADMIN — VANCOMYCIN HYDROCHLORIDE 1250 MG: 10 INJECTION, POWDER, LYOPHILIZED, FOR SOLUTION INTRAVENOUS at 17:49

## 2020-10-07 RX ADMIN — ACYCLOVIR SODIUM 750 MG: 50 INJECTION, SOLUTION INTRAVENOUS at 20:32

## 2020-10-07 RX ADMIN — MEROPENEM 2 G: 1 INJECTION, POWDER, FOR SOLUTION INTRAVENOUS at 14:36

## 2020-10-07 ASSESSMENT — PAIN SCALES - GENERAL
PAINLEVEL_OUTOF10: 0

## 2020-10-07 NOTE — ED PROVIDER NOTES
ED Attending Attestation Note     Date of evaluation: 10/6/2020    This patient was seen by the advance practice provider. I have seen and examined the patient, agree with the workup, evaluation, management and diagnosis. The care plan has been discussed. I have reviewed the ECG and concur with the ANDRÉS's interpretation. My assessment reveals a confused -American male who is essentially minimally responsive and lethargic. Very stiff neck but unclear if this is true meningismus. Febrile of unclear source. Chest x-ray with questionable pneumonia. Abdomen diffusely soft. Urinalysis not remarkable for UTI. No evidence of skin infection on exam.  Lumbar puncture performed by the physician assistant under my direct supervision.      Mariam Austin MD  10/07/20 2540

## 2020-10-07 NOTE — CONSULTS
Clinical Pharmacy Consult Note    Admit date: 10/6/2020    Subjective/Objective:   75 yo male with PMHx that includes  schizophrenia, bipolar, SSS s/p PPM presented to ED confused, lethargic with minimal responsiveness. Patient was also found to be febrile with stiff neck but unclear if meningitis related. And Chest x-ray is also concerning for possible PNA. Pharmacy is consulted to dose Vancomycin per Dr. Lester Velasco    Pertinent Medications:  Meropenem 2 gm every 8 hours - #1  Vancomycin IV; Pharmacy to dose -- day # 1    Recent Labs     10/06/20  2209      K 4.6      CO2 25   BUN 28*   CREATININE 1.7*       Estimated Creatinine Clearance: 52 mL/min (A) (based on SCr of 1.7 mg/dL (H)). Recent Labs     10/06/20  2210   WBC 10.3   HGB 12.2*   HCT 38.0*   MCV 91.5          Height:  5' 10\" (177.8 cm)  Weight: 230 lb (104.3 kg)    Micro: Blood and CSF cxs are pending. Assessment/Plan:  Vancomycin  · Patient received vancomycin 2000 mg IVx1 per ED consult. Renal function not so great, however, will continue therapy with scheduled dosing of vancomycin 1250 mg IV every 18 hours (~14.6 mg/kg of AdjBW of 85.3 kg)  · Clinical pharmacist will follow-up in AM.  · Renal function will be monitored closely and dosing will be adjusted as appropriate. Please call with any questions. Thank you for consulting pharmacy!   Audie Boeck, Rph  10/7/2020 4:31 AM

## 2020-10-07 NOTE — PROGRESS NOTES
4 Eyes Admission Assessment     I agree as the admission nurse that 2 RN's have performed a thorough Head to Toe Skin Assessment on the patient. ALL assessment sites listed below have been assessed on admission. Areas assessed by both nurses:  [x]   Head, Face, and Ears   [x]   Shoulders, Back, and Chest  [x]   Arms, Elbows, and Hands   [x]   Coccyx, Sacrum, and Ischium  [x]   Legs, Feet, and Heels        Does the Patient have Skin Breakdown?   No         Leonard Prevention initiated:  No   Wound Care Orders initiated:  No      [facility] nurse consulted for Pressure Injury (Stage 3,4, Unstageable, DTI, NWPT, and Complex wounds) or Leonard score 18 or lower:  No      Nurse 1 eSignature: Electronically signed by Derrick Lindquist RN on 10/7/20 at 7:41 AM EDT    **SHARE this note so that the co-signing nurse is able to place an eSignature**    Nurse 2 eSignature: Electronically signed by Mariposa Hernández RN on 10/7/20 at 8:00 AM EDT

## 2020-10-07 NOTE — PLAN OF CARE
Problem: Falls - Risk of:  Goal: Will remain free from falls  Description: Will remain free from falls  Outcome: Ongoing     Problem: Falls - Risk of:  Goal: Absence of physical injury  Description: Absence of physical injury  Outcome: Ongoing     Problem: Confusion - Acute:  Goal: Absence of continued neurological deterioration signs and symptoms  Description: Absence of continued neurological deterioration signs and symptoms  Outcome: Ongoing     Problem: Confusion - Acute:  Goal: Mental status will be restored to baseline  Description: Mental status will be restored to baseline  Outcome: Ongoing     Problem: Injury - Risk of, Physical Injury:  Goal: Will remain free from falls  Description: Will remain free from falls  Outcome: Ongoing     Problem: Injury - Risk of, Physical Injury:  Goal: Absence of physical injury  Description: Absence of physical injury  Outcome: Ongoing

## 2020-10-07 NOTE — ED TRIAGE NOTES
From [facility], per EMS report patient is normally alert and interactive ambulates with assist. Today has had increasing lethargy, slow to answer questions, somnolent.   Temp 102.4 congested cough

## 2020-10-07 NOTE — H&P
Hospital Medicine History & Physical      PCP: Los Chowdary MD    Date of Admission: 10/6/2020    Date of Service: Pt seen/examined on 10/07/20 and Admitted to Inpatient with expected LOS greater than two midnights due to medical therapy. Chief Complaint: Altered mental status    History Of Present Illness:      76 y.o. male with past medical history of CAD, hypertension, diabetes mellitus, JONATHAN, paranoid schizophrenia presented with altered mental status from his facility. Patient somnolent. History gathered from chart review and patient. Patient unable to state why he came to the hospital.  States he might have had fever or chills but denies chest pain, shortness of breath, abdominal pain, nausea or vomiting, dysuria. Per chart review, patient was found to be less interactive, lethargic and somnolent at the nursing facility. Patient was transferred to the ED. In the ED, patient was febrile with temperature of 102.5, tachypneic and hypotensive. Patient was found to have very stiff neck on examination. Labs were significant for hemoglobin 12.2, proBNP 1115, creatinine 1.7, blood glucose 169. CT head was negative. Chest x-ray showed left basilar airspace disease and groundglass density in the right lung. EKG showed AV paced rhythm.   CT chest showed severe small airspace opacities in bilateral upper lobe suggestive of atelectasis versus pneumonia    Past Medical History:          Diagnosis Date    Anemia     CAD (coronary artery disease)     Depression     Diabetes mellitus (HCC)     GERD (gastroesophageal reflux disease)     Hypertension     Left anterior fascicular block     JONATHAN (obstructive sleep apnea)     Pacemaker     Paranoid schizophrenia ([facility] [address]     Rash     Right bundle branch block     Sinus bradycardia     Sinusitis        Past Surgical History:          Procedure Laterality Date    PACEMAKER PLACEMENT         Medications Prior to Admission:      Prior to Admission medications    Medication Sig Start Date End Date Taking?  Authorizing Provider   oxymetazoline (AFRIN) 0.05 % nasal spray 2 sprays by Nasal route as needed for Congestion   Yes Historical Provider, MD   chlorproMAZINE (THORAZINE) 100 MG tablet Take 100 mg by mouth 2 times daily   Yes Historical Provider, MD TAPIAORMULARY Lisinopril \hydrochlorothiazide 20-12.5 mgdaily   Yes Historical Provider, MD   methocarbamol (ROBAXIN) 500 MG tablet Take 500 mg by mouth 3 times daily   Yes Historical Provider, MD   polyethylene glycol (MIRALAX) 17 g PACK packet Take 17 g by mouth as needed   Yes Historical Provider, MD MALONEULARY mobic 7.5 daily   Yes Historical Provider, MD   OXcarbazepine (TRILEPTAL) 300 MG tablet Take 300 mg by mouth 3 times daily   Yes Historical Provider, MD   hypromellose 0.4 % SOLN ophthalmic solution Place 2 drops into both eyes every 4 hours as needed   Yes Historical Provider, MD   calcium carbonate (TUMS) 500 MG chewable tablet Take 2 tablets by mouth 2 times daily as needed for Heartburn   Yes Historical Provider, MD   ipratropium-albuterol (DUONEB) 0.5-2.5 (3) MG/3ML SOLN nebulizer solution Inhale 3 mLs into the lungs every 4 hours as needed for Shortness of Breath 8/13/19  Yes Armando Vela MD   ipratropium-albuterol (DUONEB) 0.5-2.5 (3) MG/3ML SOLN nebulizer solution Inhale 3 mLs into the lungs 2 times daily 8/13/19  Yes Armando Vela MD   lisinopril (PRINIVIL;ZESTRIL) 10 MG tablet Take 1 tablet by mouth daily 8/13/19  Yes Armando Vela MD   calcium carbonate (TUMS) 500 MG chewable tablet Take 1 tablet by mouth daily   Yes Historical Provider, MD   fluocinonide (LIDEX) 0.05 % external solution Apply topically daily as needed Apply to scalp   Yes Historical Provider, MD   ketoconazole (NIZORAL) 2 % shampoo Apply topically Twice a Week Apply to scalp every night shift on Tues and Saturday -- massage into scalp and leave on for 5-10 min   Yes Historical Provider, MD   ketoconazole (NIZORAL) 2 % cream Apply topically daily Apply topically to face and ears daily. Yes Historical Provider, MD   dextromethorphan-guaiFENesin (MUCINEX DM)  MG per extended release tablet Take 1 tablet by mouth every 12 hours as needed   Yes Historical Provider, MD   phenylephrine-cocoa butter (PREPARATION H) 0.25-88.44 % Place 1 suppository rectally every 8 hours as needed for Hemorrhoids    Yes Historical Provider, MD   senna (SENOKOT) 8.6 MG tablet Take 2 tablets by mouth every evening   Yes Historical Provider, MD   acetaminophen (TYLENOL) 325 MG tablet Take 650 mg by mouth every 6 hours as needed for Pain   Yes Historical Provider, MD   trolamine salicylate (ASPERCREME) 10 % cream Apply topically 2 times daily as needed for Pain Apply topically to left shoulder as needed. Yes Historical Provider, MD   Omega 3 1000 MG CAPS Take 1 capsule by mouth daily    Yes Historical Provider, MD   hydrocortisone 1 % cream Apply topically daily as needed (itchy skin) Apply to face daily as needed for itchy skin   Yes Historical Provider, MD   carvedilol (COREG) 25 MG tablet Take 25 mg by mouth 2 times daily (with meals). Yes Historical Provider, MD   OLANZapine (ZYPREXA) 20 MG tablet Take 15 mg by mouth 2 times daily    Yes Historical Provider, MD       Allergies:  Cephalosporins; Lorazepam; and Pcn [penicillins]    Social History:      TOBACCO:   reports that he has never smoked. He has never used smokeless tobacco.  ETOH:   reports no history of alcohol use. Family History:      Reviewed in detail and negative for DM, CAD, Cancer, CVA. Problem Relation Age of Onset    No Known Problems Mother     No Known Problems Father        REVIEW OF SYSTEMS:   Pertinent positives as noted in the HPI. All other systems reviewed and negative.     PHYSICAL EXAM PERFORMED:    /71   Pulse 88   Temp 98.8 °F (37.1 °C) (Axillary)   Resp 17   Ht 5' 10\" (1.778 m)   Wt 228 lb 14.4 oz (103.8 kg)   SpO2 99%   BMI 32.84 CONTRAST   Final Result   Several small airspace opacities in bilateral upper lobes may be part of    atelectasis versus early pneumonia. XR CHEST PORTABLE   Final Result   1. Left basilar airspace disease, infrahilar atelectasis. 2. Groundglass density in the right lung is predominantly secondary to poor inspiratory effort and is noted at the overlap of the anterior and posterior ribs. Further clarification with noncontrast computed tomography is suggested. ASSESSMENT:    Active Hospital Problems    Diagnosis Date Noted    Sepsis [facility]) [A41.9] 10/07/2020         PLAN:  Sepsis: Unclear etiology  Might be secondary to pneumonia or COVID or meningitis  Procalcitonin, fibrinogen and d-dimer pending  COVID-19 test pending  Cultures pending  Continue droplet plus isolation  Continue IV antibiotics and IV fluids  ID consulted    Acute metabolic encephalopathy:  Likely secondary to above  Mental status better now, improving  PT OT and speech eval    JOSÉ on CKD 3:  Monitor creatinine  Continue IV fluids    CAD/chronic systolic heart failure/hypertension:  Holding Coreg and lisinopril for now with concern to sepsis/hypotension    Sick sinus syndrome:  Status post pacemaker placement    Diabetes mellitus type 2:  Monitor blood glucose  Speech eval  We will start carb controlled diet and sliding scale insulin if patient passes speech valve    JONATHAN:  Continue nightly BiPAP/oxygen    Paranoid schizophrenia:  Holding home meds for now as patient somnolent and lethargic      DVT Prophylaxis: Lovenox  Diet: Diet NPO, After Midnight Exceptions are: Sips with Meds  Code Status: Full Code    PT/OT Eval Status: Ordered    Dispo -anticipate discharge in more than 48 hours        Doreen Garcia MD    Thank you Sean Casiano MD for the opportunity to be involved in this patient's care. If you have any questions or concerns please feel free to contact me at [phone].

## 2020-10-07 NOTE — FLOWSHEET NOTE
10/07/20 1354   Encounter Summary   Services provided to: Family; Patient not available   Referral/Consult From: [facility]   (10/7jeni )   Length of Encounter 45 minutes   Advance Care Planning Yes   Spiritual/Yazdanism   Type Spiritual support   Assessment Calm; Approachable   Intervention Active listening;Explored feelings, thoughts, concerns   Outcome Expressed gratitude;Engaged in conversation   Advance Directives (For Healthcare)   Healthcare Directive Yes, patient has an advance directive for healthcare treatment   Type of Healthcare Directive Durable power of  for health care   Copy in Chart Yes, copy in chart   Chart Copy Status : Active   Date Reviewed and Current: 10/07/20   [address] Agent's Name   Lily Jose Francisco )   Healthcare Agent's Phone Number   ([phone] (torp) )   If you are unable to speak for yourself, does your Healthcare Agent or Legal Spokesperson know your healthcare wishes?  Yes   Staff Larry Stinson MA

## 2020-10-07 NOTE — CARE COORDINATION
Case Management Assessment           Initial Evaluation                Date / Time of Evaluation: 10/7/2020 12:45 PM                 Assessment Completed by: Dara Baker    Patient Name: Elieser Jolly     YOB: 1955  Diagnosis: Sepsis ([address] [A41.9]  Sepsis [facility]) [A41.9]     Date / Time: 10/6/2020  9:22 PM    Patient Admission Status: Inpatient    If patient is discharged prior to next notation, then this note serves as note for discharge by case management. Current PCP: Marie Iqbal MD  Clinic Patient: No    Chart Reviewed: Yes  Patient/ Family Interviewed: Yes    Initial assessment completed at bedside with: patient's sisterEmily No    Hospitalization in the last 30 days: No    Emergency Contacts:  Extended Emergency Contact Information  Primary Emergency Contact: Raven Chatman  Address: [facility] Phone: [phone]  Work Phone: [phone]  Relation: Brother/Sister    Advance Directives:   Code Status: Full 2021 Gina Keen y: Yes  Agent: Shaji Pulliam  Contact Number: [phone]    Copy present: Yes     In paper Chart: No    Scanned into EMR Yes    Financial  Payor: MEDICARE / Plan: MEDICARE PART A AND B / Product Type: *No Product type* /     Pre-cert required for SNF: No    Pharmacy    [place] [address], [address] [place] [address]  Phone: [phone] Fax: [phone]    [id number] [address] [phone] - F [phone]  [address] [place] [address]  Phone: [phone] Fax: [phone]      Potential assistance Purchasing Medications: Potential Assistance Purchasing Medications: No  Does Patient want to participate in local refill/ meds to beds program?: No    Meds To Beds General Rules:  1. Can ONLY be done Monday- Friday between 8:30am-5pm  2.  Prescription(s) must be in pharmacy by 3pm to be filled same day  3. Copy of patient's insurance/ prescription drug card and patient face sheet must be sent along with the prescription(s)  4. Cost of Rx cannot be added to hospital bill. If financial assistance is needed, please contact unit  or ;  or  CANNOT provide pharmacy voucher for patients co-pays  5. Patients can then  the prescription on their way out of the hospital at discharge, or pharmacy can deliver to the bedside if staff is available. (payment due at time of pick-up or delivery - cash, check, or card accepted)     Able to afford home medications/ co-pay costs: Yes    ADLS  Support Systems: Family Members, ECF/Assisted Living    PT AM-PAC: 18 /24  OT AM-PAC:   /24    [place]: from [employer] ([employer])  Steps: 0    Plans to RETURN to current housing: Yes  Barriers to RETURNING to current housing: none          Durable Medical Equipment  DME Provider: n/a  Equipment: defer    Home Oxygen and [address] prior to admission: No  [address]: Not Applicable  Other Respiratory Equipment: cpap        Dialysis  Active with HD/PD prior to admission: No  Nephrologist:     HD Center:  Not Applicable    DISCHARGE PLAN:  Disposition: [facility] (LTC): [address] [place] [address]  Phone: [phone]  Fax: [phone]    Transportation PLAN for discharge: EMS transportation     Factors facilitating achievement of predicted outcomes: Family support    Barriers to discharge: Long standing deficits    Additional Case Management Notes:  Patient is from LTC at [address] [place] [address], family plans for him to return there at discharge. CM spoke with [facility] who confirmed pt is LTC and can return when medically stable, no pre-cert.       The Plan for Transition of Care is related to the following treatment goals of Sepsis ([address] [A41.9]  Sepsis ([address] [A41.9]    The Patient and/or patient representative Anila Montenegro and his family were provided with a choice of provider and agrees with the discharge plan Yes    Freedom of choice list was provided with basic dialogue that supports the patient's individualized plan of care/goals and shares the quality data associated with the providers.  Yes    Care Transition patient: No    Jay Jane RN  [facility]  Case Management Department  Ph: [phone]   Fax: [phone]

## 2020-10-07 NOTE — PROGRESS NOTES
Clinical Pharmacy Consult Note    Admit date: 10/6/2020    Subjective/Objective:   77 yo male with PMHx that includes schizophrenia, bipolar disorder, SSS s/p PPM presented to ED from SNF confused, lethargic with minimal responsiveness. Patient was also found to be febrile with stiff neck but unclear if meningitis related. And Chest x-ray is also concerning for possible PNA. Further H&P is pending at this time. Pharmacy is consulted to dose Vancomycin per Dr. Romel Phillips    Pertinent Medications:  Meropenem 2g IV q8h -- day #1  Vancomycin IV-- Pharmacy to dose -- day # 1   2g IV x 1 (10/6)   1.25g IV q18h (to start 10/7 @ 1800)    Recent Labs     10/06/20  2209      K 4.6      CO2 25   BUN 28*   CREATININE 1.7*       Estimated Creatinine Clearance: 52 mL/min (A) (based on SCr of 1.7 mg/dL (H)). Recent Labs     10/06/20  2210   WBC 10.3   HGB 12.2*   HCT 38.0*   MCV 91.5          Height:  5' 10\" (177.8 cm)  Weight: 228 lb 14.4 oz (103.8 kg)    Micro:   10/6 Blood x 2:  Pending  10/7 CSF:   No organisms seen     Prophylaxis:  DVT- enoxaparin   GI- Not indicated     Assessment/Plan: PNA vs meningitis -- vancomycin, meropenem -- day #1    Vancomycin- pharmacy to dose  · 1.25g IV q18h ordered to start this evening following 2g dose given in ED on 10/6. Will continue current dose today. No new labs today. CMP ordered for 10/8. · Trough level will be ordered as appropriate. Desired level = 15-20 mcg/mL. · Renal function will be monitored closely and dosing will be adjusted as appropriate. Meropenem   · On 2g IV q8h. High dose is appropriate for CNS indication. · If meningitis is r/o and treating only PNA, 1g IV q8h is adequate. Please call with any questions. Thank you for consulting pharmacy!   Galen Kathleen PharmD, BCPS  10/7/2020 11:59 AM  Wireless: [phone] or [phone]

## 2020-10-07 NOTE — PROGRESS NOTES
This RN called [facility] and spoke with RN directly involved in this pt's care. Home medication rec completed. Admission complete.

## 2020-10-07 NOTE — PROGRESS NOTES
Physical Therapy    Facility/Department: [place] [address] [facility]  Initial Assessment and Treatment    NAME: Rajni Mckenna  : 1955  MRN: [id number]    Date of Service: 10/7/2020    Discharge Recommendations:  Rajni Mckenna scored a 18/24 on the AM-PAC short mobility form. Current research shows that an AM-PAC score of 17 or less is typically not associated with a discharge to the patient's home setting. Based on the patient's AM-PAC score and their current functional mobility deficits, it is recommended that the patient have 3-5 sessions per week of Physical Therapy at d/c to increase the patient's independence. Please see assessment section for further patient specific details. PT Equipment Recommendations  Equipment Needed: No    Assessment   Assessment: Pt from LTC faciltity for sepsis. Pt is independent with ambulation at facility. Pt with increased lethargy today, difficulty keeping eyes open while seated EOB or in stance. Pt perseverating on answers to questions asked. Pt needing commands repeated for tasks. Once pt decided to perform tasks, pt was impulsive. Anticipate good progress as lethargy improves. Pt returned to supine at end of session due to safety. Pt would benefit from PT upon returning to LTC. Treatment Diagnosis: Decreased gait secondary to sepsis. Decision Making: High Complexity  Patient Education: Role of PT. No learning noted due to lethargy. REQUIRES PT FOLLOW UP: Yes         Restrictions  Up as tolerated     Vision/Hearing  Vision: Within Functional Limits  Hearing: Within functional limits       Subjective  Chart Reviewed: Yes  Additional Pertinent Hx: Pt to ED 10/6 with confusion and lethargy. Pt admitted for sepsis. Chest CT: atelectasis versus early pneumonia. Head CT (-). PMH:  HTN, DM, CAD, R BBB, paranoid schizophrenia, pacemaker, JONATHAN, GERD, depression, Anemia  Diagnosis: Sepsis    Subjective  Subjective: Pt found supine in bed.   Pt alert initially, but quickly becoming more lethargic with conversation. Pt with eyes closed most of the time and perseverating on answers to questions. Pain Screening  Patient Currently in Pain: Denies    Orientation  Impaired  Orientation Level: Oriented to person;Oriented to place(oriented to month, but not year. Disoriented to situation.)    Social/Functional History  Type of Home: Facility([facility])  ADL Assistance: Independent  Homemaking Assistance: Needs assistance(per facility staff)  Ambulation Assistance: Independent  Transfer Assistance: Independent  Additional Comments: Pt very lethargic and history obtainted from facility. RN at [facility] reports pt uses a rolling walker, but pt denies. Objective  Strength  Strength RLE: WFL  Strength LLE: WFL    Bed mobility  Rolling to Left: Modified independent(reaching for rail)  Rolling to Right: Modified independent(reaching for rail)  Supine to Sit: Stand by assistance(cues for safety and impulsiveness)  Sit to Supine: Independent(bed flat, pt impulsive returning to supine)     Transfers  Sit to Stand: Contact guard assistance(from EOB to walker, increased cues for safety, pt pulling up from walker, pt impulsive)  Stand to sit: Contact guard assistance(cues for safety, impulsive when sitting down, decreased control)  Bed to Chair: Unable to assess(Not tested due to lethargy)     Ambulation  Device: Rolling Walker  Assistance: Contact guard assistance  Quality of Gait: side stepping  Distance: 3-4 steps towards [facility]    Balance  Sitting - Static: Fair  Sitting - Dynamic: Fair  Standing - Static: Fair  Standing - Dynamic: Fair  Comments: Pt with increased lethargy.   Eyes remained closed with EOB and standing activity    Treatment included gait and transfer training with patient education     Plan   Times per week: 2-5  Current Treatment Recommendations: Transfer Training, Gait Training, Functional Mobility Training, Strengthening    Safety Devices  Type of devices: Left in bed,

## 2020-10-07 NOTE — CONSULTS
Infectious Diseases Inpatient Consult Note    Medical Student note - reviewed and modified, see Attending addendum at bottom    Reason for Consult:   Encephalopathy, Fever  Requesting Physician:   Dwayne Pal MD  Primary Care Physician:  Jeff Gonsalez MD  History Obtained From:   Pt, EPIC    Admit Date: 10/6/2020  Hospital Day: 2    CHIEF COMPLAINT:       Chief Complaint   Patient presents with    Altered Mental Status    Fever    Cough       HISTORY OF PRESENT ILLNESS:    Isaak Shi is a 76 y.o. male w/ a PMH of CAD, DM, HTN, JONATHAN, GERD  Pt lives at Nursing facility. Pt states he does not recall what happened at the nursing facility. 'I just remember being at the hospital suddenly'. When asked, he states he is currently in no discomfort and has no symptoms, 'I feel like I normally do.'   He has not felt feverish, neck pain, chills, SOB, or any other sx since being admitted. 10/6 ED visit - Confused, febrile  male, minimally responsive and lethargic. Stiff neck but unsure true meningismus. ED Vitals: T 102.5 /86 P82  ED Labs: BNP 1115, Lactate 1.7, WBC 10.3   CXR left - basilar airspace disease and groundglass density right lung  CT Chest - severe small airspace opacities bilateral upper lobes, atelectasis vs PNA  Pt admitted to Hospitalist and started on Vancomycin + Meropenem. CSF cultures - pending  COVID-19 - pending  Blood cultures x 2 - pending    Today 10/7  Febrile 101. RR 20. HR 75. /70.  SpO2 98% on 2 L Nasal canula  Labs: CSF study: glucose 120, >600 protein, WBC 32,   Procalcitonin 0.88, D-Dimer 263, Lactate 0.7    Past Medical History:    Past Medical History:   Diagnosis Date    Anemia     CAD (coronary artery disease)     Depression     Diabetes mellitus (HCC)     GERD (gastroesophageal reflux disease)     Hypertension     Left anterior fascicular block     JONATHAN (obstructive sleep apnea)     Pacemaker     Paranoid schizophrenia  10/06/2020     Lab Results   Component Value Date    CREATININE 1.7 (H) 10/06/2020    BUN 28 (H) 10/06/2020     10/06/2020    K 4.6 10/06/2020     10/06/2020    CO2 25 10/06/2020       Hepatic Function Panel:   Lab Results   Component Value Date    ALKPHOS 86 10/06/2020    ALT 19 10/06/2020    AST 21 10/06/2020    PROT 8.3 10/06/2020    PROT 7.1 01/01/2010    BILITOT <0.2 10/06/2020    BILIDIR <0.2 08/08/2019    IBILI see below 08/08/2019    LABALBU 4.6 10/06/2020       Micro:  CSF cultures - pending  COVID-19 - pending  Blood cultures x 2 - pending    Imaging:   10/6 PCXR    Impression    1. Left basilar airspace disease, infrahilar atelectasis. 2. Groundglass density in the right lung is predominantly secondary to poor inspiratory effort and is noted at the overlap of the anterior and posterior ribs. Further clarification with noncontrast computed tomography is suggested. 10/7 CT Chest WO contrast  Impression    Several small airspace opacities in bilateral upper lobes may be part of    atelectasis versus early pneumonia. 10/7 CT Head WO contrast  Impression    No acute intracranial findings or substantial change      IMPRESSION:    Patient Active Problem List   Diagnosis    Respiratory failure (HCC)    Hypoxia    JONATHAN (obstructive sleep apnea)    Sepsis ([facility])     RECOMMENDATIONS:    Sepsis - possibly 2/2 PNA / meningitis  - continue vancomycin and meropenem for empiric treatment of PNA / meningitis  - consider respiratory panel order  - procalcitonin elevated, trending  - lactate down from 1.7 to 0.7, trending  - CSF fluid came back abnormal, elevated glucose, protein, RBC, WBC. Pending cultures. Blood cultures pending  CSF cultures pending  COVID-19 pending    Discussed with Dr. Malik Hayes, MS IV     Addendum to Medical Student Consult note:  Pt seen,examined and evaluated. I have independently performed history, physical exam, lab and data review.   I have determined assessment and plan as documented by student Elsa Justice). 77 yo man with mult medical problem, CAD / DM / HTN / JONATHAN  Lives in [facility]    Presents 10/6 with lethargy - 'minimally responsive'  T 102.5, WBC   CSF with WBC 32, , glu 120, prot >120  CXR / Chest CT with airspace d' CT 'atelectasis vs early pneumonia'    Today 10/7, Tm 101. On 2L  Awake, alert, no complaint    BC no growth  Procalcitonin 0.88    IMP/  Mult co-morbidities  Fever   Encephalopathy  Question pneumonia - on RA, no cough    Meningitis, aseptic   - has lymphocytic pleocytosis. - HSV encephalitis in differential yet resolution of lethargy in 1 day would not be suggestive.     REC/  Cont meropenem / vancomycin  Add acyclovir   Add on multiplex CSF PCR assay (Meningitis / Encephalitis panel)  Will review imaging     Discussed with pt, RN  Carina Grady MD

## 2020-10-08 LAB
A/G RATIO: 1.2 (ref 1.1–2.2)
ALBUMIN SERPL-MCNC: 3.9 G/DL (ref 3.4–5)
ALP BLD-CCNC: 78 U/L (ref 40–129)
ALT SERPL-CCNC: 23 U/L (ref 10–40)
ANION GAP SERPL CALCULATED.3IONS-SCNC: 8 MMOL/L (ref 3–16)
AST SERPL-CCNC: 27 U/L (ref 15–37)
BASOPHILS ABSOLUTE: 0 K/UL (ref 0–0.2)
BASOPHILS RELATIVE PERCENT: 0.4 %
BILIRUB SERPL-MCNC: <0.2 MG/DL (ref 0–1)
BUN BLDV-MCNC: 17 MG/DL (ref 7–20)
CALCIUM SERPL-MCNC: 9.2 MG/DL (ref 8.3–10.6)
CHLORIDE BLD-SCNC: 107 MMOL/L (ref 99–110)
CO2: 27 MMOL/L (ref 21–32)
CREAT SERPL-MCNC: 1.4 MG/DL (ref 0.8–1.3)
EOSINOPHILS ABSOLUTE: 0 K/UL (ref 0–0.6)
EOSINOPHILS RELATIVE PERCENT: 0.4 %
GFR AFRICAN AMERICAN: >60
GFR NON-AFRICAN AMERICAN: 51
GLOBULIN: 3.3 G/DL
GLUCOSE BLD-MCNC: 96 MG/DL (ref 70–99)
HCT VFR BLD CALC: 35.2 % (ref 40.5–52.5)
HEMOGLOBIN: 11.2 G/DL (ref 13.5–17.5)
LYMPHOCYTES ABSOLUTE: 0.8 K/UL (ref 1–5.1)
LYMPHOCYTES RELATIVE PERCENT: 10 %
MCH RBC QN AUTO: 29.7 PG (ref 26–34)
MCHC RBC AUTO-ENTMCNC: 31.9 G/DL (ref 31–36)
MCV RBC AUTO: 93.1 FL (ref 80–100)
MONOCYTES ABSOLUTE: 0.6 K/UL (ref 0–1.3)
MONOCYTES RELATIVE PERCENT: 8.4 %
NEUTROPHILS ABSOLUTE: 6.2 K/UL (ref 1.7–7.7)
NEUTROPHILS RELATIVE PERCENT: 80.8 %
PDW BLD-RTO: 15.6 % (ref 12.4–15.4)
PLATELET # BLD: 131 K/UL (ref 135–450)
PMV BLD AUTO: 9 FL (ref 5–10.5)
POTASSIUM REFLEX MAGNESIUM: 4.5 MMOL/L (ref 3.5–5.1)
RBC # BLD: 3.78 M/UL (ref 4.2–5.9)
SODIUM BLD-SCNC: 142 MMOL/L (ref 136–145)
TOTAL PROTEIN: 7.2 G/DL (ref 6.4–8.2)
WBC # BLD: 7.6 K/UL (ref 4–11)

## 2020-10-08 PROCEDURE — 6370000000 HC RX 637 (ALT 250 FOR IP): Performed by: INTERNAL MEDICINE

## 2020-10-08 PROCEDURE — 99232 SBSQ HOSP IP/OBS MODERATE 35: CPT | Performed by: INTERNAL MEDICINE

## 2020-10-08 PROCEDURE — 2580000003 HC RX 258: Performed by: INTERNAL MEDICINE

## 2020-10-08 PROCEDURE — 6360000002 HC RX W HCPCS: Performed by: INTERNAL MEDICINE

## 2020-10-08 PROCEDURE — 85025 COMPLETE CBC W/AUTO DIFF WBC: CPT

## 2020-10-08 PROCEDURE — 92610 EVALUATE SWALLOWING FUNCTION: CPT

## 2020-10-08 PROCEDURE — 80053 COMPREHEN METABOLIC PANEL: CPT

## 2020-10-08 PROCEDURE — 2060000000 HC ICU INTERMEDIATE R&B

## 2020-10-08 RX ORDER — CHLORPROMAZINE HYDROCHLORIDE 25 MG/1
100 TABLET, FILM COATED ORAL 2 TIMES DAILY
Status: DISCONTINUED | OUTPATIENT
Start: 2020-10-08 | End: 2020-10-13 | Stop reason: CLARIF

## 2020-10-08 RX ORDER — OXCARBAZEPINE 150 MG/1
300 TABLET, FILM COATED ORAL 3 TIMES DAILY
Status: DISCONTINUED | OUTPATIENT
Start: 2020-10-08 | End: 2020-10-16 | Stop reason: HOSPADM

## 2020-10-08 RX ADMIN — MEROPENEM 2 G: 1 INJECTION, POWDER, FOR SOLUTION INTRAVENOUS at 17:27

## 2020-10-08 RX ADMIN — ACETAMINOPHEN 650 MG: 325 TABLET ORAL at 20:20

## 2020-10-08 RX ADMIN — ENOXAPARIN SODIUM 40 MG: 40 INJECTION SUBCUTANEOUS at 10:52

## 2020-10-08 RX ADMIN — VANCOMYCIN HYDROCHLORIDE 1250 MG: 10 INJECTION, POWDER, LYOPHILIZED, FOR SOLUTION INTRAVENOUS at 15:34

## 2020-10-08 RX ADMIN — Medication 10 ML: at 20:52

## 2020-10-08 RX ADMIN — CHLORPROMAZINE HYDROCHLORIDE 100 MG: 25 TABLET, SUGAR COATED ORAL at 20:21

## 2020-10-08 RX ADMIN — OXCARBAZEPINE 300 MG: 150 TABLET, FILM COATED ORAL at 15:32

## 2020-10-08 RX ADMIN — MEROPENEM 2 G: 1 INJECTION, POWDER, FOR SOLUTION INTRAVENOUS at 08:01

## 2020-10-08 RX ADMIN — CHLORPROMAZINE HYDROCHLORIDE 100 MG: 25 TABLET, SUGAR COATED ORAL at 15:32

## 2020-10-08 RX ADMIN — SODIUM CHLORIDE: 9 INJECTION, SOLUTION INTRAVENOUS at 03:03

## 2020-10-08 RX ADMIN — ACETAMINOPHEN 650 MG: 325 TABLET ORAL at 02:58

## 2020-10-08 RX ADMIN — OXCARBAZEPINE 300 MG: 150 TABLET, FILM COATED ORAL at 20:20

## 2020-10-08 RX ADMIN — ACYCLOVIR SODIUM 750 MG: 50 INJECTION, SOLUTION INTRAVENOUS at 10:52

## 2020-10-08 ASSESSMENT — PAIN SCALES - GENERAL
PAINLEVEL_OUTOF10: 0

## 2020-10-08 NOTE — PROGRESS NOTES
Clinical Pharmacy Consult Note    Admit date: 10/6/2020    Interval update:  Febrile to 102.7 overnight. Evaluated by ID who recommended addition of acyclovir and addition of CSF PCR assay (meningitis/encephalitis panel). Remains on vancomycin IV and meropenem. Subjective/Objective:   75 yo male with PMHx that includes schizophrenia, bipolar disorder, SSS s/p PPM, CKD3 (baseline SCr appears 1.2-1.4 per chart review) presented to ED from SNF confused, lethargic with minimal responsiveness. Patient was also found to be febrile with stiff neck but unclear if meningitis related. And Chest x-ray is also concerning for possible PNA. Further H&P is pending at this time. Pharmacy is consulted to dose Vancomycin per Dr. Lester Velasco    Pertinent Medications:  Acyclovir 750 mg IV q12h -- day #2  Meropenem 2g IV q8h -- day #2  Vancomycin IV-- Pharmacy to dose -- day #2   2g IV x 1 (10/6)   1.25g IV q18h (10/7-current)    Recent Labs     10/06/20  2209 10/08/20  0620    142   K 4.6 4.5    107   CO2 25 27   BUN 28* 17   CREATININE 1.7* 1.4*       Estimated Creatinine Clearance: 63 mL/min (A) (based on SCr of 1.4 mg/dL (H)). Recent Labs     10/06/20  2210 10/08/20  0620   WBC 10.3 7.6   HGB 12.2* 11.2*   HCT 38.0* 35.2*   MCV 91.5 93.1    131*       Height:  5' 10\" (177.8 cm)  Weight: 230 lb 12.8 oz (104.7 kg)    Micro:   10/6 Blood x 2:   NGTD  10/7 CSF:    NGTD  10/7 Meningitis/encephalitis panel: Not detected     Prophylaxis:  DVT- enoxaparin   GI- Not indicated     Assessment/Plan: PNA vs meningitis -- vancomycin, meropenem, acyclovir -- day #2   Vancomycin- pharmacy to dose  · On 1.25g IV q18h. Will continue current dose today. SCr 1.7 >> 1.4 today- appears near baseline. · Trough level ordered 10/9 @ 0530 prior to 4th total dose. Desired level = 15-20 mcg/mL. · Renal function will be monitored closely and dosing will be adjusted as appropriate. Meropenem   · On 2g IV q8h.   High dose is appropriate for CNS indication. · If meningitis is r/o and treating only PNA, 1g IV q8h is adequate. Acyclovir  · Est CrCl = 63 mL/min   · Will increase dose to 750 mg IV q8h, per protocol   · Will continue to monitor and adjust as appropriate, per renal dose protocol     Please call with any questions. Thank you for consulting pharmacy!   Emily Gray, PharmD, BCPS  10/8/2020 8:24 AM  Wireless: [phone] or [phone]

## 2020-10-08 NOTE — PROGRESS NOTES
Speech Language Pathology  Facility/Department: [place] [address] [facility]   CLINICAL BEDSIDE SWALLOW EVALUATION    NAME: Marylene Bue  : 1955  MRN: [id number]    ADMISSION DATE: 10/6/2020  ADMITTING DIAGNOSIS: has Respiratory failure ([address] Hypoxia; JONATHAN (obstructive sleep apnea); Sepsis ([address] Fever; Encephalopathy; and Aseptic meningitis on their problem list.  ONSET DATE:  10/8/20    Recent Chest Xray/CT of Chest: (10/6/20)  Impression    1. Left basilar airspace disease, infrahilar atelectasis. 2. Groundglass density in the right lung is predominantly secondary to poor inspiratory effort and is noted at the overlap of the anterior and posterior ribs. Further clarification with noncontrast computed tomography is suggested.           Date of Eval: 10/8/2020  Evaluating Therapist: Shobha Iqbal    Current Diet level:  Current Diet : NPO  Current Liquid Diet : NPO    Primary Complaint  Patient Complaint: none reported    Pain:  Pain Assessment  Pain Assessment: 0-10  Pain Level: 0    Reason for Referral  Marylene Bue was referred for a bedside swallow evaluation to assess the efficiency of his swallow function, identify signs and symptoms of aspiration and make recommendations regarding safe dietary consistencies, effective compensatory strategies, and safe eating environment. Impression  Dysphagia Diagnosis: Mild oral stage dysphagia  Dysphagia Impression:   Pt demonstrates mild oral deficits, specifically perseverative munching mastication (especiallly with puree). This is consistent with previous evaluation (2019). Pharyngeal stage appears intact with no clinical s/s aspiration observed with any consistency (including >3 oz thin liquid via straw, uninterrupted). Pt swallow appears stable from previous assessment. Recommend Regular Texture diet with Thin Liquids. No f/u ST indicated at this time.     Dysphagia Outcome Severity Scale: Level 6: Within functional limits/Modified independence Treatment Plan  Requires SLP Intervention: No  Duration/Frequency of Treatment: not indicated    Recommended Diet and Intervention  Diet Solids Recommendation: Regular  Liquid Consistency Recommendation: Thin  Recommended Form of Meds: PO    General Swallowing Strategies  Upright as possible for all oral intake  Small bites/sips  Eat/Feed slowly    Treatment/Goals  N/A    General  Chart Reviewed: Yes  Subjective  Subjective: oriented to person and time; however disoriented to place (even when given a choice)  Behavior/Cognition: Alert; Cooperative(some responses are very delayed)  Temperature Spikes Noted: Yes(per flowsheet)  Respiratory Status: O2 via nasual cannula  Breath Sounds: Diminished(crackles)  O2 Device: Nasal cannula  Liters of Oxygen: 2 L  Communication Observation: Functional  Follows Directions: Simple  Patient Positioning: Upright in bed  Baseline Vocal Quality: Normal  Consistencies Administered: Ice Chips; Thin - cup; Thin - straw;Dysphagia Pureed (Dysphagia I); Reg solid    Prior Dysphagia History:   8/10/19 (BSE):    Very slow with munching pattern, minimal right sided residue with mechanically altered solids. Pts sister reports oral residue and very slow oral phase are baseline. No overt s/s of aspiration, no fluctuations in oxygen saturation. Pt with dysphonia / hoarseness at this time secondary to intubation / self extubation. Pt with x1 coughing following PO at end of session. Given recent self extubation and vocal dysfunction, pt would benefit from instrumental assessment but the patient would likely not tolerate instrumental assessment due to mental illness (sister agrees with this and does not wish to attempt) and suspect body habitus would prevent.  Recommend dental soft / thins at this time with 1:1 assist.     Vision/Hearing  Vision  Vision: Within Functional Limits(for this assessment)  Hearing  Hearing: Within functional limits(for this assessment)    Oral Motor Deficits  Oral/Motor  Oral Motor: Within functional limits(no lower teeth)    Oral Phase Dysfunction  Oral Phase  Oral Phase - Comment: some perseverative/munching mastication with puree     Indicators of Pharyngeal Phase Dysfunction  Pharyngeal Phase  Pharyngeal Phase: WFL  Pharyngeal Phase   Pharyngeal: no clinical s/s aspiration with any consistency trialed (including >3 oz thin liquid via straw, uninterrupted)    Prognosis  Prognosis  Prognosis for safe diet advancement: good  Individuals consulted  Consulted and agree with results and recommendations: Patient;RN    Education  Patient Education:  SLP educated pt re: role of SLP and diet recommendations. Patient Education Response: Verbalizes understanding  Safety Devices in place: Yes  Type of devices: All fall risk precautions in place;Nurse notified       Therapy Time  SLP Individual Minutes  Time In: 1400  Time Out: [id number]  Minutes: 20            Plan  Diet Recommendations:    Diet Solids Recommendation: Regular   Liquid Consistency Recommendation: Thin  Discharge Plan: no further ST indicated at this time  Discussed with RN, Dottie Jackson. Needs within reach. Electronically Signed by:  Earlene Merino., [address] [id number]  Pager #[phone]  This document will serve as a discharge summary if pt discharges before next treatment.    10/8/2020 2:24 PM

## 2020-10-08 NOTE — PROGRESS NOTES
ID Follow-up NOTE  Medical Student note - reviewed and modified, see Attending addendum at bottom    CC:   Encephalopathy  Antibiotics: Vancomycin + Meropenem    Admit Date: 10/6/2020  Hospital Day: 3    Subjective:     Pt states he feels at baseline, does not feel feverish. No n/v/d/abd pain/CP/SOB. Pt noted to be febrile this morning and slight temp this morning, but he states he 'feels fine.'      Objective:     Patient Vitals for the past 8 hrs:   BP Temp Temp src Pulse Resp SpO2 Weight   10/08/20 0604 (!) 140/87 100 °F (37.8 °C) Oral 76 24 95 % 230 lb 12.8 oz (104.7 kg)   10/08/20 0255 126/73 102.7 °F (39.3 °C) Oral 85 24 94 % --     I/O last 3 completed shifts: In: 2942.5 [P.O.:600; I.V.:1742.5; IV Piggyback:600]  Out: 1000 [Urine:1000]  No intake/output data recorded. EXAM:  GENERAL: No apparent distress.     HEENT: Membranes moist, no oral lesion  NECK:  Supple, no lymphadenopathy  LUNGS: Clear b/l, no rales, no dullness  CARDIAC: RRR, no murmur appreciated  ABD:  + BS, soft / NT  EXT:  No rash, no edema, no lesions  NEURO: No focal neurologic findings  PSYCH: Orientation, sensorium, mood normal  LINES:  Peripheral iv       Data Review:  Lab Results   Component Value Date    WBC 7.6 10/08/2020    HGB 11.2 (L) 10/08/2020    HCT 35.2 (L) 10/08/2020    MCV 93.1 10/08/2020     (L) 10/08/2020     Lab Results   Component Value Date    CREATININE 1.4 (H) 10/08/2020    BUN 17 10/08/2020     10/08/2020    K 4.5 10/08/2020     10/08/2020    CO2 27 10/08/2020       Hepatic Function Panel:   Lab Results   Component Value Date    ALKPHOS 78 10/08/2020    ALT 23 10/08/2020    AST 27 10/08/2020    PROT 7.2 10/08/2020    PROT 7.1 01/01/2010    BILITOT <0.2 10/08/2020    BILIDIR <0.2 08/08/2019    IBILI see below 08/08/2019    LABALBU 3.9 10/08/2020       Micro:  10/7 CSF cultures - no growth  10/7 Blood cultures x 2 - no growth  10/7 CSF Meningitis / Encephalitis Panel - not detected      10/7 COVID-19 - pending    Imaging:   10/6 PCXR    Impression    1. Left basilar airspace disease, infrahilar atelectasis. 2. Groundglass density in the right lung is predominantly secondary to poor inspiratory effort and is noted at the overlap of the anterior and posterior ribs. Further clarification with noncontrast computed tomography is suggested.       10/7 CT Chest WO contrast  Impression    Several small airspace opacities in bilateral upper lobes may be part of    atelectasis versus early pneumonia.       10/7 CT Head WO contrast  Impression    No acute intracranial findings or substantial change        Scheduled Meds:   acyclovir  10 mg/kg (Ideal) Intravenous Q8H    sodium chloride flush  10 mL Intravenous 2 times per day    enoxaparin  40 mg Subcutaneous Daily    meropenem  2 g Intravenous Q8H    vancomycin  15 mg/kg (Adjusted) Intravenous Q18H       Continuous Infusions:   sodium chloride 50 mL/hr at 10/08/20 0303       PRN Meds:  sodium chloride flush, potassium chloride, magnesium sulfate, acetaminophen **OR** acetaminophen, promethazine **OR** ondansetron      Assessment:     Pt is a 76 y.o. m w/ a PMH of CAD / DM / HTN / JONATHAN. Lives ECF. Presented 10/6 w/ lethargy, minimally response  T 102.5, WBC 10.3  CSF with WBC 32, , glu 120, prot >120  CXR / Chest CT with airspace d' CT 'atelectasis vs early pneumonia'    10/7   Since admission, pt has returned to baseline, awake, alert, no complaints. Tm 101.2    Today 10/8  Tm 102.7, WBC 7.6  Pt noted to be febrile this morning (102.7)  and slight temp this morning (100), but he states he 'feels fine' and never felt like he had a fever.     Meningitis/Encephalitis panel - not detected  Blood cultures x2 - no growth  CSF culture - no growth  COVID-19 pending      Plan:     Continue Vanco + Meropenem  Discontinue acyclovir, multiplex CSF PCR - not detected  Pt still with febrile episodes since admission although back to baseline  Continue trending Procalcitonin   Respiratory panel ordered    Discussed with [address] IV     Addendum to Medical Student Progress note:  Pt seen,examined and evaluated. I have independently performed history, physical exam, lab and data review. I have determined assessment and plan as documented by student Janneth Given).    75 yo man with mult medical problem, CAD / DM / HTN / JONATHAN  [facility] in [facility]     Presents 10/6 with lethargy - 'minimally responsive'  T 102.5, WBC 10.3, UA neg  CSF with WBC 32, , glu 120, prot >120  Chest CT with airspace d' CT 'atelectasis vs early pneumonia'     10/7 Tm 101. On 2L  10/8 Tm 102.7, 2L     BC no growth  Procalcitonin 0.88  Meningitis / Encephalitis panel neg     IMP/  Mult co-morbidities  Fever - unclear etiology  Encephalopathy - unclear what baseline is  Question pneumonia - on RA, no cough, mild/minor changes on x-ray and CT     Meningitis, aseptic   - has lymphocytic pleocytosis. - HSV encephalitis in differential yet resolution of lethargy in 1 day would not be suggestive.    - Meningitis / Encephalitis panel neg     REC/  Cont meropenem / vancomycin for now  D/c acyclovir    F/u BC results      Discussed with pt, Hospitalist - Dr Pinky Goldman MD

## 2020-10-08 NOTE — CARE COORDINATION
Case Management Assessment           Daily Note                 Date/ Time of Note: 10/8/2020 10:48 AM         Note completed by: Carla Contreras    Patient Name: Bo Carranza  YOB: 1955    Diagnosis:Sepsis ([facility] [A41.9]  Sepsis ([facility] [A41.9]  Patient Admission Status: Inpatient    Date of Admission:10/6/2020  9:22 PM Length of Stay: 1 GLOS:      Current Plan of Care: IV abx, antiviral, IVF, 2L O2  ________________________________________________________________________________________  PT AM-PAC: 18 / 24 per last evaluation on: 10.7    OT AM-PAC:   / 24 per last evaluation on:     DME Needs for discharge: defer to NH  ________________________________________________________________________________________  Discharge Plan: [address] [place] [address] ([facility]): [address] [place] [address]    Tentative discharge date: TBD    Current barriers to discharge: medical clearance      ________________________________________________________________________________________  Case Management Notes:  Patient is from [address] [place] [address] at [facility], can return to [address] [place] [address] when medically stable, no pre-cert. Mayela Renner and his family were provided with choice of provider; he and his family are in agreement with the discharge plan.     Care Transition Patient: Ingrid Contreras RN  [facility]  Case Management Department  Ph: [phone]  Fax: [phone]

## 2020-10-08 NOTE — PROGRESS NOTES
deformity. Skin: Skin color, texture, turgor normal.  No rashes or lesions. Neurologic:  Alert and oriented x1. Follow commands. grossly non-focal.  Psychiatric:  Awake  Capillary Refill: Brisk,< 3 seconds   Peripheral Pulses: +2 palpable, equal bilaterally       Labs:   Recent Labs     10/06/20  2210 10/08/20  0620   WBC 10.3 7.6   HGB 12.2* 11.2*   HCT 38.0* 35.2*    131*     Recent Labs     10/06/20  2209 10/08/20  0620    142   K 4.6 4.5    107   CO2 25 27   BUN 28* 17   CREATININE 1.7* 1.4*   CALCIUM 9.8 9.2     Recent Labs     10/06/20  2209 10/08/20  0620   AST 21 27   ALT 19 23   BILITOT <0.2 <0.2   ALKPHOS 86 78     No results for input(s): INR in the last 72 hours. No results for input(s): Fatemeh Lowers in the last 72 hours. Urinalysis:      Lab Results   Component Value Date    NITRU Negative 10/06/2020    WBCUA None seen 10/06/2020    BACTERIA Rare 08/08/2019    RBCUA 3-4 10/06/2020    BLOODU TRACE-INTACT 10/06/2020    SPECGRAV 1.025 10/06/2020    GLUCOSEU Negative 10/06/2020    GLUCOSEU NEGATIVE 01/01/2010       Radiology:  CT HEAD WO CONTRAST   Final Result   No acute intracranial findings or substantial change      CT CHEST WO CONTRAST   Final Result   Several small airspace opacities in bilateral upper lobes may be part of    atelectasis versus early pneumonia. XR CHEST PORTABLE   Final Result   1. Left basilar airspace disease, infrahilar atelectasis. 2. Groundglass density in the right lung is predominantly secondary to poor inspiratory effort and is noted at the overlap of the anterior and posterior ribs. Further clarification with noncontrast computed tomography is suggested. Assessment/Plan:    Sepsis: Unclear etiology  Might be secondary to pneumonia or COVID or aseptic meningitis  Procalcitonin elevated  Fibrinogen normal and d-dimer slightly elevated.  CTPA negative for PE  COVID-19 test pending  Cultures pending  Respiratory panel pending  Continue droplet plus isolation  Acyclovir was added yesterday per ID. Meningitis encephalitis panel negative. Acyclovir discontinued today. Continue IV antibiotics and IV fluids  ID following     Acute metabolic encephalopathy:  Likely secondary to above  Mental status improving  PT OT and speech eval     JOSÉ on CKD 3:  Monitor creatinine, improving  Continue IV fluids     CAD/chronic systolic heart failure/hypertension:  Holding Coreg and lisinopril for now with concern to sepsis     Sick sinus syndrome:  Status post pacemaker placement     Diabetes mellitus type 2:  Monitor blood glucose  Speech eval  We will start carb controlled diet and sliding scale insulin if patient passes speech eval. Discussed with RN     JONATHAN:  Continue nightly BiPAP/oxygen     Paranoid schizophrenia:  Resumed oxcarbazepine and chlorpromazine  Holding olanzapine for now as patient still confused at times        DVT Prophylaxis: Lovenox  Diet: Diet NPO, After Midnight Exceptions are: Sips with Meds.   Speech to evaluate  Code Status: Full Code     PT/OT Eval Status: Ordered     Dispo -pending mental status improvement, sepsis/josé resolution and antibiotic determination    Nereyda Diehl MD

## 2020-10-08 NOTE — PLAN OF CARE
Problem: Falls - Risk of:  Goal: Will remain free from falls  Description: Will remain free from falls  Outcome: Ongoing  Note: D)  Patient identified as fall risk. A)  Fall precautions in place and bed alarm in use. Instructed to call prior to getting OOB for assistance. R)  Verbalized understanding. Remains free from injury and fall. Problem: Injury - Risk of, Physical Injury:  Goal: Will remain free from falls  Description: Will remain free from falls  Outcome: Ongoing  Note: D)  Patient identified as fall risk. A)  Fall precautions in place and bed alarm in use. Instructed to call prior to getting OOB for assistance. R)  Verbalized understanding. Remains free from injury and fall. Problem: Confusion - Acute:  Goal: Mental status will be restored to baseline  Description: Mental status will be restored to baseline  Note: Patient is alert, delayed responses. Oriented to person and year, reoriented to time and place. Problem: Skin Integrity:  Goal: Will show no infection signs and symptoms  Description: Will show no infection signs and symptoms  Note: Skin intact. Pericare provided. Turns self in bed. Problem: Respiratory:  Goal: Ability to maintain normal respiratory secretions will improve  Description: Ability to maintain normal respiratory secretions will improve  Note: Lungs diminished.  + SOB on exertion. SpO 95% on 2L/NC. Non-productive cough noted.

## 2020-10-09 LAB
GLUCOSE BLD-MCNC: 108 MG/DL (ref 70–99)
GLUCOSE BLD-MCNC: 226 MG/DL (ref 70–99)
GLUCOSE BLD-MCNC: 394 MG/DL (ref 70–99)
PERFORMED ON: ABNORMAL

## 2020-10-09 PROCEDURE — 6360000002 HC RX W HCPCS: Performed by: INTERNAL MEDICINE

## 2020-10-09 PROCEDURE — 2580000003 HC RX 258: Performed by: INTERNAL MEDICINE

## 2020-10-09 PROCEDURE — 94640 AIRWAY INHALATION TREATMENT: CPT

## 2020-10-09 PROCEDURE — 6370000000 HC RX 637 (ALT 250 FOR IP): Performed by: INTERNAL MEDICINE

## 2020-10-09 PROCEDURE — 2060000000 HC ICU INTERMEDIATE R&B

## 2020-10-09 PROCEDURE — 99231 SBSQ HOSP IP/OBS SF/LOW 25: CPT | Performed by: INTERNAL MEDICINE

## 2020-10-09 PROCEDURE — 2700000000 HC OXYGEN THERAPY PER DAY

## 2020-10-09 RX ORDER — POLYVINYL ALCOHOL 14 MG/ML
2 SOLUTION/ DROPS OPHTHALMIC EVERY 4 HOURS PRN
Status: DISCONTINUED | OUTPATIENT
Start: 2020-10-09 | End: 2020-10-16 | Stop reason: HOSPADM

## 2020-10-09 RX ORDER — INSULIN LISPRO 100 [IU]/ML
0-6 INJECTION, SOLUTION INTRAVENOUS; SUBCUTANEOUS
Status: DISCONTINUED | OUTPATIENT
Start: 2020-10-09 | End: 2020-10-16 | Stop reason: HOSPADM

## 2020-10-09 RX ORDER — NICOTINE POLACRILEX 4 MG
15 LOZENGE BUCCAL PRN
Status: DISCONTINUED | OUTPATIENT
Start: 2020-10-09 | End: 2020-10-16 | Stop reason: HOSPADM

## 2020-10-09 RX ORDER — DEXAMETHASONE 4 MG/1
6 TABLET ORAL DAILY
Status: DISCONTINUED | OUTPATIENT
Start: 2020-10-09 | End: 2020-10-16 | Stop reason: HOSPADM

## 2020-10-09 RX ORDER — DEXTROSE MONOHYDRATE 50 MG/ML
100 INJECTION, SOLUTION INTRAVENOUS PRN
Status: DISCONTINUED | OUTPATIENT
Start: 2020-10-09 | End: 2020-10-16 | Stop reason: HOSPADM

## 2020-10-09 RX ORDER — CARVEDILOL 25 MG/1
25 TABLET ORAL 2 TIMES DAILY WITH MEALS
Status: DISCONTINUED | OUTPATIENT
Start: 2020-10-09 | End: 2020-10-16 | Stop reason: HOSPADM

## 2020-10-09 RX ORDER — INSULIN LISPRO 100 [IU]/ML
0-3 INJECTION, SOLUTION INTRAVENOUS; SUBCUTANEOUS NIGHTLY
Status: DISCONTINUED | OUTPATIENT
Start: 2020-10-09 | End: 2020-10-16 | Stop reason: HOSPADM

## 2020-10-09 RX ORDER — DEXTROSE MONOHYDRATE 25 G/50ML
12.5 INJECTION, SOLUTION INTRAVENOUS PRN
Status: DISCONTINUED | OUTPATIENT
Start: 2020-10-09 | End: 2020-10-16 | Stop reason: HOSPADM

## 2020-10-09 RX ORDER — SENNA PLUS 8.6 MG/1
2 TABLET ORAL EVERY EVENING
Status: DISCONTINUED | OUTPATIENT
Start: 2020-10-09 | End: 2020-10-16 | Stop reason: HOSPADM

## 2020-10-09 RX ORDER — DIAPER,BRIEF,INFANT-TODD,DISP
EACH MISCELLANEOUS DAILY PRN
Status: DISCONTINUED | OUTPATIENT
Start: 2020-10-09 | End: 2020-10-16 | Stop reason: HOSPADM

## 2020-10-09 RX ORDER — METHOCARBAMOL 500 MG/1
500 TABLET, FILM COATED ORAL 3 TIMES DAILY
Status: DISCONTINUED | OUTPATIENT
Start: 2020-10-09 | End: 2020-10-16 | Stop reason: HOSPADM

## 2020-10-09 RX ADMIN — METHOCARBAMOL TABLETS 500 MG: 500 TABLET, COATED ORAL at 20:32

## 2020-10-09 RX ADMIN — DEXAMETHASONE 6 MG: 4 TABLET ORAL at 14:11

## 2020-10-09 RX ADMIN — METHOCARBAMOL TABLETS 500 MG: 500 TABLET, COATED ORAL at 10:52

## 2020-10-09 RX ADMIN — CHLORPROMAZINE HYDROCHLORIDE 100 MG: 25 TABLET, SUGAR COATED ORAL at 10:53

## 2020-10-09 RX ADMIN — INSULIN LISPRO 1 UNITS: 100 INJECTION, SOLUTION INTRAVENOUS; SUBCUTANEOUS at 20:37

## 2020-10-09 RX ADMIN — Medication 10 ML: at 10:52

## 2020-10-09 RX ADMIN — OXCARBAZEPINE 300 MG: 150 TABLET, FILM COATED ORAL at 20:32

## 2020-10-09 RX ADMIN — IPRATROPIUM BROMIDE AND ALBUTEROL 1 PUFF: 20; 100 SPRAY, METERED RESPIRATORY (INHALATION) at 09:13

## 2020-10-09 RX ADMIN — OXCARBAZEPINE 300 MG: 150 TABLET, FILM COATED ORAL at 10:52

## 2020-10-09 RX ADMIN — METHOCARBAMOL TABLETS 500 MG: 500 TABLET, COATED ORAL at 14:11

## 2020-10-09 RX ADMIN — SENNOSIDES 17.2 MG: 8.6 TABLET, FILM COATED ORAL at 16:57

## 2020-10-09 RX ADMIN — Medication 10 ML: at 20:39

## 2020-10-09 RX ADMIN — VANCOMYCIN HYDROCHLORIDE 1250 MG: 10 INJECTION, POWDER, LYOPHILIZED, FOR SOLUTION INTRAVENOUS at 06:30

## 2020-10-09 RX ADMIN — CARVEDILOL 25 MG: 25 TABLET, FILM COATED ORAL at 16:57

## 2020-10-09 RX ADMIN — ENOXAPARIN SODIUM 40 MG: 40 INJECTION SUBCUTANEOUS at 14:11

## 2020-10-09 RX ADMIN — IPRATROPIUM BROMIDE AND ALBUTEROL 1 PUFF: 20; 100 SPRAY, METERED RESPIRATORY (INHALATION) at 21:00

## 2020-10-09 RX ADMIN — MEROPENEM 2 G: 1 INJECTION, POWDER, FOR SOLUTION INTRAVENOUS at 00:32

## 2020-10-09 RX ADMIN — ENOXAPARIN SODIUM 40 MG: 40 INJECTION SUBCUTANEOUS at 20:32

## 2020-10-09 RX ADMIN — INSULIN LISPRO 5 UNITS: 100 INJECTION, SOLUTION INTRAVENOUS; SUBCUTANEOUS at 18:23

## 2020-10-09 RX ADMIN — OXCARBAZEPINE 300 MG: 150 TABLET, FILM COATED ORAL at 14:11

## 2020-10-09 RX ADMIN — CHLORPROMAZINE HYDROCHLORIDE 100 MG: 25 TABLET, SUGAR COATED ORAL at 20:32

## 2020-10-09 RX ADMIN — CARVEDILOL 25 MG: 25 TABLET, FILM COATED ORAL at 10:51

## 2020-10-09 ASSESSMENT — PAIN SCALES - GENERAL
PAINLEVEL_OUTOF10: 0
PAINLEVEL_OUTOF10: 3
PAINLEVEL_OUTOF10: 0
PAINLEVEL_OUTOF10: 0

## 2020-10-09 NOTE — PROGRESS NOTES
Hospitalist Progress Note      PCP: Carmen Desai MD    Date of Admission: 10/6/2020    Chief Complaint: Altered mental status    Hospital Course: Patient admitted for sepsis and acute metabolic encephalopathy sec to COVID. IV abx discontinued. ID following. Subjective: Patient alert and oriented x3. Seems better but still little confused at times. Follows commands. Medications:  Reviewed    Infusion Medications    sodium chloride 50 mL/hr at 10/08/20 0303     Scheduled Medications    OXcarbazepine  300 mg Oral TID    chlorproMAZINE  100 mg Oral BID    albuterol-ipratropium  1 puff Inhalation BID    sodium chloride flush  10 mL Intravenous 2 times per day    enoxaparin  40 mg Subcutaneous Daily    meropenem  2 g Intravenous Q8H    vancomycin  15 mg/kg (Adjusted) Intravenous Q18H     PRN Meds: albuterol-ipratropium, sodium chloride flush, potassium chloride, magnesium sulfate, acetaminophen **OR** acetaminophen      Intake/Output Summary (Last 24 hours) at 10/9/2020 0831  Last data filed at 10/9/2020 0452  Gross per 24 hour   Intake 720 ml   Output --   Net 720 ml       Physical Exam Performed:    BP (!) 146/86   Pulse 80   Temp 99.9 °F (37.7 °C) (Axillary)   Resp 18   Ht 5' 10\" (1.778 m)   Wt 224 lb 12.8 oz (102 kg)   SpO2 95%   BMI 32.26 kg/m²     General appearance:  Appears to be more oriented today. Obese  HEENT:  Normal cephalic, atraumatic without obvious deformity. Pupils equal, round, and reactive to light. Extra ocular muscles intact. Conjunctivae/corneas clear. Neck: Supple, with full range of motion. No jugular venous distention. Trachea midline. Respiratory:  Normal respiratory effort. Clear to auscultation, bilaterally without Rales/Wheezes/Rhonchi. Cardiovascular:  Regular rate and rhythm with normal S1/S2 without murmurs, rubs or gallops. Abdomen: Soft, non-tender, non-distended with normal bowel sounds. Musculoskeletal:  No clubbing, cyanosis or edema bilaterally. Progress Note    Patient: Roxy Martinez MRN: [id number]  SSN: [id number]    YOB: 1978  Age: 43 y.o. Sex: female      Admit Date: 2017    20 Days Post-Op    Procedure:  Procedure(s):  SPECIAL PROCEDURE OUTSIDE OF OR    Subjective:     No acute surgical issues. Pt had some issues with malecot drains falling out yesterday which had to be removed. She developed minimal bleeding from drain removal secondary to anticoagulation which was controlled. No evidence of active bleeding. Objective:     Visit Vitals    /50 (BP 1 Location: Right arm, BP Patient Position: At rest)    Pulse 99    Temp 98.3 °F (36.8 °C)    Resp 18    Ht 5' 4\" (1.626 m)    Wt 339 lb 15.2 oz (154.2 kg)    SpO2 99%    Breastfeeding No    BMI 58.35 kg/m2       Temp (24hrs), Av.4 °F (36.9 °C), Min:98.3 °F (36.8 °C), Max:98.7 °F (37.1 °C)        Physical Exam:    Gen:  NAD  Pulm:  Unlabored  Abd:  S/ND/appropriate TTP  Wound bed:  Midline wound bed with exposed small bowel and EC fistula. Wound with bilious output.       Recent Results (from the past 24 hour(s))   GLUCOSE, POC    Collection Time: 17 12:16 PM   Result Value Ref Range    Glucose (POC) 98 65 - 100 mg/dL    Performed by Nisha LOPEZ ([facility])    GLUCOSE, POC    Collection Time: 17  6:22 PM   Result Value Ref Range    Glucose (POC) 91 65 - 100 mg/dL    Performed by Nisha LOPEZ ([facility])    GLUCOSE, POC    Collection Time: 17 11:24 PM   Result Value Ref Range    Glucose (POC) 162 (H) 65 - 100 mg/dL    Performed by [place], BASIC    Collection Time: 17  5:04 AM   Result Value Ref Range    Sodium 143 136 - 145 mmol/L    Potassium 4.1 3.5 - 5.1 mmol/L    Chloride 112 (H) 97 - 108 mmol/L    CO2 25 21 - 32 mmol/L    Anion gap 6 5 - 15 mmol/L    Glucose 103 (H) 65 - 100 mg/dL    BUN 24 (H) 6 - 20 MG/DL    Creatinine 0.79 0.55 - 1.02 MG/DL    BUN/Creatinine ratio 30 (H) 12 - 20      GFR est AA >60 >60 Full range of motion without deformity. Skin: Skin color, texture, turgor normal.  No rashes or lesions. Neurologic:  Alert and oriented x3. Follow commands. grossly non-focal.  Psychiatric:  Awake  Capillary Refill: Brisk,< 3 seconds   Peripheral Pulses: +2 palpable, equal bilaterally       Labs:   Recent Labs     10/06/20  2210 10/08/20  0620   WBC 10.3 7.6   HGB 12.2* 11.2*   HCT 38.0* 35.2*    131*     Recent Labs     10/06/20  2209 10/08/20  0620    142   K 4.6 4.5    107   CO2 25 27   BUN 28* 17   CREATININE 1.7* 1.4*   CALCIUM 9.8 9.2     Recent Labs     10/06/20  2209 10/08/20  0620   AST 21 27   ALT 19 23   BILITOT <0.2 <0.2   ALKPHOS 86 78     No results for input(s): INR in the last 72 hours. No results for input(s): Ermelinda Belts in the last 72 hours. Urinalysis:      Lab Results   Component Value Date    NITRU Negative 10/06/2020    WBCUA None seen 10/06/2020    BACTERIA Rare 08/08/2019    RBCUA 3-4 10/06/2020    BLOODU TRACE-INTACT 10/06/2020    SPECGRAV 1.025 10/06/2020    GLUCOSEU Negative 10/06/2020    GLUCOSEU NEGATIVE 01/01/2010       Radiology:  CT HEAD WO CONTRAST   Final Result   No acute intracranial findings or substantial change      CT CHEST WO CONTRAST   Final Result   Several small airspace opacities in bilateral upper lobes may be part of    atelectasis versus early pneumonia. XR CHEST PORTABLE   Final Result   1. Left basilar airspace disease, infrahilar atelectasis. 2. Groundglass density in the right lung is predominantly secondary to poor inspiratory effort and is noted at the overlap of the anterior and posterior ribs. Further clarification with noncontrast computed tomography is suggested. Assessment/Plan:    Sepsis secondary to COVID19  Procalcitonin elevated  Cx NGTD  Resp panel pending  CSF suggestive of aseptic lymphocytic meningitis. Meningitis encephalitis panel negative. Acyclovir discontinued.   Fibrinogen ml/min/1.73m2    GFR est non-AA >60 >60 ml/min/1.73m2    Calcium 8.7 8.5 - 10.1 MG/DL   MAGNESIUM    Collection Time: 08/02/17  5:04 AM   Result Value Ref Range    Magnesium 1.9 1.6 - 2.4 mg/dL   PHOSPHORUS    Collection Time: 08/02/17  5:04 AM   Result Value Ref Range    Phosphorus 4.4 2.6 - 4.7 MG/DL   CBC WITH AUTOMATED DIFF    Collection Time: 08/02/17  5:04 AM   Result Value Ref Range    WBC 9.6 3.6 - 11.0 K/uL    RBC 2.67 (L) 3.80 - 5.20 M/uL    HGB 7.6 (L) 11.5 - 16.0 g/dL    HCT 24.6 (L) 35.0 - 47.0 %    MCV 92.1 80.0 - 99.0 FL    MCH 28.5 26.0 - 34.0 PG    MCHC 30.9 30.0 - 36.5 g/dL    RDW 17.1 (H) 11.5 - 14.5 %    PLATELET 242 133 - 363 K/uL    NEUTROPHILS 46 32 - 75 %    LYMPHOCYTES 41 12 - 49 %    MONOCYTES 9 5 - 13 %    EOSINOPHILS 4 0 - 7 %    BASOPHILS 0 0 - 1 %    ABS. NEUTROPHILS 4.4 1.8 - 8.0 K/UL    ABS. LYMPHOCYTES 3.9 (H) 0.8 - 3.5 K/UL    ABS. MONOCYTES 0.9 0.0 - 1.0 K/UL    ABS. EOSINOPHILS 0.4 0.0 - 0.4 K/UL    ABS. 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 The stomach and proximal small bowel loops are distended. There is a loop of  small bowel in the midabdomen which is mildly dilated and does not demonstrate  enhancement in the wall and there is suspicion of pneumatosis and this leads to  a loop of small bowel which demonstrates thickening of the wall and findings are  suspicious for ischemia. 2. There is extensive mesenteric edema and a small amount of ascites in the  pelvis. 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 There is nonocclusive thrombus in the SMA.                Abdominal pain ICD-10-CM: R10.9  ICD-9-CM: 789.00  6/25/2017 Yes              Plan/Recommendations/Medical Decision Making:     - Continue wound care with wound vac to wall suction.  - NPO with sips  - Renew TPN  - Antibiotic per ID  - Pain control    Signed By: Brennen White MD     August 2, 2017 normal and d-dimer slightly elevated. CTPA negative for PE. Lovenox   COVID 19 positive  Continue droplet plus isolation  IV abx discontinued and dexamethasone started per ID     Acute metabolic encephalopathy:  Likely secondary to above  Mental status improving  PT OT and speech eval     JOSÉ on CKD 3:  Monitor creatinine, improving  Continue IV fluids     CAD/chronic systolic heart failure/hypertension:  Holding lisinopril with concern to JOSÉ  Coreg resumed     Sick sinus syndrome:  Status post pacemaker placement     Diabetes mellitus type 2:  Monitor blood glucose  Speech eval  We will start carb controlled diet and sliding scale insulin if patient passes speech eval. Discussed with RN     JONATHAN:  Continue nightly BiPAP/oxygen     Paranoid schizophrenia:  Resumed oxcarbazepine and chlorpromazine  Holding olanzapine for now as patient still confused at times        DVT Prophylaxis: Lovenox  Diet: Diet NPO, After Midnight Exceptions are: Sips with Meds.   Speech to evaluate  Code Status: Full Code     PT/OT Eval Status: Ordered     Dispo -pending mental status improvement, sepsis/josé resolution, ID recs and placement    Doreen Garcia MD

## 2020-10-09 NOTE — PROGRESS NOTES
Vancomycin IV has been discontinued. Pharmacy will sign-off at this time. Please re-consult pharmacy if vancomycin is resumed and pharmacy dosing is desired.      Thanks --   Marjorie Severe, PharmD, BCPS  10/9/2020 3:36 PM  Wireless: [phone] or [phone]

## 2020-10-09 NOTE — CARE COORDINATION
Case Management Assessment           Daily Note                 Date/ Time of Note: 10/9/2020 3:15 PM         Note completed by: Jetty Apley    Patient Name: Home Davenport  YOB: 1955    Diagnosis:Sepsis [facility]) [A41.9]  Sepsis ([facility] [A41.9]  Patient Admission Status: Inpatient    Date of Admission:10/6/2020  9:22 PM Length of Stay: 2 GLOS:      Current Plan of Care: IV steroids, IV abx, 2L O2  ________________________________________________________________________________________  PT AM-PAC: 18 / 24 per last evaluation on: 10.7    OT AM-PAC:   / 24 per last evaluation on:     DME Needs for discharge: defer to LTC  ________________________________________________________________________________________  Discharge Plan: [address] [place] [address] ([facility]): [address] [place] [address]    Tentative discharge date: TBD    Current barriers to discharge: COVID positive    Referrals completed: Not Applicable    Resources/ information provided: Not indicated at this time  ________________________________________________________________________________________  Case Management Notes:  Patient is from [facility] at [address] [place] [address], can return to [facility] when medically stable. Suresh Le in admissions [phone]  r [phone] f [phone]    Kavin Stovall and his family were provided with choice of provider; he and his family are in agreement with the discharge plan.     Care Transition Patient: No Jetty Apley, RN  [facility]  Case Management Department  Ph: [phone]  Fax: [phone]

## 2020-10-09 NOTE — PROGRESS NOTES
RN contacted patient's sister at 1 to update her about her brother and his Plan of Care. RN answered her questions to the best of his ability. His sister was appreciative of the call.

## 2020-10-09 NOTE — PLAN OF CARE
Problem: Falls - Risk of:  Goal: Will remain free from falls  Description: Will remain free from falls  Outcome: Met This Shift   Pt needs alarm on bed because he gets up without calling. Problem: Injury - Risk of, Physical Injury:  Goal: Will remain free from falls  Description: Will remain free from falls  Outcome: Met This Shift     Problem: Confusion - Acute:  Goal: Absence of continued neurological deterioration signs and symptoms  Description: Absence of continued neurological deterioration signs and symptoms  Outcome: Ongoing   Patient more interactive but still confused. Problem: Discharge Planning:  Goal: Ability to perform activities of daily living will improve  Description: Ability to perform activities of daily living will improve  Outcome: Ongoing     Problem: Mood - Altered:  Goal: Mood stable  Description: Mood stable  Outcome: Ongoing     Problem: Sensory Perception - Impaired:  Goal: Demonstrations of improved sensory functioning will increase  Description: Demonstrations of improved sensory functioning will increase  Outcome: Ongoing   Pt incontinent of bowel and bladder. Some attempts to go to the bathroom or use the urinal.  Problem: Sleep Pattern Disturbance:  Goal: Appears well-rested  Description: Appears well-rested  Outcome: Ongoing   Small naps today, but largely stayed awake.

## 2020-10-09 NOTE — PROGRESS NOTES
Clinical Pharmacy Consult Note    Admit date: 10/6/2020    Interval update:  COVID-19 positive. Febrile to 102.8 overnight. Acyclovir d/c after negative meningitis/encephalitis panel result. Remains on 2L O2. Subjective/Objective:   77 yo male with PMHx that includes schizophrenia, bipolar disorder, SSS s/p PPM, CKD3 (baseline SCr appears 1.2-1.4 per chart review) presented to ED from SNF confused, lethargic with minimal responsiveness. Patient was also found to be febrile with stiff neck but unclear if meningitis related. And Chest x-ray is also concerning for possible PNA. Further H&P is pending at this time. Pharmacy is consulted to dose Vancomycin per Dr. Olivier Stephenson    Pertinent Medications:   -- (10/7-10/8)   Meropenem 2g IV q8h -- day #3  Vancomycin IV-- Pharmacy to dose -- day #3   2g IV x 1 (10/6)   1.25g IV q18h (10/7-current)    Recent Labs     10/06/20  2209 10/08/20  0620    142   K 4.6 4.5    107   CO2 25 27   BUN 28* 17   CREATININE 1.7* 1.4*       Estimated Creatinine Clearance: 62 mL/min (A) (based on SCr of 1.4 mg/dL (H)). Recent Labs     10/06/20  2210 10/08/20  0620   WBC 10.3 7.6   HGB 12.2* 11.2*   HCT 38.0* 35.2*   MCV 91.5 93.1    131*       Height:  5' 10\" (177.8 cm)  Weight: 224 lb 12.8 oz (102 kg)    Micro:   10/6 Blood x 2:   NGTD  10/7 CSF:    NGTD  10/7 Meningitis/encephalitis panel: Not detected   10/8 Sputum - diatherix:   Pending    Prophylaxis:  DVT- enoxaparin   GI- Not indicated     Assessment/Plan:   1. PNA vs meningitis -- vancomycin, meropenem -- day #3   Vancomycin- pharmacy to dose  · On 1.25g IV q18h. SCr 1.7 >> 1.4 on 10/9 - appears near baseline. · Labs not yet collected today. Vancomycin trough was ordered for this AM, but not collected prior to dose administration. AM level cancelled. · Will place a repeat trough level on 10/10 @ 0000, prior to 5th total dose. Desired level = 15-20 mcg/mL.    · Renal function will be monitored closely and dosing will be adjusted as appropriate. Meropenem   · On 2g IV q8h. High dose is appropriate for CNS indication. · If bacterial meningitis is r/o and treating only PNA, 1g IV q8h is adequate. 2.  COVID-19 PNA:   · Patient is on 2L via nasal canula. Recommend initiating dexamethasone 6 mg daily x 10 days. · Consider increasing enoxaparin to 40 mg BID for BMI >/= 30 kg/m2. Recommend repeating D-dimer and evaluating appropriateness of higher-intensity anticoagulation (risk/benefit). · Consider Remdesivir - patient meets criteria for use and has multiple conditions that place him at an increased risk for severe illness (obesity, T2DM, CAD/HF). Will defer this decision to ID who is currently following. Please call with any questions. Thank you for consulting pharmacy!   Shalini Gallegos, PharmD, BCPS  10/9/2020 10:00 AM  Wireless: [phone] or [phone]

## 2020-10-09 NOTE — PROGRESS NOTES
Infectious Disease Follow up Notes  Admit Date: 10/6/2020  Hospital Day: 4    Antibiotics :   Stop IV Vancomycin   Stop IV Meropenem     CHIEF COMPLAINT:     Fevers  COVID -19 PNA  Meningitis likely from COVID -19  PPM  Schiozphrenia    Subjective interval History :  76 y.o. man from NH admitted with change in mentation and has on going fevers and LP with CSF consistent with viral process and now COVID -19 positive would go along with this. Wbc NORMAL And fevers trend down.         Past Medical History:    Past Medical History:   Diagnosis Date    Anemia     CAD (coronary artery disease)     Depression     Diabetes mellitus ([facility]     GERD (gastroesophageal reflux disease)     Hypertension     Left anterior fascicular block     JONATHAN (obstructive sleep apnea)     Pacemaker     Paranoid schizophrenia ([facility]     Rash     Right bundle branch block     Sinus bradycardia     Sinusitis        Past Surgical History:    Past Surgical History:   Procedure Laterality Date    PACEMAKER PLACEMENT         Current Medications:    Outpatient Medications Marked as Taking for the 10/6/20 encounter [facility] HOSP[facility] Encounter)   Medication Sig Dispense Refill    oxymetazoline (AFRIN) 0.05 % nasal spray 2 sprays by Nasal route as needed for Congestion      chlorproMAZINE (THORAZINE) 100 MG tablet Take 100 mg by mouth 2 times daily      NONFORMULARY Lisinopril \hydrochlorothiazide 20-12.5 mgdaily      methocarbamol (ROBAXIN) 500 MG tablet Take 500 mg by mouth 3 times daily      polyethylene glycol (MIRALAX) 17 g PACK packet Take 17 g by mouth as needed      NONFORMULARY mobic 7.5 daily      OXcarbazepine (TRILEPTAL) 300 MG tablet Take 300 mg by mouth 3 times daily      hypromellose 0.4 % SOLN ophthalmic solution Place 2 drops into both eyes every 4 hours as needed      calcium carbonate (TUMS) 500 MG chewable tablet Take 2 tablets by mouth 2 times daily as needed for Heartburn      ipratropium-albuterol (DUONEB) 0.5-2.5 (3) MG/3ML SOLN nebulizer solution Inhale 3 mLs into the lungs every 4 hours as needed for Shortness of Breath 360 mL 0    ipratropium-albuterol (DUONEB) 0.5-2.5 (3) MG/3ML SOLN nebulizer solution Inhale 3 mLs into the lungs 2 times daily 360 mL 0    lisinopril (PRINIVIL;ZESTRIL) 10 MG tablet Take 1 tablet by mouth daily 30 tablet 3    calcium carbonate (TUMS) 500 MG chewable tablet Take 1 tablet by mouth daily      fluocinonide (LIDEX) 0.05 % external solution Apply topically daily as needed Apply to scalp      ketoconazole (NIZORAL) 2 % shampoo Apply topically Twice a Week Apply to scalp every night shift on Tues and Saturday -- massage into scalp and leave on for 5-10 min      ketoconazole (NIZORAL) 2 % cream Apply topically daily Apply topically to face and ears daily.  dextromethorphan-guaiFENesin (MUCINEX DM)  MG per extended release tablet Take 1 tablet by mouth every 12 hours as needed      phenylephrine-cocoa butter (PREPARATION H) 0.25-88.44 % Place 1 suppository rectally every 8 hours as needed for Hemorrhoids       senna (SENOKOT) 8.6 MG tablet Take 2 tablets by mouth every evening      acetaminophen (TYLENOL) 325 MG tablet Take 650 mg by mouth every 6 hours as needed for Pain      trolamine salicylate (ASPERCREME) 10 % cream Apply topically 2 times daily as needed for Pain Apply topically to left shoulder as needed.  Omega 3 1000 MG CAPS Take 1 capsule by mouth daily       hydrocortisone 1 % cream Apply topically daily as needed (itchy skin) Apply to face daily as needed for itchy skin      carvedilol (COREG) 25 MG tablet Take 25 mg by mouth 2 times daily (with meals).  OLANZapine (ZYPREXA) 20 MG tablet Take 15 mg by mouth 2 times daily          Allergies:  Cephalosporins; Lorazepam; and Pcn [penicillins]    Immunizations :    There is no immunization history on file for this patient. Social History:    Social History     Tobacco Use    Smoking status: Never Smoker    Smokeless tobacco: Never Used   Substance Use Topics    Alcohol use: No    Drug use: No     Social History     Tobacco Use   Smoking Status Never Smoker   Smokeless Tobacco Never Used      Family History   Problem Relation Age of Onset    No Known Problems Mother     No Known Problems Father         REVIEW OF SYSTEMS:     Constitutional:   Fevers+ , chills, night sweats  Eyes:  negative for blurred vision, eye discharge, visual disturbance   HEENT:  negative for hearing loss, ear drainage,nasal congestion  Respiratory:  negative for cough, shortness of breath or hemoptysis   Cardiovascular:  negative for chest pain, palpitations, syncope  Gastrointestinal:  negative for nausea, vomiting, diarrhea, constipation, abdominal pain  Genitourinary:  negative for frequency, dysuria, urinary incontinence, hematuria  Hematologic/Lymphatic:  negative for easy bruising, bleeding and lymphadenopathy  Allergic/Immunologic:  negative for recurrent infections, angioedema, anaphylaxis   Endocrine:  negative for weight changes, polyuria, polydipsia and polyphagia  Musculoskeletal:  negative for joint  pain, swelling, decreased range of motion  Integumentary: No rashes, skin lesions  Neurological:  negative for headaches, slurred speech, unilateral weakness  Psychiatric: negative for hallucinations,confusion+ ,agitation. PHYSICAL EXAM:      Vitals:  T max  102.8   BP (!) 148/92   Pulse 76   Temp 100.6 °F (38.1 °C) (Axillary)   Resp 18   Ht 5' 10\" (1.778 m)   Wt 224 lb 12.8 oz (102 kg)   SpO2 92%   BMI 32.26 kg/m²   PHYSICAL EXAM:     In-person bedside physical examination deferred.   Pursuant to the emergency declaration under the [address] [place] [address], Critical access hospital waiver authority and the [employer] Act, this clinical encounter was conducted to provide necessary medical care. (Also consistent with new provisions and guidance offered by [facility] on March 18, 2020 in setting of COVID 19 outbreak and in order to preserve personal protective equipment in accordance with the flexibilities announced by CMS on March 30, 2020)   References: https://[facility]. [facility]/Portals/[email or web address]/Resources/COVID-19/3_18%20Telemed%20Guidance%20Updated%20March%2018. pdf?mbo=[id number]                      https://[facility]/Portals/0/Resources/COVID-19/3_18%20Telemed%20Guidance%20Updated%20March%2018. pdf?mpz=[email or web address]                      [email or web address] pdf                              Pulmonary: deferred  Abdomen/GI: deferred  Neuro: deferred  Skin: deferred  Musculoskeletal:  deferred  Genitourinary: Deferred  Psych: deferred  Lymphatic/Immunologic: deferred          Data Review:    CBC:   Lab Results   Component Value Date    WBC 7.6 10/08/2020    HGB 11.2 (L) 10/08/2020    HCT 35.2 (L) 10/08/2020    MCV 93.1 10/08/2020     (L) 10/08/2020     RENAL:   Lab Results   Component Value Date    CREATININE 1.4 (H) 10/08/2020    BUN 17 10/08/2020     10/08/2020    K 4.5 10/08/2020     10/08/2020    CO2 27 10/08/2020     SED RATE: No results found for: SEDRATE  CK:   Lab Results   Component Value Date    CKTOTAL 136 01/01/2010     CRP: No results found for: CRP  Hepatic Function Panel:   Lab Results   Component Value Date    ALKPHOS 78 10/08/2020    ALT 23 10/08/2020    AST 27 10/08/2020    PROT 7.2 10/08/2020    PROT 7.1 01/01/2010    BILITOT <0.2 10/08/2020    BILIDIR <0.2 08/08/2019    IBILI see below 08/08/2019    LABALBU 3.9 10/08/2020     UA:  Lab Results   Component Value Date    COLORU Yellow 10/06/2020    CLARITYU Clear 10/06/2020    GLUCOSEU Negative 10/06/2020    GLUCOSEU NEGATIVE 01/01/2010    BILIRUBINUR Negative 10/06/2020    BILIRUBINUR NEGATIVE 01/01/2010 KETUA Negative 10/06/2020    SPECGRAV 1.025 10/06/2020    BLOODU TRACE-INTACT 10/06/2020    PHUR 6.0 10/06/2020    PROTEINU 30 10/06/2020    UROBILINOGEN 0.2 10/06/2020    NITRU Negative 10/06/2020    LEUKOCYTESUR Negative 10/06/2020    LABMICR YES 10/06/2020    URINETYPE Voided 10/06/2020      Urine Microscopic:   Lab Results   Component Value Date    BACTERIA Rare 08/08/2019    WBCUA None seen 10/06/2020    RBCUA 3-4 10/06/2020    EPIU Rare 12/31/2009     Urine Reflex to Culture:   Lab Results   Component Value Date    URRFLXCULT Not Indicated 10/06/2020       Creat  1.4     Procal  0.88     BNP  1115     D dimer  263 /7/2020  4:10 AM - [facility] Incoming Lab Results From Soft (Epic Adt)     Component  Value  Ref Range & Units  Status  Collected  Lab    Color, CSF  Colorless  Colorless  Final  10/07/2020  2:48 AM  [facility] Lab    Tube Number + CELL CT + DIFF-CSF  Tube 4   Final  10/07/2020  2:48 AM  [facility]    Clot Evaluation CSF  see below   Final  10/07/2020  2:48 AM  [facility]    No Clots Seen    WBC, CSF  32High    0 - 5 /cumm  Final  10/07/2020  2:48 AM  [facility] Lab    RBC, CSF  224Abnormal    0 /cumm  Final  10/07/2020  2:48 AM  [facility] Lab    Neutrophils, CSF  2  0 - 6 %  Final  10/07/2020  2:48 AM  [facility] Lab    Lymphs, CSF  86High    40 - 80 %  Final  10/07/2020  2:48 AM  [facility] Lab    Monocytes, CSF  11Low    15 - 45 %  Final  10/07/2020  2:48 AM  [facility] Lab    Macrophage, CSF  1  %  Final  10/07/2020  2:48 AM  [facility] Lab    Number of Cells, CSF  100   Final  10/07/2020  2:48 AM  [facility]    Testing Performed By     Stephen Marie  Name  Director  Address  Valid Date Range    [facility] LAB  YELENA Florence  [address] [facility] [address]  08/30/17 0811-Present    Narrative       MICRO: cultures reviewed and updated by me   Blood Culture:   Lab Results   Component Value Date    [facility]  10/06/2020     No Growth to date. Any change in status will be called. BLOODCULT2  10/06/2020     No Growth to date. Any change in status will be called. me        Respiratory Infection Panel - [facility] [[id number]]      Order Status: Sent  Specimen: Sputum Expectorated     Culture, CSF [[id number]]   Collected: 10/07/20 0240    Order Status: Completed  Specimen: CSF  Updated: 10/08/20 0423     CSF Culture  No Growth so far. Hold 7 days. Gram Stain Result  No organisms seen WBC's (Mononuclear)    Narrative:      ORDER#: [id number]                          ORDERED BY: MARCIANO HELMS   SOURCE: CSF (Spinal Fluid) CSF             COLLECTED:  10/07/20 02:40   ANTIBIOTICS AT ALTAGRACIA. :                      RECEIVED :  10/07/20 16:18   Performed at:   [address] [facility] [address]   Phone [phone]    Culture, Blood 2 [[id number]]   Collected: 10/06/20 2208    Order Status: Completed  Specimen: Blood  Updated: 10/08/20 0115     Culture, Blood 2  No Growth to date.  Any change in status will be called. Narrative:      ORDER#: [id number]                          ORDERED BY: MARCIANO HELMS   SOURCE: Blood Antecubital-Rig              COLLECTED:  10/06/20 22:08   ANTIBIOTICS AT ALTAGRACIA. :                      RECEIVED :  10/07/20 03:44   If child <=2 yrs old please draw pediatric bottle. ~Blood Culture #2   Performed at:   [facility]   [address], [address] [address]   Phone [phone]    Culture, Blood 1 [[id number]]   Collected: 10/06/20 2210    Order Status: Completed  Specimen: Blood  Updated: 10/07/20 2315     Blood Culture, Routine  No Growth to date.  Any change in status will be called.     Narrative:      ORDER#: [id number]                          ORDERED BY: MARCIANO HELMS   SOURCE: Blood Antecubital-Rig              COLLECTED:  10/06/20 22:10   ANTIBIOTICS AT ALTAGRACIA. :                      RECEIVED :  10/07/20 03:44   If child <=2 yrs old please draw pediatric bottle. ~Blood Culture #1   Performed at:   [facility]   [address] [facility] [address] [place] [address]   Phone [phone]    Meningitis Encephalitis Panel Report [[id number]]   Collected: 10/06/20 0230    Order Status: Completed  Updated: 10/07/20 2243     Report  SEE IMAGE    Narrative:      Referred out by:   [facility]   [address] [facility] [address] [place] [address]   Phone [phone]    Meningitis/Encephalitis Panel, CSF [[id number]]   Collected: 10/07/20 2043    Order Status: Completed  Specimen: CSF  Updated: 10/07/20 2240     Meningitis Encephalitis Panel  --     Meningitis Encephalitis Panel PCR Result: Not Detected   See additional report for complete Meningitis Encephalitis Panel. Narrative:      ORDER#: [id number]                          ORDERED BY: Fatmata Carey   SOURCE: CSF (Spinal Fluid)                 COLLECTED:  10/07/20 20:43   ANTIBIOTICS AT ALTAGRACIA. :                      RECEIVED :  10/07/20 20:43   Performed at:   [facility]   [address] [facility] [address] [place] [address]   Phone [phone]    Gram stain [[id number]]      Order Status: Sent  Specimen: Spinal Fluid     Culture, Fungus [[id number]]      Order Status: Sent  Specimen: Spinal Fluid     10/9/2020  7:32 AM - Aleja Falk Incoming Lab Results From Soft (Epic Adt)           Component  Collected  Lab    SARS-CoV-2, DONELL Abnormal    10/06/2020 11:23 PM  [facility] [address] [place] [address] Lab    DETECTED    Testing Performed By     Lab - Abbreviation  Name  Director  Address  Valid Date Range    19- - [facility] [address] LAB  Tato Calixto M.D.  [address] [facility]. [facility] [address]  09/26/20 0000-Present    Narrative   Performed by: [address] [facility] Lab   CALL  Price  [id number] tel. [phone],   results called to [facility], 10/09/2020 07:32, by SHIRLEY   Referred out by:   [facility]   [address] [place] [address]   Phone [phone]         Respiratory Culture:  Lab Results   Component Value Date    LABGRAM No organisms seen WBC's (Mononuclear) 10/07/2020     AFB:No results found for: [facility]  Viral Culture:  Lab Results   Component Value Date    COVID19 DETECTED 10/06/2020     Urine Culture: No results for input(s): Christa Nipper in the last 72 hours. IMAGING:    CT HEAD WO CONTRAST   Final Result   No acute intracranial findings or substantial change      CT CHEST WO CONTRAST   Final Result   Several small airspace opacities in bilateral upper lobes may be part of    atelectasis versus early pneumonia. XR CHEST PORTABLE   Final Result   1. Left basilar airspace disease, infrahilar atelectasis. 2. Groundglass density in the right lung is predominantly secondary to poor inspiratory effort and is noted at the overlap of the anterior and posterior ribs. Further clarification with noncontrast computed tomography is suggested.                   All the pertinent images and reports for the current Hospitalization were reviewed by me     Scheduled Meds:   carvedilol  25 mg Oral BID WC    methocarbamol  500 mg Oral TID    senna  2 tablet Oral QPM    meropenem  1 g Intravenous Q8H    enoxaparin  40 mg Subcutaneous BID    OXcarbazepine  300 mg Oral TID    chlorproMAZINE  100 mg Oral BID    albuterol-ipratropium  1 puff Inhalation BID    sodium chloride flush  10 mL Intravenous 2 times per day    vancomycin  15 mg/kg (Adjusted) Intravenous Q18H       Continuous Infusions:   sodium chloride 50 mL/hr at 10/08/20 0303       PRN Meds:  dextromethorphan-guaiFENesin, hydrocortisone, polyvinyl alcohol, albuterol-ipratropium, sodium chloride flush, potassium chloride, magnesium sulfate, acetaminophen **OR** acetaminophen      Assessment:

## 2020-10-10 PROBLEM — U07.1 PNEUMONIA DUE TO 2019-NCOV: Status: ACTIVE | Noted: 2020-10-10

## 2020-10-10 PROBLEM — J12.82 PNEUMONIA DUE TO 2019-NCOV: Status: ACTIVE | Noted: 2020-10-10

## 2020-10-10 LAB
A/G RATIO: 0.9 (ref 1.1–2.2)
ALBUMIN SERPL-MCNC: 3.4 G/DL (ref 3.4–5)
ALP BLD-CCNC: 63 U/L (ref 40–129)
ALT SERPL-CCNC: 39 U/L (ref 10–40)
ANION GAP SERPL CALCULATED.3IONS-SCNC: 8 MMOL/L (ref 3–16)
AST SERPL-CCNC: 41 U/L (ref 15–37)
BASOPHILS ABSOLUTE: 0 K/UL (ref 0–0.2)
BASOPHILS RELATIVE PERCENT: 0.2 %
BILIRUB SERPL-MCNC: <0.2 MG/DL (ref 0–1)
BLOOD CULTURE, ROUTINE: NORMAL
BUN BLDV-MCNC: 19 MG/DL (ref 7–20)
CALCIUM SERPL-MCNC: 9.4 MG/DL (ref 8.3–10.6)
CHLORIDE BLD-SCNC: 109 MMOL/L (ref 99–110)
CO2: 26 MMOL/L (ref 21–32)
CREAT SERPL-MCNC: 1.3 MG/DL (ref 0.8–1.3)
CSF CULTURE: ABNORMAL
EOSINOPHILS ABSOLUTE: 0 K/UL (ref 0–0.6)
EOSINOPHILS RELATIVE PERCENT: 0 %
GFR AFRICAN AMERICAN: >60
GFR NON-AFRICAN AMERICAN: 55
GLOBULIN: 3.6 G/DL
GLUCOSE BLD-MCNC: 156 MG/DL (ref 70–99)
GLUCOSE BLD-MCNC: 190 MG/DL (ref 70–99)
GLUCOSE BLD-MCNC: 199 MG/DL (ref 70–99)
GLUCOSE BLD-MCNC: 205 MG/DL (ref 70–99)
GLUCOSE BLD-MCNC: 211 MG/DL (ref 70–99)
GRAM STAIN RESULT: ABNORMAL
HCT VFR BLD CALC: 34.3 % (ref 40.5–52.5)
HEMOGLOBIN: 10.8 G/DL (ref 13.5–17.5)
LYMPHOCYTES ABSOLUTE: 0.4 K/UL (ref 1–5.1)
LYMPHOCYTES RELATIVE PERCENT: 10.6 %
MCH RBC QN AUTO: 29.3 PG (ref 26–34)
MCHC RBC AUTO-ENTMCNC: 31.4 G/DL (ref 31–36)
MCV RBC AUTO: 93.5 FL (ref 80–100)
MONOCYTES ABSOLUTE: 0.3 K/UL (ref 0–1.3)
MONOCYTES RELATIVE PERCENT: 7.4 %
NEUTROPHILS ABSOLUTE: 3.3 K/UL (ref 1.7–7.7)
NEUTROPHILS RELATIVE PERCENT: 81.8 %
ORGANISM: ABNORMAL
PDW BLD-RTO: 15.2 % (ref 12.4–15.4)
PERFORMED ON: ABNORMAL
PLATELET # BLD: 134 K/UL (ref 135–450)
PMV BLD AUTO: 9.4 FL (ref 5–10.5)
POTASSIUM REFLEX MAGNESIUM: 4.6 MMOL/L (ref 3.5–5.1)
RBC # BLD: 3.67 M/UL (ref 4.2–5.9)
SARS-COV-2, NAA: DETECTED
SODIUM BLD-SCNC: 143 MMOL/L (ref 136–145)
TOTAL PROTEIN: 7 G/DL (ref 6.4–8.2)
WBC # BLD: 4.1 K/UL (ref 4–11)

## 2020-10-10 PROCEDURE — 2060000000 HC ICU INTERMEDIATE R&B

## 2020-10-10 PROCEDURE — 94640 AIRWAY INHALATION TREATMENT: CPT

## 2020-10-10 PROCEDURE — 2500000003 HC RX 250 WO HCPCS: Performed by: INTERNAL MEDICINE

## 2020-10-10 PROCEDURE — 2580000003 HC RX 258: Performed by: INTERNAL MEDICINE

## 2020-10-10 PROCEDURE — 85025 COMPLETE CBC W/AUTO DIFF WBC: CPT

## 2020-10-10 PROCEDURE — 6360000002 HC RX W HCPCS: Performed by: INTERNAL MEDICINE

## 2020-10-10 PROCEDURE — 6370000000 HC RX 637 (ALT 250 FOR IP): Performed by: INTERNAL MEDICINE

## 2020-10-10 PROCEDURE — 80053 COMPREHEN METABOLIC PANEL: CPT

## 2020-10-10 PROCEDURE — 99223 1ST HOSP IP/OBS HIGH 75: CPT | Performed by: INTERNAL MEDICINE

## 2020-10-10 RX ORDER — 0.9 % SODIUM CHLORIDE 0.9 %
30 INTRAVENOUS SOLUTION INTRAVENOUS PRN
Status: DISCONTINUED | OUTPATIENT
Start: 2020-10-10 | End: 2020-10-16 | Stop reason: HOSPADM

## 2020-10-10 RX ADMIN — OXCARBAZEPINE 300 MG: 150 TABLET, FILM COATED ORAL at 15:27

## 2020-10-10 RX ADMIN — IPRATROPIUM BROMIDE AND ALBUTEROL 1 PUFF: 20; 100 SPRAY, METERED RESPIRATORY (INHALATION) at 08:33

## 2020-10-10 RX ADMIN — SENNOSIDES 17.2 MG: 8.6 TABLET, FILM COATED ORAL at 18:43

## 2020-10-10 RX ADMIN — CHLORPROMAZINE HYDROCHLORIDE 100 MG: 25 TABLET, SUGAR COATED ORAL at 20:13

## 2020-10-10 RX ADMIN — CHLORPROMAZINE HYDROCHLORIDE 100 MG: 25 TABLET, SUGAR COATED ORAL at 09:56

## 2020-10-10 RX ADMIN — METHOCARBAMOL TABLETS 500 MG: 500 TABLET, COATED ORAL at 20:11

## 2020-10-10 RX ADMIN — Medication 10 ML: at 20:16

## 2020-10-10 RX ADMIN — ENOXAPARIN SODIUM 40 MG: 40 INJECTION SUBCUTANEOUS at 09:58

## 2020-10-10 RX ADMIN — OXCARBAZEPINE 300 MG: 150 TABLET, FILM COATED ORAL at 20:11

## 2020-10-10 RX ADMIN — SODIUM CHLORIDE 30 ML: 9 INJECTION, SOLUTION INTRAVENOUS at 18:39

## 2020-10-10 RX ADMIN — METHOCARBAMOL TABLETS 500 MG: 500 TABLET, COATED ORAL at 15:27

## 2020-10-10 RX ADMIN — METHOCARBAMOL TABLETS 500 MG: 500 TABLET, COATED ORAL at 09:55

## 2020-10-10 RX ADMIN — INSULIN LISPRO 1 UNITS: 100 INJECTION, SOLUTION INTRAVENOUS; SUBCUTANEOUS at 18:44

## 2020-10-10 RX ADMIN — CARVEDILOL 25 MG: 25 TABLET, FILM COATED ORAL at 18:43

## 2020-10-10 RX ADMIN — OXCARBAZEPINE 300 MG: 150 TABLET, FILM COATED ORAL at 09:56

## 2020-10-10 RX ADMIN — ENOXAPARIN SODIUM 40 MG: 40 INJECTION SUBCUTANEOUS at 20:06

## 2020-10-10 RX ADMIN — INSULIN LISPRO 1 UNITS: 100 INJECTION, SOLUTION INTRAVENOUS; SUBCUTANEOUS at 09:52

## 2020-10-10 RX ADMIN — INSULIN LISPRO 1 UNITS: 100 INJECTION, SOLUTION INTRAVENOUS; SUBCUTANEOUS at 20:17

## 2020-10-10 RX ADMIN — DEXAMETHASONE 6 MG: 4 TABLET ORAL at 09:54

## 2020-10-10 RX ADMIN — INSULIN LISPRO 1 UNITS: 100 INJECTION, SOLUTION INTRAVENOUS; SUBCUTANEOUS at 12:45

## 2020-10-10 RX ADMIN — IPRATROPIUM BROMIDE AND ALBUTEROL 1 PUFF: 20; 100 SPRAY, METERED RESPIRATORY (INHALATION) at 20:10

## 2020-10-10 RX ADMIN — REMDESIVIR 200 MG: 100 INJECTION, POWDER, LYOPHILIZED, FOR SOLUTION INTRAVENOUS at 20:06

## 2020-10-10 RX ADMIN — Medication 10 ML: at 09:58

## 2020-10-10 RX ADMIN — CARVEDILOL 25 MG: 25 TABLET, FILM COATED ORAL at 09:57

## 2020-10-10 ASSESSMENT — PAIN SCALES - GENERAL
PAINLEVEL_OUTOF10: 0

## 2020-10-10 NOTE — CONSULTS
Clinical Pharmacy Consult Note  Remdesivir Initiation Note    Pharmacy consulted to enter orders for Remdesivir per Dr. Radha Azul. Adolfo Alamo meets criteria for initiation of remdesivir under the emergency use authorization (EUA) based on the following:   Known or suspected COVID-19   Severe disease (SpO2 ? 94% on RA, requiring supplemental O2, or requiring invasive mechanical ventilation)   Acceptable renal function  o CrCl ? 30 ml/min based on SCr obtained prior to initiation OR   o CrCl < 30 ml/min but the potential benefit of remdesivir outweighs the risk   Acceptable hepatic function (ALT within 5 times ULN)    Liver function tests will be monitored daily while on remdesivir. Entered Remdesivir 200 mg IV x1 followed by 100 mg IV q24h x 4 days per Dr. Rahda Azul.     Please call with questions--  Hailey Collins, PharmD, BCPS  Wireless: [phone]  or [phone]  10/10/2020 3:59 PM

## 2020-10-10 NOTE — PROGRESS NOTES
Hospitalist Progress Note      PCP: Ar Rao MD    Date of Admission: 10/6/2020    Chief Complaint: Altered mental status    Hospital Course: Patient admitted for sepsis and acute metabolic encephalopathy sec to COVID. IV abx discontinued. ID following. Subjective: Patient alert and oriented x3. Complains of some dry cough, denies being sob right now. Also denies diarrhea. He is appropriately answering questions but sometimes forgets what he was going to say. Follows commands. Medications:  Reviewed    Infusion Medications    dextrose      sodium chloride Stopped (10/10/20 1528)     Scheduled Medications    carvedilol  25 mg Oral BID WC    methocarbamol  500 mg Oral TID    senna  2 tablet Oral QPM    enoxaparin  40 mg Subcutaneous BID    dexamethasone  6 mg Oral Daily    insulin lispro  0-6 Units Subcutaneous TID WC    insulin lispro  0-3 Units Subcutaneous Nightly    OXcarbazepine  300 mg Oral TID    chlorproMAZINE  100 mg Oral BID    albuterol-ipratropium  1 puff Inhalation BID    sodium chloride flush  10 mL Intravenous 2 times per day     PRN Meds: dextromethorphan-guaiFENesin, hydrocortisone, polyvinyl alcohol, glucose, dextrose, glucagon (rDNA), dextrose, albuterol-ipratropium, sodium chloride flush, potassium chloride, magnesium sulfate, acetaminophen **OR** acetaminophen      Intake/Output Summary (Last 24 hours) at 10/10/2020 1557  Last data filed at 10/10/2020 1522  Gross per 24 hour   Intake 600 ml   Output --   Net 600 ml       Physical Exam Performed:    /85   Pulse 60   Temp 97.4 °F (36.3 °C) (Oral)   Resp 17   Ht 5' 10\" (1.778 m)   Wt 224 lb 12.8 oz (102 kg)   SpO2 92%   BMI 32.26 kg/m²     General appearance:  Appears to be more oriented today. Obese  HEENT:  Normal cephalic, atraumatic without obvious deformity. Pupils equal, round, and reactive to light. Extra ocular muscles intact. Conjunctivae/corneas clear. Neck: Supple, with full range of motion. No jugular venous distention. Trachea midline. Respiratory:  Normal respiratory effort. Clear to auscultation, bilaterally without Rales/Wheezes/Rhonchi. Cardiovascular:  Regular rate and rhythm with normal S1/S2 without murmurs, rubs or gallops. Abdomen: Soft, non-tender, non-distended with normal bowel sounds. Musculoskeletal:  No clubbing, cyanosis or edema bilaterally. Full range of motion without deformity. Skin: Skin color, texture, turgor normal.  No rashes or lesions. Neurologic:  Alert and oriented x3. Follow commands. grossly non-focal.  Psychiatric:  Awake  Capillary Refill: Brisk,< 3 seconds   Peripheral Pulses: +2 palpable, equal bilaterally       Labs:   Recent Labs     10/08/20  0620 10/10/20  0630   WBC 7.6 4.1   HGB 11.2* 10.8*   HCT 35.2* 34.3*   * 134*     Recent Labs     10/08/20  0620 10/10/20  0630    143   K 4.5 4.6    109   CO2 27 26   BUN 17 19   CREATININE 1.4* 1.3   CALCIUM 9.2 9.4     Recent Labs     10/08/20  0620 10/10/20  0630   AST 27 41*   ALT 23 39   BILITOT <0.2 <0.2   ALKPHOS 78 63     No results for input(s): INR in the last 72 hours. No results for input(s): [place] in the last 72 hours. Urinalysis:      Lab Results   Component Value Date    NITRU Negative 10/06/2020    WBCUA None seen 10/06/2020    BACTERIA Rare 08/08/2019    RBCUA 3-4 10/06/2020    BLOODU TRACE-INTACT 10/06/2020    SPECGRAV 1.025 10/06/2020    GLUCOSEU Negative 10/06/2020    GLUCOSEU NEGATIVE 01/01/2010       Radiology:  CT HEAD WO CONTRAST   Final Result   No acute intracranial findings or substantial change      CT CHEST WO CONTRAST   Final Result   Several small airspace opacities in bilateral upper lobes may be part of    atelectasis versus early pneumonia. XR CHEST PORTABLE   Final Result   1. Left basilar airspace disease, infrahilar atelectasis.    2. Groundglass density in the right lung is predominantly secondary to poor inspiratory effort and is noted at the overlap of the anterior and posterior ribs. Further clarification with noncontrast computed tomography is suggested. Assessment/Plan:    Sepsis secondary to COVID19  Procalcitonin elevated  Cx NGTD  Resp panel pending  CSF suggestive of aseptic lymphocytic meningitis. Meningitis encephalitis panel negative. Acyclovir discontinued. Fibrinogen normal and d-dimer slightly elevated. CTPA negative for PE. Lovenox   COVID 19 positive  Continue droplet plus isolation  IV abx discontinued and dexamethasone started per ID  -he has up-trending O2 requirement although on decadron, consulted pulmonology. He has been started on Remdesivir.      Acute metabolic encephalopathy:  Likely secondary to above  Mental status improving  PT OT and speech eval     JOSÉ on CKD 3:  Monitor creatinine, improving  Continue IV fluids     CAD/chronic systolic heart failure/hypertension:  Holding lisinopril with concern to JOSÉ  Coreg resumed     Sick sinus syndrome:  Status post pacemaker placement     Diabetes mellitus type 2:  Monitor blood glucose  Speech eval  On carb controlled diet and sliding scale insulin     JONATHAN:  Continue nightly BiPAP/oxygen     Paranoid schizophrenia:  Resumed oxcarbazepine and chlorpromazine  Holding olanzapine for now as patient still confused at times     DVT Prophylaxis: Lovenox  Diet: Diet NPO, After Midnight Exceptions are: Sips with Meds. Speech to evaluate  Code Status: Full Code     PT/OT Eval Status: Ordered     Dispo -pending mental status improvement, stabilization of O2 requirement, ID and Pulm recs and placement.     Zak Velázquez DO

## 2020-10-10 NOTE — CONSULTS
CC: Pneumonia with hypoxemia    Mr. Nelly Coburn is referred by  [facility] for evaluation of pneumonia with increasing oxygen requirement. Mr. Nelly Coburn is alert and responsive, but a very poor historian. He has very little complaint, including dyspnea. However, many of his answers are simply that he does not know. He was admitted to the hospital on 10/6/2020 after being observed to have altered mental status at the nursing facility where he lives. He was somnolent on admission. He had fever on presentation in the ED, of 102.5. Chest imaging showed small airspace opacities. Treatment was started with IV fluids and broad-spectrum antibiotics, for pneumonia. Since then, testing for COVID-19 has returned positive. Antibiotic therapy has been discontinued. No bacterial cultures have demonstrated pathogens. Past medical history:  1. Coronary artery disease  2. Diabetes mellitus  3. Hypertension  4. Obstructive sleep apnea. 5.  Paranoid schizophrenia  6. Diabetes mellitus    Social history: The patient lives in a nursing facility. It is reported that he has never been a cigarette smoker. He has no history of alcohol use. Family history: Unable to obtain from the patient    ROS: Unable to obtain from the patient    Physical examination: Patient is a well-nourished man who appears his stated age, in no acute distress resting in bed. Pulse 60 and regular. Blood pressure 132/85. Temperature 97.4. Respirations 16. O2Hgb 92%, on O2 at 3 L/min  HEENT: Patient has a mustache and beard. Dentition intact. No oral lesions. Mucous membranes are moist.  PERRLA  Neck: No cervical lymphadenopathy or thyromegaly. Thorax: Normal configuration. Normal respiratory pattern. Breath sound intensity normal bilaterally, with few basilar crackles, L>R  Cardiovascular: Radial pulses 2+ bilaterally. Heart sounds S1, S2 normal.  No murmur appreciated.   Abdomen: Mildly obese, soft, no tenderness, mass, organomegaly. Extremities: No cyanosis or edema. Normal musculature. Neurologic: Patient exhibits normal motor function of upper and lower extremities bilaterally. Normal tactile sensation. Chest CT scan shows several small airspace opacities bilaterally in the upper lung zones. WBC 4.1. Hemoglobin 10.8 g, hematocrit 34%, platelets 442. Procalcitonin 0.88  PCR study for COVID-19 is positive    Assessment and plan: Pneumonia secondary to COVID-19. He has developing disease, with evidence of hypoxemia now that was not present on admission. Clinically, he is doing reasonably well, however. He has been started on systemic steroids. With development of hypoxemia, it is appropriate to also start remdesivir. Continue to monitor clinical progress with addition of antiviral therapy.   Prognosis remains unclear

## 2020-10-10 NOTE — PLAN OF CARE
Problem: Falls - Risk of:  Goal: Will remain free from falls  Description: Will remain free from falls  10/10/2020 0111 by Stoney Early RN  Outcome: Met This Shift     Problem: Falls - Risk of:  Goal: Absence of physical injury  Description: Absence of physical injury  Outcome: Met This Shift     Problem: Injury - Risk of, Physical Injury:  Goal: Will remain free from falls  Description: Will remain free from falls  10/10/2020 0111 by Stoney Early RN  Outcome: Met This Shift     Problem: Injury - Risk of, Physical Injury:  Goal: Absence of physical injury  Description: Absence of physical injury  Outcome: Met This Shift     Problem: Confusion - Acute:  Goal: Absence of continued neurological deterioration signs and symptoms  Description: Absence of continued neurological deterioration signs and symptoms  Outcome: Ongoing     Problem: Confusion - Acute:  Goal: Mental status will be restored to baseline  Description: Mental status will be restored to baseline  10/10/2020 0111 by Stoney Early RN  Outcome: Ongoing     Problem: Discharge Planning:  Goal: Ability to perform activities of daily living will improve  Description: Ability to perform activities of daily living will improve  10/10/2020 0111 by Stoney Early RN  Outcome: Ongoing

## 2020-10-10 NOTE — PROGRESS NOTES
Pt's sister called and given update of patient's status and plan of care. All questions were answered at this time.  Electronically signed by Lilian Miranda RN on 10/10/2020 at 7:56 PM

## 2020-10-11 LAB
A/G RATIO: 0.9 (ref 1.1–2.2)
ALBUMIN SERPL-MCNC: 3.2 G/DL (ref 3.4–5)
ALP BLD-CCNC: 59 U/L (ref 40–129)
ALT SERPL-CCNC: 44 U/L (ref 10–40)
ANION GAP SERPL CALCULATED.3IONS-SCNC: 10 MMOL/L (ref 3–16)
AST SERPL-CCNC: 42 U/L (ref 15–37)
BASOPHILS ABSOLUTE: 0 K/UL (ref 0–0.2)
BASOPHILS RELATIVE PERCENT: 0.2 %
BILIRUB SERPL-MCNC: <0.2 MG/DL (ref 0–1)
BUN BLDV-MCNC: 23 MG/DL (ref 7–20)
CALCIUM SERPL-MCNC: 9.3 MG/DL (ref 8.3–10.6)
CHLORIDE BLD-SCNC: 112 MMOL/L (ref 99–110)
CO2: 23 MMOL/L (ref 21–32)
CREAT SERPL-MCNC: 1.2 MG/DL (ref 0.8–1.3)
CULTURE, BLOOD 2: NORMAL
D DIMER: <200 NG/ML DDU (ref 0–229)
EOSINOPHILS ABSOLUTE: 0 K/UL (ref 0–0.6)
EOSINOPHILS RELATIVE PERCENT: 0 %
GFR AFRICAN AMERICAN: >60
GFR NON-AFRICAN AMERICAN: >60
GLOBULIN: 3.7 G/DL
GLUCOSE BLD-MCNC: 102 MG/DL (ref 70–99)
GLUCOSE BLD-MCNC: 127 MG/DL (ref 70–99)
GLUCOSE BLD-MCNC: 139 MG/DL (ref 70–99)
GLUCOSE BLD-MCNC: 308 MG/DL (ref 70–99)
GLUCOSE BLD-MCNC: 95 MG/DL (ref 70–99)
HCT VFR BLD CALC: 33 % (ref 40.5–52.5)
HEMOGLOBIN: 10.4 G/DL (ref 13.5–17.5)
INR BLD: 0.98 (ref 0.86–1.14)
LYMPHOCYTES ABSOLUTE: 0.6 K/UL (ref 1–5.1)
LYMPHOCYTES RELATIVE PERCENT: 8.1 %
MCH RBC QN AUTO: 29.5 PG (ref 26–34)
MCHC RBC AUTO-ENTMCNC: 31.7 G/DL (ref 31–36)
MCV RBC AUTO: 93.3 FL (ref 80–100)
MONOCYTES ABSOLUTE: 0.6 K/UL (ref 0–1.3)
MONOCYTES RELATIVE PERCENT: 7.8 %
NEUTROPHILS ABSOLUTE: 6.7 K/UL (ref 1.7–7.7)
NEUTROPHILS RELATIVE PERCENT: 83.9 %
PDW BLD-RTO: 15.8 % (ref 12.4–15.4)
PERFORMED ON: ABNORMAL
PERFORMED ON: NORMAL
PLATELET # BLD: 144 K/UL (ref 135–450)
PMV BLD AUTO: 9.3 FL (ref 5–10.5)
POTASSIUM REFLEX MAGNESIUM: 4.5 MMOL/L (ref 3.5–5.1)
PROTHROMBIN TIME: 11.4 SEC (ref 10–13.2)
RBC # BLD: 3.53 M/UL (ref 4.2–5.9)
SODIUM BLD-SCNC: 145 MMOL/L (ref 136–145)
TOTAL PROTEIN: 6.9 G/DL (ref 6.4–8.2)
WBC # BLD: 8 K/UL (ref 4–11)

## 2020-10-11 PROCEDURE — 2500000003 HC RX 250 WO HCPCS: Performed by: INTERNAL MEDICINE

## 2020-10-11 PROCEDURE — 80053 COMPREHEN METABOLIC PANEL: CPT

## 2020-10-11 PROCEDURE — 2060000000 HC ICU INTERMEDIATE R&B

## 2020-10-11 PROCEDURE — 6370000000 HC RX 637 (ALT 250 FOR IP): Performed by: INTERNAL MEDICINE

## 2020-10-11 PROCEDURE — 94761 N-INVAS EAR/PLS OXIMETRY MLT: CPT

## 2020-10-11 PROCEDURE — 85025 COMPLETE CBC W/AUTO DIFF WBC: CPT

## 2020-10-11 PROCEDURE — 2580000003 HC RX 258: Performed by: INTERNAL MEDICINE

## 2020-10-11 PROCEDURE — 85379 FIBRIN DEGRADATION QUANT: CPT

## 2020-10-11 PROCEDURE — 6360000002 HC RX W HCPCS: Performed by: INTERNAL MEDICINE

## 2020-10-11 PROCEDURE — 94640 AIRWAY INHALATION TREATMENT: CPT

## 2020-10-11 PROCEDURE — 2700000000 HC OXYGEN THERAPY PER DAY

## 2020-10-11 PROCEDURE — 85610 PROTHROMBIN TIME: CPT

## 2020-10-11 RX ORDER — PANTOPRAZOLE SODIUM 40 MG/1
40 TABLET, DELAYED RELEASE ORAL DAILY
Status: DISCONTINUED | OUTPATIENT
Start: 2020-10-11 | End: 2020-10-16 | Stop reason: HOSPADM

## 2020-10-11 RX ADMIN — CHLORPROMAZINE HYDROCHLORIDE 100 MG: 25 TABLET, SUGAR COATED ORAL at 09:07

## 2020-10-11 RX ADMIN — DEXAMETHASONE 6 MG: 4 TABLET ORAL at 09:09

## 2020-10-11 RX ADMIN — ACETAMINOPHEN 650 MG: 325 TABLET ORAL at 05:48

## 2020-10-11 RX ADMIN — CHLORPROMAZINE HYDROCHLORIDE 100 MG: 25 TABLET, SUGAR COATED ORAL at 22:35

## 2020-10-11 RX ADMIN — CARVEDILOL 25 MG: 25 TABLET, FILM COATED ORAL at 17:34

## 2020-10-11 RX ADMIN — SODIUM CHLORIDE: 9 INJECTION, SOLUTION INTRAVENOUS at 13:20

## 2020-10-11 RX ADMIN — PANTOPRAZOLE SODIUM 40 MG: 40 TABLET, DELAYED RELEASE ORAL at 17:34

## 2020-10-11 RX ADMIN — SENNOSIDES 17.2 MG: 8.6 TABLET, FILM COATED ORAL at 17:34

## 2020-10-11 RX ADMIN — ENOXAPARIN SODIUM 40 MG: 40 INJECTION SUBCUTANEOUS at 09:10

## 2020-10-11 RX ADMIN — INSULIN LISPRO 2 UNITS: 100 INJECTION, SOLUTION INTRAVENOUS; SUBCUTANEOUS at 22:31

## 2020-10-11 RX ADMIN — METHOCARBAMOL TABLETS 500 MG: 500 TABLET, COATED ORAL at 13:28

## 2020-10-11 RX ADMIN — METHOCARBAMOL TABLETS 500 MG: 500 TABLET, COATED ORAL at 22:31

## 2020-10-11 RX ADMIN — OXCARBAZEPINE 300 MG: 150 TABLET, FILM COATED ORAL at 22:31

## 2020-10-11 RX ADMIN — METHOCARBAMOL TABLETS 500 MG: 500 TABLET, COATED ORAL at 09:09

## 2020-10-11 RX ADMIN — IPRATROPIUM BROMIDE AND ALBUTEROL 1 PUFF: 20; 100 SPRAY, METERED RESPIRATORY (INHALATION) at 08:24

## 2020-10-11 RX ADMIN — REMDESIVIR 100 MG: 100 INJECTION, POWDER, LYOPHILIZED, FOR SOLUTION INTRAVENOUS at 17:34

## 2020-10-11 RX ADMIN — ENOXAPARIN SODIUM 40 MG: 40 INJECTION SUBCUTANEOUS at 22:31

## 2020-10-11 RX ADMIN — OXCARBAZEPINE 300 MG: 150 TABLET, FILM COATED ORAL at 09:06

## 2020-10-11 RX ADMIN — CARVEDILOL 25 MG: 25 TABLET, FILM COATED ORAL at 09:09

## 2020-10-11 RX ADMIN — IPRATROPIUM BROMIDE AND ALBUTEROL 1 PUFF: 20; 100 SPRAY, METERED RESPIRATORY (INHALATION) at 22:40

## 2020-10-11 RX ADMIN — ACETAMINOPHEN 650 MG: 325 TABLET ORAL at 13:28

## 2020-10-11 RX ADMIN — OXCARBAZEPINE 300 MG: 150 TABLET, FILM COATED ORAL at 13:28

## 2020-10-11 ASSESSMENT — PAIN SCALES - GENERAL
PAINLEVEL_OUTOF10: 0

## 2020-10-11 NOTE — PROGRESS NOTES
Patient with temp as charted in doc flow sheets. Tylenol given as ordered. No needs at this time. Patient resting comfortably in bed. Call light in reach. Bed alarm on. Will continue to monitor.    Electronically signed by Khurram Valdes RN on 10/11/2020 at 7:03 AM

## 2020-10-11 NOTE — PROGRESS NOTES
Patient in bed resting comfortably. Respirations steady and unlabored. No signs of respiratory or cardiac distress. No complaints of pain at this time. IVF infusing as ordered. No needs at this time. Call light in reach and bed alarm in place. Will continue to monitor.   Electronically signed by Sukhdeep Sauceda RN on 10/11/2020 at 2:04 AM

## 2020-10-11 NOTE — PROGRESS NOTES
Called and updated patient's sister on patient's status and plan of care. All questions were answered at this time.  Electronically signed by Adamaris Perez RN on 10/11/2020 at 7:09 PM

## 2020-10-11 NOTE — PLAN OF CARE
Problem: Falls - Risk of:  Goal: Will remain free from falls  Description: Will remain free from falls  10/11/2020 1200 by Sada Gusman RN  Note: Pt is a Fall Risk. See Radha Hicks Fall Risk Score. Pt bed in low position and side rails up. Call light and belongings in reach. Pt encouraged to call for assistance. Will continue with hourly rounds for PO intake, pain needs, toileting, and repositioning as needed. Problem: Confusion - Acute:  Goal: Absence of continued neurological deterioration signs and symptoms  Description: Absence of continued neurological deterioration signs and symptoms  Outcome: Ongoing  Note: Pt only oriented to self. Will cont to monitor    Problem: Airway Clearance - Ineffective  Goal: Achieve or maintain patent airway  10/11/2020 1200 by Sada Gusman RN  Note: Pt COVID positive. SpO2 >90% on 3L via nasal cannula this shift. RR >10. No complaints of SOB. Will cont to monitor and assess.

## 2020-10-11 NOTE — PROGRESS NOTES
Patient with continued aphasia this morning. Extremities tend to be slightly weaker. Patient has difficulty finding words. Neurological assessment unchanged. No needs at this time. Patient resting comfortably in bed. Call light in reach. Bed alarm on. Will continue to monitor.    Electronically signed by Kaela Elias RN on 10/11/2020 at 7:31 AM

## 2020-10-11 NOTE — PLAN OF CARE
Pt free from falls this shift. Fall precautions in place at all times. Call light always within reach. Pt able and agreeable to contact for safety appropriately. Patient able to effectively clear airway at this time.

## 2020-10-12 LAB
A/G RATIO: 1.1 (ref 1.1–2.2)
ALBUMIN SERPL-MCNC: 3.4 G/DL (ref 3.4–5)
ALP BLD-CCNC: 52 U/L (ref 40–129)
ALT SERPL-CCNC: 34 U/L (ref 10–40)
ANION GAP SERPL CALCULATED.3IONS-SCNC: 7 MMOL/L (ref 3–16)
AST SERPL-CCNC: 22 U/L (ref 15–37)
BASOPHILS ABSOLUTE: 0 K/UL (ref 0–0.2)
BASOPHILS RELATIVE PERCENT: 0.3 %
BILIRUB SERPL-MCNC: <0.2 MG/DL (ref 0–1)
BUN BLDV-MCNC: 26 MG/DL (ref 7–20)
CALCIUM SERPL-MCNC: 9.4 MG/DL (ref 8.3–10.6)
CHLORIDE BLD-SCNC: 114 MMOL/L (ref 99–110)
CO2: 24 MMOL/L (ref 21–32)
CREAT SERPL-MCNC: 1.2 MG/DL (ref 0.8–1.3)
EOSINOPHILS ABSOLUTE: 0 K/UL (ref 0–0.6)
EOSINOPHILS RELATIVE PERCENT: 0 %
GFR AFRICAN AMERICAN: >60
GFR NON-AFRICAN AMERICAN: >60
GLOBULIN: 3.2 G/DL
GLUCOSE BLD-MCNC: 115 MG/DL (ref 70–99)
GLUCOSE BLD-MCNC: 120 MG/DL (ref 70–99)
GLUCOSE BLD-MCNC: 132 MG/DL (ref 70–99)
GLUCOSE BLD-MCNC: 242 MG/DL (ref 70–99)
GLUCOSE BLD-MCNC: 90 MG/DL (ref 70–99)
HCT VFR BLD CALC: 32.3 % (ref 40.5–52.5)
HEMOGLOBIN: 10.2 G/DL (ref 13.5–17.5)
LYMPHOCYTES ABSOLUTE: 0.7 K/UL (ref 1–5.1)
LYMPHOCYTES RELATIVE PERCENT: 9.8 %
MCH RBC QN AUTO: 29.5 PG (ref 26–34)
MCHC RBC AUTO-ENTMCNC: 31.7 G/DL (ref 31–36)
MCV RBC AUTO: 93.1 FL (ref 80–100)
MONOCYTES ABSOLUTE: 0.6 K/UL (ref 0–1.3)
MONOCYTES RELATIVE PERCENT: 8 %
NEUTROPHILS ABSOLUTE: 6.2 K/UL (ref 1.7–7.7)
NEUTROPHILS RELATIVE PERCENT: 81.9 %
PDW BLD-RTO: 15.7 % (ref 12.4–15.4)
PERFORMED ON: ABNORMAL
PERFORMED ON: NORMAL
PLATELET # BLD: 181 K/UL (ref 135–450)
PMV BLD AUTO: 9.2 FL (ref 5–10.5)
POTASSIUM REFLEX MAGNESIUM: 4.6 MMOL/L (ref 3.5–5.1)
PROCALCITONIN: 0.22 NG/ML (ref 0–0.15)
RBC # BLD: 3.47 M/UL (ref 4.2–5.9)
SODIUM BLD-SCNC: 145 MMOL/L (ref 136–145)
TOTAL PROTEIN: 6.6 G/DL (ref 6.4–8.2)
WBC # BLD: 7.6 K/UL (ref 4–11)

## 2020-10-12 PROCEDURE — 2580000003 HC RX 258: Performed by: INTERNAL MEDICINE

## 2020-10-12 PROCEDURE — 80053 COMPREHEN METABOLIC PANEL: CPT

## 2020-10-12 PROCEDURE — 6370000000 HC RX 637 (ALT 250 FOR IP): Performed by: INTERNAL MEDICINE

## 2020-10-12 PROCEDURE — 85025 COMPLETE CBC W/AUTO DIFF WBC: CPT

## 2020-10-12 PROCEDURE — 6360000002 HC RX W HCPCS: Performed by: INTERNAL MEDICINE

## 2020-10-12 PROCEDURE — 99232 SBSQ HOSP IP/OBS MODERATE 35: CPT | Performed by: INTERNAL MEDICINE

## 2020-10-12 PROCEDURE — 84145 PROCALCITONIN (PCT): CPT

## 2020-10-12 PROCEDURE — 36415 COLL VENOUS BLD VENIPUNCTURE: CPT

## 2020-10-12 PROCEDURE — 2500000003 HC RX 250 WO HCPCS: Performed by: INTERNAL MEDICINE

## 2020-10-12 PROCEDURE — 94640 AIRWAY INHALATION TREATMENT: CPT

## 2020-10-12 PROCEDURE — 2060000000 HC ICU INTERMEDIATE R&B

## 2020-10-12 RX ADMIN — DEXAMETHASONE 6 MG: 4 TABLET ORAL at 09:54

## 2020-10-12 RX ADMIN — Medication 10 ML: at 22:00

## 2020-10-12 RX ADMIN — METHOCARBAMOL TABLETS 500 MG: 500 TABLET, COATED ORAL at 16:13

## 2020-10-12 RX ADMIN — METHOCARBAMOL TABLETS 500 MG: 500 TABLET, COATED ORAL at 09:54

## 2020-10-12 RX ADMIN — IPRATROPIUM BROMIDE AND ALBUTEROL 1 PUFF: 20; 100 SPRAY, METERED RESPIRATORY (INHALATION) at 09:01

## 2020-10-12 RX ADMIN — OXCARBAZEPINE 300 MG: 150 TABLET, FILM COATED ORAL at 09:54

## 2020-10-12 RX ADMIN — SODIUM CHLORIDE: 9 INJECTION, SOLUTION INTRAVENOUS at 09:55

## 2020-10-12 RX ADMIN — CHLORPROMAZINE HYDROCHLORIDE 100 MG: 25 TABLET, SUGAR COATED ORAL at 09:55

## 2020-10-12 RX ADMIN — REMDESIVIR 100 MG: 100 INJECTION, POWDER, LYOPHILIZED, FOR SOLUTION INTRAVENOUS at 22:48

## 2020-10-12 RX ADMIN — CARVEDILOL 25 MG: 25 TABLET, FILM COATED ORAL at 18:26

## 2020-10-12 RX ADMIN — PANTOPRAZOLE SODIUM 40 MG: 40 TABLET, DELAYED RELEASE ORAL at 05:32

## 2020-10-12 RX ADMIN — CARVEDILOL 25 MG: 25 TABLET, FILM COATED ORAL at 09:54

## 2020-10-12 RX ADMIN — OXCARBAZEPINE 300 MG: 150 TABLET, FILM COATED ORAL at 22:00

## 2020-10-12 RX ADMIN — INSULIN LISPRO 1 UNITS: 100 INJECTION, SOLUTION INTRAVENOUS; SUBCUTANEOUS at 23:19

## 2020-10-12 RX ADMIN — METHOCARBAMOL TABLETS 500 MG: 500 TABLET, COATED ORAL at 22:00

## 2020-10-12 RX ADMIN — OXCARBAZEPINE 300 MG: 150 TABLET, FILM COATED ORAL at 16:12

## 2020-10-12 RX ADMIN — ENOXAPARIN SODIUM 40 MG: 40 INJECTION SUBCUTANEOUS at 22:00

## 2020-10-12 RX ADMIN — Medication 10 ML: at 09:56

## 2020-10-12 RX ADMIN — SENNOSIDES 17.2 MG: 8.6 TABLET, FILM COATED ORAL at 18:26

## 2020-10-12 RX ADMIN — ENOXAPARIN SODIUM 40 MG: 40 INJECTION SUBCUTANEOUS at 09:55

## 2020-10-12 ASSESSMENT — PAIN SCALES - GENERAL
PAINLEVEL_OUTOF10: 0

## 2020-10-12 ASSESSMENT — PAIN SCALES - WONG BAKER
WONGBAKER_NUMERICALRESPONSE: 0
WONGBAKER_NUMERICALRESPONSE: 0

## 2020-10-12 NOTE — PROGRESS NOTES
ID Follow-up NOTE     CC:   COVID-19 infection, fever   Antibiotics: Remdesivir    Admit Date: 10/6/2020  Hospital Day: 7    Subjective:     Patient does not c/o SOB, cough, CP      Objective:     Afebrile - 10/11 Tm 100/9    Patient Vitals for the past 8 hrs:   BP Temp Temp src Pulse Resp SpO2   10/12/20 1607 (!) 143/93 98.3 °F (36.8 °C) Oral 60 18 95 %   10/12/20 1400 -- -- -- 60 -- --   10/12/20 1200 -- -- -- 60 -- --   10/12/20 0947 (!) 146/91 97.8 °F (36.6 °C) Oral 61 20 91 %     I/O last 3 completed shifts: In: 1200.5 [P.O.:380; I.V.:820.5]  Out: -   No intake/output data recorded. EXAM:  GENERAL: No apparent distress. 5L  HEENT: Membranes moist, no oral lesion  NECK:  Supple, no lymphadenopathy  LUNGS: Clear b/l, no rales, no dullness  CARDIAC: RRR, no murmur appreciated  ABD:  + BS, soft / NT  EXT:  No rash, no edema, no lesions  NEURO: No focal neurologic findings  PSYCH: Orientation, sensorium, mood normal  LINES:  Peripheral iv       Data Review:  Lab Results   Component Value Date    WBC 7.6 10/12/2020    HGB 10.2 (L) 10/12/2020    HCT 32.3 (L) 10/12/2020    MCV 93.1 10/12/2020     10/12/2020     Lab Results   Component Value Date    CREATININE 1.2 10/12/2020    BUN 26 (H) 10/12/2020     10/12/2020    K 4.6 10/12/2020     (H) 10/12/2020    CO2 24 10/12/2020       Hepatic Function Panel:   Lab Results   Component Value Date    ALKPHOS 52 10/12/2020    ALT 34 10/12/2020    AST 22 10/12/2020    PROT 6.6 10/12/2020    PROT 7.1 01/01/2010    BILITOT <0.2 10/12/2020    BILIDIR <0.2 08/08/2019    IBILI see below 08/08/2019    LABALBU 3.4 10/12/2020       MICRO:  10/7 CSF - coagulase negative Staphylococcus in broth  10/7 CSF M/E panel neg  10/6 BC no growth  10/7 SARS CoV-2 positive    IMAGING:  10/6 PCXR    Impression    1. Left basilar airspace disease, infrahilar atelectasis.     2. Groundglass density in the right lung is predominantly secondary to poor inspiratory effort and is noted at the overlap of the anterior and posterior ribs.  Further clarification with noncontrast computed tomography is suggested.       10/7 CT Chest WO contrast  Impression    Several small airspace opacities in bilateral upper lobes may be part of    atelectasis versus early pneumonia.        Scheduled Meds:   pantoprazole  40 mg Oral Daily    remdesivir IVPB  100 mg Intravenous Q24H    carvedilol  25 mg Oral BID WC    methocarbamol  500 mg Oral TID    senna  2 tablet Oral QPM    enoxaparin  40 mg Subcutaneous BID    dexamethasone  6 mg Oral Daily    insulin lispro  0-6 Units Subcutaneous TID WC    insulin lispro  0-3 Units Subcutaneous Nightly    OXcarbazepine  300 mg Oral TID    chlorproMAZINE  100 mg Oral BID    albuterol-ipratropium  1 puff Inhalation BID    sodium chloride flush  10 mL Intravenous 2 times per day       Continuous Infusions:   dextrose         PRN Meds:  sodium chloride, dextromethorphan-guaiFENesin, hydrocortisone, polyvinyl alcohol, glucose, dextrose, glucagon (rDNA), dextrose, albuterol-ipratropium, sodium chloride flush, potassium chloride, magnesium sulfate, acetaminophen **OR** acetaminophen      Assessment:        77 yo man with mult medical problem, CAD / DM / HTN / JONATHAN  Lives in [facility]     Presents 10/6 with lethargy - 'minimally responsive'  T 102.5, WBC 10.3, UA neg  CSF with WBC 32, , glu 120, prot >120  Chest CT with airspace d' CT 'atelectasis vs early pneumonia'     10/7 Tm 101.  On 2L  10/8 Tm 102.7, 2L     BC no growth  Procalcitonin 0.88  Meningitis / Encephalitis panel neg     IMP/  Mult co-morbidities  Fever - resolved (10/11 Tm 100.9)  Encephalopathy - unclear what baseline is  Question pneumonia - mild/minor changes on x-ray and CT  Meningitis, aseptic - has lymphocytic pleocytosis, Meningitis / Encephalitis panel neg    COVID-19 infection - explains fever, encephalopathy (not aseptic meningitis)    Plan:     Continue Remdesivir, d#3  Cont Dexamethasone, d#4  Anticoagulation  Supportive care  Monitor clinically / lab -  CRP, ferritin for 10/13    Discussed with pt  Leticia Lorenz MD

## 2020-10-12 NOTE — PLAN OF CARE
Pt free from falls this shift. Fall precautions in place at all times. Call light always within reach. Pt able and agreeable to contact for safety appropriately. Patient able to clear airway at this time.

## 2020-10-12 NOTE — PROGRESS NOTES
Patient keeps calling out every five minutes to tell the nurse something about his TV and to anticipate how and when he will get ready for lunch. Instructed patient that it is not lunch time and attempted reorientation to time and place. Patient with significant expressive aphasia at times. Patient clean and dry. Patient states he does not need anything but just wants to tell me something. Patient continues to state same thing over and over again. Instructed patient that RN will not be coming back into the room at this time due to continual COVID exposure. No needs at this time. Patient resting comfortably in bed. Call light in reach. Bed alarm on. Will continue to monitor.    Electronically signed by Hailey Braden RN on 10/12/2020 at 1:02 AM

## 2020-10-12 NOTE — CARE COORDINATION
Case Management Assessment           Daily Note                 Date/ Time of Note: 10/12/2020 11:10 AM         Note completed by: Reba Quintanilla    Patient Name: Nadine Jack  YOB: 1955    Diagnosis:Sepsis [facility]) [A41.9]  Sepsis ([facility] [A41.9]  Patient Admission Status: Inpatient    Date of Admission:10/6/2020  9:22 PM Length of Stay: 5 GLOS:      Current Plan of Care: day 3/5 Remdesivir, 5L O2  ________________________________________________________________________________________  PT AM-PAC: 18 / 24 per last evaluation on: 10.7      OT AM-PAC:   / 24 per last evaluation on:     DME Needs for discharge: n/a  ________________________________________________________________________________________  Discharge Plan: [address] [place] [address] ([facility]): [facility]    Tentative discharge date: TBD    Current barriers to discharge: Medical clearance      ________________________________________________________________________________________  Case Management Notes:  Patient is from [facility] at [facility]. Patient is on Remdesivir, IVF and on 5L oxygen. Patient will return to [facility] when medically stable. OSMAN called Ayse in admissions at [facility] to see if they will need any additional testing or what their requirements are for return, left VM. Sixto Peters and his family were provided with choice of provider; he and his family are in agreement with the discharge plan.     Care Transition Patient: Ingrid Quintanilla RN  [facility]  Case Management Department  Ph: [phone]  Fax: [phone]

## 2020-10-12 NOTE — PROGRESS NOTES
range of motion. No jugular venous distention. Trachea midline. Respiratory:  Normal respiratory effort. Clear to auscultation, bilaterally without Rales/Wheezes/Rhonchi. Cardiovascular:  Regular rate and rhythm with normal S1/S2 without murmurs, rubs or gallops. Abdomen: Soft, non-tender, non-distended with normal bowel sounds. Musculoskeletal:  No clubbing, cyanosis or edema bilaterally. Full range of motion without deformity. Skin: Skin color, texture, turgor normal.  No rashes or lesions. Neurologic:  Alert and oriented x3. Follow commands. grossly non-focal.  Psychiatric:  Awake  Capillary Refill: Brisk,< 3 seconds   Peripheral Pulses: +2 palpable, equal bilaterally       Labs:   Recent Labs     10/10/20  0630 10/11/20  0634   WBC 4.1 8.0   HGB 10.8* 10.4*   HCT 34.3* 33.0*   * 144     Recent Labs     10/10/20  0630 10/11/20  0634    145   K 4.6 4.5    112*   CO2 26 23   BUN 19 23*   CREATININE 1.3 1.2   CALCIUM 9.4 9.3     Recent Labs     10/10/20  0630 10/11/20  0634   AST 41* 42*   ALT 39 44*   BILITOT <0.2 <0.2   ALKPHOS 63 59     Recent Labs     10/11/20  2026   INR 0.98     No results for input(s): Heide Ash in the last 72 hours. Urinalysis:      Lab Results   Component Value Date    NITRU Negative 10/06/2020    WBCUA None seen 10/06/2020    BACTERIA Rare 08/08/2019    RBCUA 3-4 10/06/2020    BLOODU TRACE-INTACT 10/06/2020    SPECGRAV 1.025 10/06/2020    GLUCOSEU Negative 10/06/2020    GLUCOSEU NEGATIVE 01/01/2010       Radiology:  CT HEAD WO CONTRAST   Final Result   No acute intracranial findings or substantial change      CT CHEST WO CONTRAST   Final Result   Several small airspace opacities in bilateral upper lobes may be part of    atelectasis versus early pneumonia. XR CHEST PORTABLE   Final Result   1. Left basilar airspace disease, infrahilar atelectasis.    2. Groundglass density in the right lung is predominantly secondary to poor inspiratory effort and is noted at the overlap of the anterior and posterior ribs. Further clarification with noncontrast computed tomography is suggested. Assessment/Plan:    Sepsis secondary to COVID19  Procalcitonin elevated, initially on vancomycin and meropenem x 4 days, as well as acyclovir for possible meningitis, discontinued  Cx NGTD  CSF suggestive of aseptic lymphocytic meningitis. Meningitis encephalitis panel negative. CTPA negative for PE. +small air space opacities    Continue droplet plus isolation  -Continues on Decadron  -Pulmonology following. He continues on Remdesivir.    -recheck D-Dimer is low  -daily labs including D-dimer  -stop IV fluids     Acute metabolic encephalopathy, improved  Likely secondary to above  PT OT      JOSÉ on CKD 3, resolving  Monitor creatinine  S/p gentle IV fluids     CAD/chronic systolic heart failure/hypertension:  Holding lisinopril with concern to JOSÉ  Coreg resumed  Off fluids     Sick sinus syndrome:  Status post pacemaker placement     Diabetes mellitus type 2:  Monitor blood glucose  Speech eval  On carb controlled diet and sliding scale insulin     JONATHAN:  Continue nightly BiPAP/oxygen     Paranoid schizophrenia:  Resumed oxcarbazepine and chlorpromazine  Holding olanzapine for now as patient still confused at times     DVT Prophylaxis: Lovenox 40 mg BID  Diet: Carb Control   Code Status: Full Code     PT/OT Eval Status: Ordered     Dispo -inpatient    [place], DO

## 2020-10-12 NOTE — PROGRESS NOTES
Patient is alert to self and place. Patient is forgetful. O2 4L, sat 91%. No complaints of pain or SOB. Respirations appear to be easy and unlabored. Lungs clear. Respirations easy with no complaints of cough. Cardiac monitor in place, current rhythm vpaced. IVF infusing per left wrist PIV as ordered. Incontinent of urine. No complaints of nausea/vomiting/diarrhea. Up with assist/walker to the bathroom/BSC as needed. Tolerating diabetic diet. Plan of care and safety measures reviewed with the patient. Call light in reach and bed alarm in place. Will continue to monitor.   Electronically signed by Tabby Hood RN on 10/11/2020 at 8:38 PM

## 2020-10-13 LAB
A/G RATIO: 1 (ref 1.1–2.2)
ALBUMIN SERPL-MCNC: 3.3 G/DL (ref 3.4–5)
ALP BLD-CCNC: 58 U/L (ref 40–129)
ALT SERPL-CCNC: 26 U/L (ref 10–40)
ANION GAP SERPL CALCULATED.3IONS-SCNC: 10 MMOL/L (ref 3–16)
AST SERPL-CCNC: 16 U/L (ref 15–37)
BASOPHILS ABSOLUTE: 0 K/UL (ref 0–0.2)
BASOPHILS RELATIVE PERCENT: 0.2 %
BILIRUB SERPL-MCNC: <0.2 MG/DL (ref 0–1)
BILIRUBIN DIRECT: <0.2 MG/DL (ref 0–0.3)
BILIRUBIN, INDIRECT: NORMAL MG/DL (ref 0–1)
BUN BLDV-MCNC: 26 MG/DL (ref 7–20)
CALCIUM SERPL-MCNC: 9.6 MG/DL (ref 8.3–10.6)
CHLORIDE BLD-SCNC: 112 MMOL/L (ref 99–110)
CO2: 23 MMOL/L (ref 21–32)
CREAT SERPL-MCNC: 1.2 MG/DL (ref 0.8–1.3)
D DIMER: <200 NG/ML DDU (ref 0–229)
EOSINOPHILS ABSOLUTE: 0 K/UL (ref 0–0.6)
EOSINOPHILS RELATIVE PERCENT: 0 %
GFR AFRICAN AMERICAN: >60
GFR NON-AFRICAN AMERICAN: >60
GLOBULIN: 3.3 G/DL
GLUCOSE BLD-MCNC: 111 MG/DL (ref 70–99)
GLUCOSE BLD-MCNC: 116 MG/DL (ref 70–99)
GLUCOSE BLD-MCNC: 178 MG/DL (ref 70–99)
GLUCOSE BLD-MCNC: 194 MG/DL (ref 70–99)
GLUCOSE BLD-MCNC: 90 MG/DL (ref 70–99)
HCT VFR BLD CALC: 33.6 % (ref 40.5–52.5)
HEMOGLOBIN: 10.5 G/DL (ref 13.5–17.5)
LYMPHOCYTES ABSOLUTE: 0.8 K/UL (ref 1–5.1)
LYMPHOCYTES RELATIVE PERCENT: 12.3 %
MCH RBC QN AUTO: 29.4 PG (ref 26–34)
MCHC RBC AUTO-ENTMCNC: 31.3 G/DL (ref 31–36)
MCV RBC AUTO: 93.8 FL (ref 80–100)
MONOCYTES ABSOLUTE: 0.5 K/UL (ref 0–1.3)
MONOCYTES RELATIVE PERCENT: 8.6 %
NEUTROPHILS ABSOLUTE: 5 K/UL (ref 1.7–7.7)
NEUTROPHILS RELATIVE PERCENT: 78.9 %
PDW BLD-RTO: 15.7 % (ref 12.4–15.4)
PERFORMED ON: ABNORMAL
PERFORMED ON: NORMAL
PLATELET # BLD: 195 K/UL (ref 135–450)
PMV BLD AUTO: 9.4 FL (ref 5–10.5)
POTASSIUM REFLEX MAGNESIUM: 5 MMOL/L (ref 3.5–5.1)
RBC # BLD: 3.58 M/UL (ref 4.2–5.9)
SODIUM BLD-SCNC: 145 MMOL/L (ref 136–145)
TOTAL PROTEIN: 6.6 G/DL (ref 6.4–8.2)
WBC # BLD: 6.4 K/UL (ref 4–11)

## 2020-10-13 PROCEDURE — 6370000000 HC RX 637 (ALT 250 FOR IP): Performed by: INTERNAL MEDICINE

## 2020-10-13 PROCEDURE — 85025 COMPLETE CBC W/AUTO DIFF WBC: CPT

## 2020-10-13 PROCEDURE — 6360000002 HC RX W HCPCS: Performed by: INTERNAL MEDICINE

## 2020-10-13 PROCEDURE — 80053 COMPREHEN METABOLIC PANEL: CPT

## 2020-10-13 PROCEDURE — 94761 N-INVAS EAR/PLS OXIMETRY MLT: CPT

## 2020-10-13 PROCEDURE — 2700000000 HC OXYGEN THERAPY PER DAY

## 2020-10-13 PROCEDURE — 94640 AIRWAY INHALATION TREATMENT: CPT

## 2020-10-13 PROCEDURE — 2580000003 HC RX 258: Performed by: INTERNAL MEDICINE

## 2020-10-13 PROCEDURE — 85379 FIBRIN DEGRADATION QUANT: CPT

## 2020-10-13 PROCEDURE — 2060000000 HC ICU INTERMEDIATE R&B

## 2020-10-13 RX ORDER — CHLORPROMAZINE HYDROCHLORIDE 25 MG/1
100 TABLET, FILM COATED ORAL 2 TIMES DAILY
Status: DISCONTINUED | OUTPATIENT
Start: 2020-10-13 | End: 2020-10-16 | Stop reason: HOSPADM

## 2020-10-13 RX ADMIN — IPRATROPIUM BROMIDE AND ALBUTEROL 1 PUFF: 20; 100 SPRAY, METERED RESPIRATORY (INHALATION) at 23:04

## 2020-10-13 RX ADMIN — METHOCARBAMOL TABLETS 500 MG: 500 TABLET, COATED ORAL at 20:35

## 2020-10-13 RX ADMIN — CARVEDILOL 25 MG: 25 TABLET, FILM COATED ORAL at 17:17

## 2020-10-13 RX ADMIN — INSULIN LISPRO 1 UNITS: 100 INJECTION, SOLUTION INTRAVENOUS; SUBCUTANEOUS at 22:04

## 2020-10-13 RX ADMIN — CHLORPROMAZINE HYDROCHLORIDE 100 MG: 100 TABLET, SUGAR COATED ORAL at 09:28

## 2020-10-13 RX ADMIN — CHLORPROMAZINE HYDROCHLORIDE 100 MG: 100 TABLET, SUGAR COATED ORAL at 22:04

## 2020-10-13 RX ADMIN — OXCARBAZEPINE 300 MG: 150 TABLET, FILM COATED ORAL at 20:34

## 2020-10-13 RX ADMIN — Medication 10 ML: at 20:35

## 2020-10-13 RX ADMIN — CARVEDILOL 25 MG: 25 TABLET, FILM COATED ORAL at 09:26

## 2020-10-13 RX ADMIN — ENOXAPARIN SODIUM 40 MG: 40 INJECTION SUBCUTANEOUS at 09:29

## 2020-10-13 RX ADMIN — METHOCARBAMOL TABLETS 500 MG: 500 TABLET, COATED ORAL at 09:26

## 2020-10-13 RX ADMIN — PANTOPRAZOLE SODIUM 40 MG: 40 TABLET, DELAYED RELEASE ORAL at 06:15

## 2020-10-13 RX ADMIN — OXCARBAZEPINE 300 MG: 150 TABLET, FILM COATED ORAL at 09:27

## 2020-10-13 RX ADMIN — SENNOSIDES 17.2 MG: 8.6 TABLET, FILM COATED ORAL at 17:17

## 2020-10-13 RX ADMIN — Medication 10 ML: at 09:29

## 2020-10-13 RX ADMIN — DEXAMETHASONE 6 MG: 4 TABLET ORAL at 09:27

## 2020-10-13 RX ADMIN — INSULIN LISPRO 1 UNITS: 100 INJECTION, SOLUTION INTRAVENOUS; SUBCUTANEOUS at 17:17

## 2020-10-13 RX ADMIN — OXCARBAZEPINE 300 MG: 150 TABLET, FILM COATED ORAL at 15:19

## 2020-10-13 RX ADMIN — IPRATROPIUM BROMIDE AND ALBUTEROL 1 PUFF: 20; 100 SPRAY, METERED RESPIRATORY (INHALATION) at 08:39

## 2020-10-13 RX ADMIN — METHOCARBAMOL TABLETS 500 MG: 500 TABLET, COATED ORAL at 15:19

## 2020-10-13 RX ADMIN — ENOXAPARIN SODIUM 40 MG: 40 INJECTION SUBCUTANEOUS at 20:35

## 2020-10-13 ASSESSMENT — PAIN SCALES - WONG BAKER

## 2020-10-13 ASSESSMENT — PAIN SCALES - GENERAL
PAINLEVEL_OUTOF10: 0

## 2020-10-13 NOTE — PROGRESS NOTES
Hospitalist Progress Note      PCP: Lee Rivera MD    Date of Admission: 10/6/2020    Chief Complaint: Altered mental status    Hospital Course: Patient admitted for sepsis and acute metabolic encephalopathy sec to COVID. IV abx discontinued. ID following. Subjective: On 5 L O2 most of the day. He appears lethargic but more alert after talking for a while. Denies complaints other than being tired. Medications:  Reviewed    Infusion Medications    dextrose       Scheduled Medications    chlorproMAZINE  100 mg Oral BID    pantoprazole  40 mg Oral Daily    remdesivir IVPB  100 mg Intravenous Q24H    carvedilol  25 mg Oral BID WC    methocarbamol  500 mg Oral TID    senna  2 tablet Oral QPM    enoxaparin  40 mg Subcutaneous BID    dexamethasone  6 mg Oral Daily    insulin lispro  0-6 Units Subcutaneous TID WC    insulin lispro  0-3 Units Subcutaneous Nightly    OXcarbazepine  300 mg Oral TID    albuterol-ipratropium  1 puff Inhalation BID    sodium chloride flush  10 mL Intravenous 2 times per day     PRN Meds: sodium chloride, dextromethorphan-guaiFENesin, hydrocortisone, polyvinyl alcohol, glucose, dextrose, glucagon (rDNA), dextrose, albuterol-ipratropium, sodium chloride flush, potassium chloride, magnesium sulfate, acetaminophen **OR** acetaminophen      Intake/Output Summary (Last 24 hours) at 10/13/2020 1613  Last data filed at 10/12/2020 2000  Gross per 24 hour   Intake 650 ml   Output --   Net 650 ml       Physical Exam Performed:    /76   Pulse 59   Temp 98.6 °F (37 °C)   Resp 16   Ht 5' 10\" (1.778 m)   Wt 227 lb 8 oz (103.2 kg)   SpO2 93%   BMI 32.64 kg/m²     General appearance:  No distress. Obese  HEENT:  Normal cephalic, atraumatic without obvious deformity. Pupils equal, round, and reactive to light. Extra ocular muscles intact. Conjunctivae/corneas clear. Neck: Supple, with full range of motion. No jugular venous distention. Trachea midline.   Respiratory: Normal respiratory effort. Clear to auscultation, bilaterally without Rales/Wheezes/Rhonchi. Cardiovascular:  Regular rate and rhythm with normal S1/S2 without murmurs, rubs or gallops. Abdomen: Soft, obese, non-tender, non-distended with normal bowel sounds. Musculoskeletal:  No clubbing, cyanosis or edema bilaterally. Full range of motion without deformity. Skin: Skin color, texture, turgor normal.  No rashes or lesions. Neurologic:  Alert and oriented x3. Follow commands. grossly non-focal.  Psychiatric:  Awake  Capillary Refill: Brisk,< 3 seconds   Peripheral Pulses: +2 palpable, equal bilaterally       Labs:   Recent Labs     10/11/20  0634 10/12/20  1432 10/13/20  0531   WBC 8.0 7.6 6.4   HGB 10.4* 10.2* 10.5*   HCT 33.0* 32.3* 33.6*    181 195     Recent Labs     10/11/20  0634 10/12/20  1432 10/13/20  0531    145 145   K 4.5 4.6 5.0   * 114* 112*   CO2 23 24 23   BUN 23* 26* 26*   CREATININE 1.2 1.2 1.2   CALCIUM 9.3 9.4 9.6     Recent Labs     10/11/20  0634 10/12/20  1432 10/13/20  0531   AST 42* 22 16   ALT 44* 34 26   BILIDIR  --   --  <0.2   BILITOT <0.2 <0.2 <0.2   ALKPHOS 59 52 58     Recent Labs     10/11/20  2026   INR 0.98     No results for input(s): Heide Ash in the last 72 hours. Urinalysis:      Lab Results   Component Value Date    NITRU Negative 10/06/2020    WBCUA None seen 10/06/2020    BACTERIA Rare 08/08/2019    RBCUA 3-4 10/06/2020    BLOODU TRACE-INTACT 10/06/2020    SPECGRAV 1.025 10/06/2020    GLUCOSEU Negative 10/06/2020    GLUCOSEU NEGATIVE 01/01/2010       Radiology:  CT HEAD WO CONTRAST   Final Result   No acute intracranial findings or substantial change      CT CHEST WO CONTRAST   Final Result   Several small airspace opacities in bilateral upper lobes may be part of    atelectasis versus early pneumonia. XR CHEST PORTABLE   Final Result   1. Left basilar airspace disease, infrahilar atelectasis.    2. Groundglass density in the right lung is predominantly secondary to poor inspiratory effort and is noted at the overlap of the anterior and posterior ribs. Further clarification with noncontrast computed tomography is suggested. Assessment/Plan:    Sepsis secondary to COVID19  Procalcitonin elevated, initially on vancomycin and meropenem x 4 days, as well as acyclovir for possible meningitis, discontinued  Cx NGTD  CSF suggestive of aseptic lymphocytic meningitis. Meningitis encephalitis panel negative. CTPA negative for PE. +small air space opacities    Continue droplet plus isolation  -Continues on Decadron  -Pulmonology following, appreciate recs.  He continues on Remdesivir.   -daily labs including D-dimer - continue to follow for A/C     Acute metabolic encephalopathy, much better but continues to wax and wane somewhat  Likely secondary to above as well as underlying schizophrenia  PT OT      JOSÉ on CKD 3, resolving  Monitor creatinine  S/p gentle IV fluids     CAD/chronic systolic heart failure/hypertension:  Holding lisinopril with concern to JOSÉ  Coreg resumed  Off fluids     Sick sinus syndrome:  Status post pacemaker placement     Diabetes mellitus type 2:  Monitor blood glucose  Speech eval  On carb controlled diet and sliding scale insulin     JONATHAN:  Continue nightly BiPAP/oxygen     Paranoid schizophrenia:  Resumed oxcarbazepine and chlorpromazine  Holding olanzapine for now as patient still confused at times     DVT Prophylaxis: Lovenox 40 mg BID  Diet: Carb Control   Code Status: Full Code     PT/OT Eval Status: Ordered     Dispo -inpatient    [facility] HospitalDO

## 2020-10-13 NOTE — PLAN OF CARE
Problem: Falls - Risk of:  Goal: Will remain free from falls  Description: Will remain free from falls  Note: Pt is a Fall Risk. See [place] Median Fall Risk Score. Pt bed in low position and side rails up. Call light and belongings in reach. Pt encouraged to call for assistance. Will continue with hourly rounds for PO intake, pain needs, toileting, and repositioning as needed. Problem: Airway Clearance - Ineffective  Goal: Achieve or maintain patent airway  Note: Pt COVID positive. SpO2 >90% on 5L via nasal cannula this shift. RR >10. No complaints of SOB. Will cont to monitor and assess.

## 2020-10-13 NOTE — PLAN OF CARE
Problem: Falls - Risk of:  Goal: Will remain free from falls  Description: Will remain free from falls  10/12/2020 2242 by Donna Bryan RN  Outcome: Met This Shift  10/12/2020 2106 by Sada Gusman RN  Note: Pt is a Fall Risk. See Radha Hicks Fall Risk Score. Pt bed in low position and side rails up. Call light and belongings in reach. Pt encouraged to call for assistance. Will continue with hourly rounds for PO intake, pain needs, toileting, and repositioning as needed. Goal: Absence of physical injury  Description: Absence of physical injury  Outcome: Met This Shift     Problem: Discharge Planning:  Goal: Ability to perform activities of daily living will improve  Description: Ability to perform activities of daily living will improve  Outcome: Not Met This Shift  Goal: Participates in care planning  Description: Participates in care planning  Outcome: Not Met This Shift     Problem: Injury - Risk of, Physical Injury:  Goal: Will remain free from falls  Description: Will remain free from falls  10/12/2020 2242 by Donna Bryan RN  Outcome: Met This Shift  10/12/2020 2106 by Sada Gusman RN  Note: Pt is a Fall Risk. See Radha Hicks Fall Risk Score. Pt bed in low position and side rails up. Call light and belongings in reach. Pt encouraged to call for assistance. Will continue with hourly rounds for PO intake, pain needs, toileting, and repositioning as needed.      Goal: Absence of physical injury  Description: Absence of physical injury  Outcome: Met This Shift     Problem: Mood - Altered:  Goal: Mood stable  Description: Mood stable  Outcome: Met This Shift  Goal: Absence of abusive behavior  Description: Absence of abusive behavior  Outcome: Met This Shift  Goal: Verbalizations of feeling emotionally comfortable while being cared for will increase  Description: Verbalizations of feeling emotionally comfortable while being cared for will increase  Outcome: Ongoing     Problem: Psychomotor Activity Ability to maintain normal respiratory secretions will improve  Outcome: Not Met This Shift     Problem: Airway Clearance - Ineffective  Goal: Achieve or maintain patent airway  10/12/2020 2242 by Alessandra Nix RN  Outcome: Met This Shift  10/12/2020 2106 by Mina Cheng RN  Note: Pt COVID positive. SpO2 >90% on 5L via nasal cannula this shift. RR >10. No complaints of SOB. Will cont to monitor and assess. Problem: Gas Exchange - Impaired  Goal: Absence of hypoxia  Outcome: Ongoing  Goal: Promote optimal lung function  Outcome: Ongoing     Problem: Breathing Pattern - Ineffective  Goal: Ability to achieve and maintain a regular respiratory rate  Outcome: Ongoing     Problem:  Body Temperature -  Risk of, Imbalanced  Goal: Ability to maintain a body temperature within defined limits  Outcome: Met This Shift  Goal: Will regain or maintain usual level of consciousness  Outcome: Not Met This Shift  Goal: Complications related to the disease process, condition or treatment will be avoided or minimized  Outcome: Not Met This Shift     Problem: Isolation Precautions - Risk of Spread of Infection  Goal: Prevent transmission of infection  Outcome: Met This Shift     Problem: Nutrition Deficits  Goal: Optimize nutrtional status  Outcome: Met This Shift     Problem: Risk for Fluid Volume Deficit  Goal: Maintain normal heart rhythm  Outcome: Met This Shift  Goal: Maintain absence of muscle cramping  Outcome: Met This Shift  Goal: Maintain normal serum potassium, sodium, calcium, phosphorus, and pH  Outcome: Ongoing     Problem: Loneliness or Risk for Loneliness  Goal: Demonstrate positive use of time alone when socialization is not possible  Outcome: Ongoing     Problem: Fatigue  Goal: Verbalize increase energy and improved vitality  Outcome: Not Met This Shift

## 2020-10-13 NOTE — CARE COORDINATION
Case Management Assessment           Daily Note                 Date/ Time of Note: 10/13/2020 1:39 PM         Note completed by: Celina Crews    Patient Name: John Bojorquez  YOB: 1955    Diagnosis:Sepsis [facility]) [A41.9]  Sepsis ([facility] [A41.9]  Patient Admission Status: Inpatient    Date of Admission:10/6/2020  9:22 PM Length of Stay: 6 GLOS:      Current Plan of Care: continues on remdesivir day 3/5, 6L O2  ________________________________________________________________________________________  PT AM-PAC: 18 / 24 per last evaluation on: 10.7    OT AM-PAC:   / 24 per last evaluation on:     DME Needs for discharge: defer  ________________________________________________________________________________________  Discharge Plan: [address] [place] [address] ([facility]): Karolyn    Tentative discharge date: TBD    Current barriers to discharge: medical clearance      ________________________________________________________________________________________  Case Management Notes:  Patient can return to [facility] at discharge, even if COVID positive. Mignon Mckeon and his family were provided with choice of provider; he and his family are in agreement with the discharge plan.     Care Transition Patient: No    Celina Crews, RN  [facility]  Case Management Department  Ph: [phone]  Fax: [phone]

## 2020-10-14 LAB
A/G RATIO: 0.9 (ref 1.1–2.2)
ALBUMIN SERPL-MCNC: 3.1 G/DL (ref 3.4–5)
ALP BLD-CCNC: 56 U/L (ref 40–129)
ALT SERPL-CCNC: 29 U/L (ref 10–40)
ANION GAP SERPL CALCULATED.3IONS-SCNC: 7 MMOL/L (ref 3–16)
AST SERPL-CCNC: 17 U/L (ref 15–37)
BASOPHILS ABSOLUTE: 0 K/UL (ref 0–0.2)
BASOPHILS RELATIVE PERCENT: 0.2 %
BILIRUB SERPL-MCNC: <0.2 MG/DL (ref 0–1)
BILIRUBIN DIRECT: <0.2 MG/DL (ref 0–0.3)
BILIRUBIN, INDIRECT: NORMAL MG/DL (ref 0–1)
BUN BLDV-MCNC: 32 MG/DL (ref 7–20)
C-REACTIVE PROTEIN: 53.5 MG/L (ref 0–5.1)
CALCIUM SERPL-MCNC: 9.7 MG/DL (ref 8.3–10.6)
CHLORIDE BLD-SCNC: 112 MMOL/L (ref 99–110)
CO2: 26 MMOL/L (ref 21–32)
CREAT SERPL-MCNC: 1.4 MG/DL (ref 0.8–1.3)
D DIMER: <200 NG/ML DDU (ref 0–229)
EOSINOPHILS ABSOLUTE: 0 K/UL (ref 0–0.6)
EOSINOPHILS RELATIVE PERCENT: 0.2 %
FERRITIN: 484.2 NG/ML (ref 30–400)
GFR AFRICAN AMERICAN: >60
GFR NON-AFRICAN AMERICAN: 51
GLOBULIN: 3.4 G/DL
GLUCOSE BLD-MCNC: 135 MG/DL (ref 70–99)
GLUCOSE BLD-MCNC: 161 MG/DL (ref 70–99)
GLUCOSE BLD-MCNC: 163 MG/DL (ref 70–99)
GLUCOSE BLD-MCNC: 90 MG/DL (ref 70–99)
GLUCOSE BLD-MCNC: 95 MG/DL (ref 70–99)
HCT VFR BLD CALC: 33.3 % (ref 40.5–52.5)
HEMOGLOBIN: 10.5 G/DL (ref 13.5–17.5)
LYMPHOCYTES ABSOLUTE: 1.4 K/UL (ref 1–5.1)
LYMPHOCYTES RELATIVE PERCENT: 18.6 %
MCH RBC QN AUTO: 29.2 PG (ref 26–34)
MCHC RBC AUTO-ENTMCNC: 31.6 G/DL (ref 31–36)
MCV RBC AUTO: 92.6 FL (ref 80–100)
MONOCYTES ABSOLUTE: 0.6 K/UL (ref 0–1.3)
MONOCYTES RELATIVE PERCENT: 8.1 %
NEUTROPHILS ABSOLUTE: 5.4 K/UL (ref 1.7–7.7)
NEUTROPHILS RELATIVE PERCENT: 72.9 %
PDW BLD-RTO: 15.4 % (ref 12.4–15.4)
PERFORMED ON: ABNORMAL
PERFORMED ON: NORMAL
PLATELET # BLD: 249 K/UL (ref 135–450)
PMV BLD AUTO: 9.2 FL (ref 5–10.5)
POTASSIUM REFLEX MAGNESIUM: 4.4 MMOL/L (ref 3.5–5.1)
RBC # BLD: 3.6 M/UL (ref 4.2–5.9)
SODIUM BLD-SCNC: 145 MMOL/L (ref 136–145)
TOTAL PROTEIN: 6.5 G/DL (ref 6.4–8.2)
WBC # BLD: 7.4 K/UL (ref 4–11)

## 2020-10-14 PROCEDURE — 2580000003 HC RX 258: Performed by: INTERNAL MEDICINE

## 2020-10-14 PROCEDURE — 94761 N-INVAS EAR/PLS OXIMETRY MLT: CPT

## 2020-10-14 PROCEDURE — 6360000002 HC RX W HCPCS: Performed by: INTERNAL MEDICINE

## 2020-10-14 PROCEDURE — 2700000000 HC OXYGEN THERAPY PER DAY

## 2020-10-14 PROCEDURE — 85379 FIBRIN DEGRADATION QUANT: CPT

## 2020-10-14 PROCEDURE — 82728 ASSAY OF FERRITIN: CPT

## 2020-10-14 PROCEDURE — 85025 COMPLETE CBC W/AUTO DIFF WBC: CPT

## 2020-10-14 PROCEDURE — 2500000003 HC RX 250 WO HCPCS: Performed by: INTERNAL MEDICINE

## 2020-10-14 PROCEDURE — 86140 C-REACTIVE PROTEIN: CPT

## 2020-10-14 PROCEDURE — 05H333Z INSERTION OF INFUSION DEVICE INTO RIGHT INNOMINATE VEIN, PERCUTANEOUS APPROACH: ICD-10-PCS | Performed by: INTERNAL MEDICINE

## 2020-10-14 PROCEDURE — 2060000000 HC ICU INTERMEDIATE R&B

## 2020-10-14 PROCEDURE — 6370000000 HC RX 637 (ALT 250 FOR IP): Performed by: INTERNAL MEDICINE

## 2020-10-14 PROCEDURE — C1751 CATH, INF, PER/CENT/MIDLINE: HCPCS

## 2020-10-14 PROCEDURE — 36569 INSJ PICC 5 YR+ W/O IMAGING: CPT

## 2020-10-14 PROCEDURE — 80053 COMPREHEN METABOLIC PANEL: CPT

## 2020-10-14 PROCEDURE — 94640 AIRWAY INHALATION TREATMENT: CPT

## 2020-10-14 RX ORDER — SODIUM CHLORIDE 0.9 % (FLUSH) 0.9 %
10 SYRINGE (ML) INJECTION PRN
Status: DISCONTINUED | OUTPATIENT
Start: 2020-10-14 | End: 2020-10-16 | Stop reason: HOSPADM

## 2020-10-14 RX ORDER — LIDOCAINE HYDROCHLORIDE 10 MG/ML
5 INJECTION, SOLUTION EPIDURAL; INFILTRATION; INTRACAUDAL; PERINEURAL ONCE
Status: COMPLETED | OUTPATIENT
Start: 2020-10-14 | End: 2020-10-14

## 2020-10-14 RX ORDER — LISINOPRIL 10 MG/1
10 TABLET ORAL DAILY
Status: DISCONTINUED | OUTPATIENT
Start: 2020-10-14 | End: 2020-10-16

## 2020-10-14 RX ORDER — SODIUM CHLORIDE 0.9 % (FLUSH) 0.9 %
10 SYRINGE (ML) INJECTION EVERY 12 HOURS SCHEDULED
Status: DISCONTINUED | OUTPATIENT
Start: 2020-10-14 | End: 2020-10-16 | Stop reason: HOSPADM

## 2020-10-14 RX ADMIN — OXCARBAZEPINE 300 MG: 150 TABLET, FILM COATED ORAL at 10:00

## 2020-10-14 RX ADMIN — IPRATROPIUM BROMIDE AND ALBUTEROL 1 PUFF: 20; 100 SPRAY, METERED RESPIRATORY (INHALATION) at 08:46

## 2020-10-14 RX ADMIN — INSULIN LISPRO 1 UNITS: 100 INJECTION, SOLUTION INTRAVENOUS; SUBCUTANEOUS at 12:45

## 2020-10-14 RX ADMIN — ENOXAPARIN SODIUM 40 MG: 40 INJECTION SUBCUTANEOUS at 23:07

## 2020-10-14 RX ADMIN — METHOCARBAMOL TABLETS 500 MG: 500 TABLET, COATED ORAL at 16:09

## 2020-10-14 RX ADMIN — CHLORPROMAZINE HYDROCHLORIDE 100 MG: 100 TABLET, SUGAR COATED ORAL at 10:00

## 2020-10-14 RX ADMIN — Medication 10 ML: at 23:08

## 2020-10-14 RX ADMIN — CARVEDILOL 25 MG: 25 TABLET, FILM COATED ORAL at 18:43

## 2020-10-14 RX ADMIN — ENOXAPARIN SODIUM 40 MG: 40 INJECTION SUBCUTANEOUS at 10:01

## 2020-10-14 RX ADMIN — Medication 10 ML: at 23:07

## 2020-10-14 RX ADMIN — Medication 10 ML: at 10:01

## 2020-10-14 RX ADMIN — METHOCARBAMOL TABLETS 500 MG: 500 TABLET, COATED ORAL at 10:00

## 2020-10-14 RX ADMIN — CHLORPROMAZINE HYDROCHLORIDE 100 MG: 100 TABLET, SUGAR COATED ORAL at 23:08

## 2020-10-14 RX ADMIN — CARVEDILOL 25 MG: 25 TABLET, FILM COATED ORAL at 10:00

## 2020-10-14 RX ADMIN — SENNOSIDES 17.2 MG: 8.6 TABLET, FILM COATED ORAL at 18:43

## 2020-10-14 RX ADMIN — LIDOCAINE HYDROCHLORIDE 5 ML: 10 INJECTION, SOLUTION EPIDURAL; INFILTRATION; INTRACAUDAL; PERINEURAL at 09:28

## 2020-10-14 RX ADMIN — INSULIN LISPRO 1 UNITS: 100 INJECTION, SOLUTION INTRAVENOUS; SUBCUTANEOUS at 18:41

## 2020-10-14 RX ADMIN — PANTOPRAZOLE SODIUM 40 MG: 40 TABLET, DELAYED RELEASE ORAL at 07:42

## 2020-10-14 RX ADMIN — OXCARBAZEPINE 300 MG: 150 TABLET, FILM COATED ORAL at 23:07

## 2020-10-14 RX ADMIN — METHOCARBAMOL TABLETS 500 MG: 500 TABLET, COATED ORAL at 23:07

## 2020-10-14 RX ADMIN — IPRATROPIUM BROMIDE AND ALBUTEROL 1 PUFF: 20; 100 SPRAY, METERED RESPIRATORY (INHALATION) at 20:56

## 2020-10-14 RX ADMIN — DEXAMETHASONE 6 MG: 4 TABLET ORAL at 10:00

## 2020-10-14 RX ADMIN — LISINOPRIL 10 MG: 10 TABLET ORAL at 10:00

## 2020-10-14 RX ADMIN — OXCARBAZEPINE 300 MG: 150 TABLET, FILM COATED ORAL at 16:09

## 2020-10-14 RX ADMIN — REMDESIVIR 100 MG: 100 INJECTION, POWDER, LYOPHILIZED, FOR SOLUTION INTRAVENOUS at 11:34

## 2020-10-14 ASSESSMENT — PAIN SCALES - WONG BAKER
WONGBAKER_NUMERICALRESPONSE: 0

## 2020-10-14 ASSESSMENT — PAIN SCALES - GENERAL
PAINLEVEL_OUTOF10: 0

## 2020-10-14 NOTE — PROCEDURES
Midline ordered per MD see comments under order. TRIMMABLE POWER MIDLINE PROCEDURE NOTE  Chart reviewed for allergies, diagnosis, labs, known contraindications, reason for line placement and planned length of treatment. Insertion procedure discussed with patient/family member. Informed consent not required for Midline placement. Three patient identifiers - Patient name,   and MRN -  completed &  confirmed verbally. Hat, mask and eye shield donned. Midline site scrubbed with Chloraprep  One-Step applicator  for 30 seconds x 1. Hand Hygiene  performed with 3% Chlorhexidine surgical scrub x1 min prior to  Sterile gloves, sterile gown being donned. Patient draped using maximal sterile barrier technique ( head to toe ). Midline site scrubbed a 2nd time with Chloraprep One-Step applicator x 30 sec. Vein located by [facility] Sound and site marked with sterile pen. Midline inserted. Positive brisk blood return obtained Midline flushed with 10 mls  0.9% Sterile Sodium Chloride. Midline flushes easily with no resistance. Valve placed on all lumens followed by Alcohol Swab Caps on end of each. Skin prep applied to site. Bio-patch in place. Catheter secured with non-sutured locking device per hospital protocol. Sterile Tegaderm applied over Midline site. Sterile field maintained during procedure. Guide wire accounted for post procedure. Pt. Response to procedure, tolerated well. Appearance of site: Clean dry and intact without bleeding or edema. All edges of Tegaderm occlusive. Site marked with date and initials of RN placing line. Top 2 side rails in up position, call button in reach, RN notified of all of the above. A Trimmable Power Midline cut at 12 CM placed in the PEREZ brachial vein. 0 cm showing from insertion site.

## 2020-10-14 NOTE — CARE COORDINATION
Case Management Assessment           Daily Note                 Date/ Time of Note: 10/14/2020 3:56 PM         Note completed by: Eric Chavez    Patient Name: Iglesia Rodríguez  YOB: 1955    Diagnosis:Sepsis [facility]) [A41.9]  Sepsis [facility]) [A41.9]  Patient Admission Status: Inpatient    Date of Admission:10/6/2020  9:22 PM Length of Stay: 7 GLOS:      Current Plan of Care: continues remdesivir, on 4L O2  ________________________________________________________________________________________  PT AM-PAC: 18 / 24 per last evaluation on:     OT AM-PAC:   / 24 per last evaluation on:     DME Needs for discharge: defer  ________________________________________________________________________________________  Discharge Plan: [address] [place] [address] ([facility]): DR BING CROWELL [facility]    Tentative discharge date: TBD    Current barriers to discharge: medical clearance      ________________________________________________________________________________________  Case Management Notes: Patient is from [facility], can return to [address] [place] [address] when medically stable. Kala Clifford and his family were provided with choice of provider; he and his family are in agreement with the discharge plan.     Care Transition Patient: No    Eric Chavez RN  [facility]  Case Management Department  Ph: [phone]  Fax: [phone]

## 2020-10-14 NOTE — PROGRESS NOTES
Hospitalist Progress Note      PCP: Minal Owen MD    Date of Admission: 10/6/2020    Chief Complaint: Altered mental status    Hospital Course: Patient admitted for sepsis and acute metabolic encephalopathy sec to COVID. IV abx discontinued. ID following. Subjective:     Access lost overnight, was not able to get new IV, remdesivir not completed. His oxygen requirement has been remaining stable at around 5 L, down to 4 L this am. More awake compared to yesterday. Continues to deny any complaints. Was able to speak with his sister earlier this week.      Medications:  Reviewed    Infusion Medications    dextrose       Scheduled Medications    lidocaine 1 % injection  5 mL Intradermal Once    sodium chloride flush  10 mL Intravenous 2 times per day    chlorproMAZINE  100 mg Oral BID    pantoprazole  40 mg Oral Daily    remdesivir IVPB  100 mg Intravenous Q24H    carvedilol  25 mg Oral BID WC    methocarbamol  500 mg Oral TID    senna  2 tablet Oral QPM    enoxaparin  40 mg Subcutaneous BID    dexamethasone  6 mg Oral Daily    insulin lispro  0-6 Units Subcutaneous TID WC    insulin lispro  0-3 Units Subcutaneous Nightly    OXcarbazepine  300 mg Oral TID    albuterol-ipratropium  1 puff Inhalation BID    sodium chloride flush  10 mL Intravenous 2 times per day     PRN Meds: sodium chloride flush, sodium chloride, dextromethorphan-guaiFENesin, hydrocortisone, polyvinyl alcohol, glucose, dextrose, glucagon (rDNA), dextrose, albuterol-ipratropium, sodium chloride flush, potassium chloride, magnesium sulfate, acetaminophen **OR** acetaminophen      Intake/Output Summary (Last 24 hours) at 10/14/2020 0735  Last data filed at 10/14/2020 7916  Gross per 24 hour   Intake 480 ml   Output --   Net 480 ml       Physical Exam Performed:    BP (!) 153/97   Pulse 63   Temp 98.5 °F (36.9 °C) (Oral)   Resp 18   Ht 5' 10\" (1.778 m)   Wt 227 lb 8 oz (103.2 kg)   SpO2 99%   BMI 32.64 kg/m²     General Several small airspace opacities in bilateral upper lobes may be part of    atelectasis versus early pneumonia. XR CHEST PORTABLE   Final Result   1. Left basilar airspace disease, infrahilar atelectasis. 2. Groundglass density in the right lung is predominantly secondary to poor inspiratory effort and is noted at the overlap of the anterior and posterior ribs. Further clarification with noncontrast computed tomography is suggested. Assessment/Plan:    Sepsis secondary to COVID19  Procalcitonin elevated, initially on vancomycin and meropenem x 4 days, as well as acyclovir for possible meningitis, discontinued  Cx NGTD  CSF suggestive of aseptic lymphocytic meningitis. Meningitis encephalitis panel negative. CTPA negative for PE. +small air space opacities  -order for midline or PICC, patient to complete last dose yesterday and today Remdesivir  -Continues on Decadron  -Pulmonology following, appreciate recs.  He continues on Remdesivir.   -daily labs including D-dimer - continue to follow for A/C     Acute metabolic encephalopathy, much better but continues to wax and wane somewhat  Likely secondary to above as well as underlying schizophrenia     JOSÉ on CKD 3, resolved  Monitor creatinine  S/p gentle IV fluids     CAD/chronic systolic heart failure/hypertension:  Add back lisinopril   Coreg resumed     Sick sinus syndrome:  Status post pacemaker placement     Diabetes mellitus type 2:  Monitor blood glucose  On carb controlled diet and sliding scale insulin     JONATHAN:  Continue nightly BiPAP/oxygen     Paranoid schizophrenia:  Resumed oxcarbazepine and chlorpromazine  Holding olanzapine for now as patient still confused at times     DVT Prophylaxis: Lovenox 40 mg BID  Diet: Carb Control   Code Status: Full Code     PT/OT Eval Status: Ordered     Dispo -inpatient    [place], DO

## 2020-10-14 NOTE — PLAN OF CARE
Problem: Gas Exchange - Impaired  Goal: Promote optimal lung function  Outcome: Ongoing   Pt able to maintain respiration between 16-18 beats per minute, oxygen saturation greater then 90% with supplemental oxygen. Pt sleeping comfortably in bed . Will continue to monitor. Problem: Body Temperature -  Risk of, Imbalanced  Goal: Ability to maintain a body temperature within defined limits  Outcome: Ongoing  Vital signs are stable. Afebrile. Will  continue to monitor. Problem: Confusion - Acute:  Goal: Absence of continued neurological deterioration signs and symptoms  Description: Absence of continued neurological deterioration signs and symptoms  Outcome: Ongoing   Pt is alert, oriented to self only. Pt does have slurred speech and his response to questions is delayed. Will monitor.

## 2020-10-14 NOTE — PROGRESS NOTES
Pt lost his peripheral IV, and several attempts were made by RN's to get another IV but unsuccessful. Pt unable to complete his IV remdesivir. The resident contacted for  an ultrasound guided IV. Will continue to monitor.

## 2020-10-14 NOTE — PROGRESS NOTES
IV access lost overnight. 2 RNs attempted to regain IV access and failed after multiple attempts. On call resident paged to attempt [address] guided IV. On call resident did not attempt US guided IV and sent ICU RN to attempt access without success.

## 2020-10-14 NOTE — PROGRESS NOTES
NUTRITION NOTE   Admission Date: 10/6/2020     Type and Reason for Visit: Initial    NUTRITION RECOMMENDATIONS:   1. PO Diet: Continue current carb control diet       NUTRITION ASSESSMENT:  Pt admitted with sepsis and acute metabolic encephalopathy 2/2 COVID. Has PMH of CAD, HTN, DM, and CKD3 and has an altered mental status noted this admission. Wt appears stable per EMR. RN reports pt has good appetite and consistently consumes 100% of 3 meals a day. RN has no nutritional concerns for this pt. Will continue to monitor per [facility]. MALNUTRITION ASSESSMENT  Context of Malnutrition: Acute Illness   Malnutrition Status: No malnutrition    NUTRITION DIAGNOSIS   Problem: Problem #1: No nutrition diagnosis at this time      [facility] and/or Nutrient Delivery:Continue Current diet   Nutrition education/counseling/coordination of care: Continue Inpatient Monitoring     NUTRITION RISK LEVEL: Risk Level: Low        The patient will still be monitored per nutrition standards of care. Consult dietitian if nutrition interventions essential to patient care is needed.      [address] [facility] [address]:  [phone]  Office:  [phone]

## 2020-10-14 NOTE — PLAN OF CARE
Problem: Falls - Risk of:  Goal: Will remain free from falls  Description: Will remain free from falls  Outcome: Met This Shift     Problem: Falls - Risk of:  Goal: Absence of physical injury  Description: Absence of physical injury  Outcome: Met This Shift     Problem: Confusion - Acute:  Goal: Absence of continued neurological deterioration signs and symptoms  Description: Absence of continued neurological deterioration signs and symptoms  Outcome: Met This Shift     Problem: Injury - Risk of, Physical Injury:  Goal: Will remain free from falls  Description: Will remain free from falls  Outcome: Met This Shift     Problem: Injury - Risk of, Physical Injury:  Goal: Absence of physical injury  Description: Absence of physical injury  Outcome: Met This Shift

## 2020-10-15 LAB
A/G RATIO: 0.9 (ref 1.1–2.2)
ALBUMIN SERPL-MCNC: 3.2 G/DL (ref 3.4–5)
ALP BLD-CCNC: 59 U/L (ref 40–129)
ALT SERPL-CCNC: 24 U/L (ref 10–40)
ANION GAP SERPL CALCULATED.3IONS-SCNC: 12 MMOL/L (ref 3–16)
AST SERPL-CCNC: 16 U/L (ref 15–37)
BASOPHILS ABSOLUTE: 0.1 K/UL (ref 0–0.2)
BASOPHILS RELATIVE PERCENT: 1.5 %
BILIRUB SERPL-MCNC: 0.3 MG/DL (ref 0–1)
BILIRUBIN DIRECT: <0.2 MG/DL (ref 0–0.3)
BILIRUBIN, INDIRECT: NORMAL MG/DL (ref 0–1)
BUN BLDV-MCNC: 29 MG/DL (ref 7–20)
C-REACTIVE PROTEIN: 39.3 MG/L (ref 0–5.1)
CALCIUM SERPL-MCNC: 9.8 MG/DL (ref 8.3–10.6)
CHLORIDE BLD-SCNC: 109 MMOL/L (ref 99–110)
CO2: 25 MMOL/L (ref 21–32)
CREAT SERPL-MCNC: 1.2 MG/DL (ref 0.8–1.3)
D DIMER: <200 NG/ML DDU (ref 0–229)
EOSINOPHILS ABSOLUTE: 0 K/UL (ref 0–0.6)
EOSINOPHILS RELATIVE PERCENT: 0.6 %
FERRITIN: 396.5 NG/ML (ref 30–400)
GFR AFRICAN AMERICAN: >60
GFR NON-AFRICAN AMERICAN: >60
GLOBULIN: 3.4 G/DL
GLUCOSE BLD-MCNC: 117 MG/DL (ref 70–99)
GLUCOSE BLD-MCNC: 140 MG/DL (ref 70–99)
GLUCOSE BLD-MCNC: 152 MG/DL (ref 70–99)
GLUCOSE BLD-MCNC: 83 MG/DL (ref 70–99)
GLUCOSE BLD-MCNC: 91 MG/DL (ref 70–99)
HCT VFR BLD CALC: 33.5 % (ref 40.5–52.5)
HEMOGLOBIN: 10.6 G/DL (ref 13.5–17.5)
LYMPHOCYTES ABSOLUTE: 1.4 K/UL (ref 1–5.1)
LYMPHOCYTES RELATIVE PERCENT: 23.6 %
MCH RBC QN AUTO: 29.1 PG (ref 26–34)
MCHC RBC AUTO-ENTMCNC: 31.6 G/DL (ref 31–36)
MCV RBC AUTO: 92.3 FL (ref 80–100)
MONOCYTES ABSOLUTE: 0.6 K/UL (ref 0–1.3)
MONOCYTES RELATIVE PERCENT: 10.8 %
NEUTROPHILS ABSOLUTE: 3.7 K/UL (ref 1.7–7.7)
NEUTROPHILS RELATIVE PERCENT: 63.5 %
PDW BLD-RTO: 15.2 % (ref 12.4–15.4)
PERFORMED ON: ABNORMAL
PERFORMED ON: NORMAL
PLATELET # BLD: 282 K/UL (ref 135–450)
PMV BLD AUTO: 9.1 FL (ref 5–10.5)
POTASSIUM REFLEX MAGNESIUM: 4.3 MMOL/L (ref 3.5–5.1)
RBC # BLD: 3.63 M/UL (ref 4.2–5.9)
SODIUM BLD-SCNC: 146 MMOL/L (ref 136–145)
TOTAL PROTEIN: 6.6 G/DL (ref 6.4–8.2)
WBC # BLD: 5.9 K/UL (ref 4–11)

## 2020-10-15 PROCEDURE — 94640 AIRWAY INHALATION TREATMENT: CPT

## 2020-10-15 PROCEDURE — 82728 ASSAY OF FERRITIN: CPT

## 2020-10-15 PROCEDURE — 6360000002 HC RX W HCPCS: Performed by: INTERNAL MEDICINE

## 2020-10-15 PROCEDURE — 2060000000 HC ICU INTERMEDIATE R&B

## 2020-10-15 PROCEDURE — 86140 C-REACTIVE PROTEIN: CPT

## 2020-10-15 PROCEDURE — 85025 COMPLETE CBC W/AUTO DIFF WBC: CPT

## 2020-10-15 PROCEDURE — 94761 N-INVAS EAR/PLS OXIMETRY MLT: CPT

## 2020-10-15 PROCEDURE — 2580000003 HC RX 258: Performed by: INTERNAL MEDICINE

## 2020-10-15 PROCEDURE — 80053 COMPREHEN METABOLIC PANEL: CPT

## 2020-10-15 PROCEDURE — 6370000000 HC RX 637 (ALT 250 FOR IP): Performed by: INTERNAL MEDICINE

## 2020-10-15 PROCEDURE — 85379 FIBRIN DEGRADATION QUANT: CPT

## 2020-10-15 RX ADMIN — PANTOPRAZOLE SODIUM 40 MG: 40 TABLET, DELAYED RELEASE ORAL at 07:51

## 2020-10-15 RX ADMIN — CHLORPROMAZINE HYDROCHLORIDE 100 MG: 100 TABLET, SUGAR COATED ORAL at 08:43

## 2020-10-15 RX ADMIN — Medication 10 ML: at 21:55

## 2020-10-15 RX ADMIN — SENNOSIDES 17.2 MG: 8.6 TABLET, FILM COATED ORAL at 18:54

## 2020-10-15 RX ADMIN — OXCARBAZEPINE 300 MG: 150 TABLET, FILM COATED ORAL at 21:55

## 2020-10-15 RX ADMIN — ENOXAPARIN SODIUM 40 MG: 40 INJECTION SUBCUTANEOUS at 21:55

## 2020-10-15 RX ADMIN — INSULIN LISPRO 1 UNITS: 100 INJECTION, SOLUTION INTRAVENOUS; SUBCUTANEOUS at 18:56

## 2020-10-15 RX ADMIN — METHOCARBAMOL TABLETS 500 MG: 500 TABLET, COATED ORAL at 21:55

## 2020-10-15 RX ADMIN — IPRATROPIUM BROMIDE AND ALBUTEROL 1 PUFF: 20; 100 SPRAY, METERED RESPIRATORY (INHALATION) at 08:25

## 2020-10-15 RX ADMIN — LISINOPRIL 10 MG: 10 TABLET ORAL at 08:45

## 2020-10-15 RX ADMIN — OXCARBAZEPINE 300 MG: 150 TABLET, FILM COATED ORAL at 15:21

## 2020-10-15 RX ADMIN — CARVEDILOL 25 MG: 25 TABLET, FILM COATED ORAL at 08:45

## 2020-10-15 RX ADMIN — OXCARBAZEPINE 300 MG: 150 TABLET, FILM COATED ORAL at 08:45

## 2020-10-15 RX ADMIN — CARVEDILOL 25 MG: 25 TABLET, FILM COATED ORAL at 18:54

## 2020-10-15 RX ADMIN — METHOCARBAMOL TABLETS 500 MG: 500 TABLET, COATED ORAL at 08:45

## 2020-10-15 RX ADMIN — METHOCARBAMOL TABLETS 500 MG: 500 TABLET, COATED ORAL at 15:21

## 2020-10-15 RX ADMIN — DEXAMETHASONE 6 MG: 4 TABLET ORAL at 08:43

## 2020-10-15 RX ADMIN — ENOXAPARIN SODIUM 40 MG: 40 INJECTION SUBCUTANEOUS at 08:45

## 2020-10-15 ASSESSMENT — PAIN SCALES - WONG BAKER

## 2020-10-15 ASSESSMENT — PAIN SCALES - GENERAL
PAINLEVEL_OUTOF10: 0

## 2020-10-15 NOTE — PROGRESS NOTES
Hospitalist Progress Note      PCP: Carmen Desai MD    Date of Admission: 10/6/2020    Chief Complaint: Altered mental status    Hospital Course: Patient admitted for sepsis and acute metabolic encephalopathy sec to COVID. IV abx discontinued. ID following. Subjective:     Sleepy, not saying much, not very cooperative with exam today, not wanting to move arms but then pulls blanket up by himself. Medications:  Reviewed    Infusion Medications    dextrose       Scheduled Medications    sodium chloride flush  10 mL Intravenous 2 times per day    lisinopril  10 mg Oral Daily    chlorproMAZINE  100 mg Oral BID    pantoprazole  40 mg Oral Daily    carvedilol  25 mg Oral BID WC    methocarbamol  500 mg Oral TID    senna  2 tablet Oral QPM    enoxaparin  40 mg Subcutaneous BID    dexamethasone  6 mg Oral Daily    insulin lispro  0-6 Units Subcutaneous TID WC    insulin lispro  0-3 Units Subcutaneous Nightly    OXcarbazepine  300 mg Oral TID    albuterol-ipratropium  1 puff Inhalation BID    sodium chloride flush  10 mL Intravenous 2 times per day     PRN Meds: sodium chloride flush, sodium chloride, dextromethorphan-guaiFENesin, hydrocortisone, polyvinyl alcohol, glucose, dextrose, glucagon (rDNA), dextrose, albuterol-ipratropium, sodium chloride flush, potassium chloride, magnesium sulfate, acetaminophen **OR** acetaminophen      Intake/Output Summary (Last 24 hours) at 10/15/2020 1714  Last data filed at 10/15/2020 0901  Gross per 24 hour   Intake 560 ml   Output --   Net 560 ml       Physical Exam Performed:    BP (!) 154/104   Pulse 60   Temp 97.6 °F (36.4 °C) (Axillary)   Resp 18   Ht 5' 10\" (1.778 m)   Wt 227 lb 8 oz (103.2 kg)   SpO2 93%   BMI 32.64 kg/m²     General appearance:  No distress. Obese  HEENT:  Normal cephalic, atraumatic without obvious deformity. Pupils equal, round, and reactive to light. Extra ocular muscles intact. Conjunctivae/corneas clear.   Neck: Supple, with full range of motion. No jugular venous distention. Trachea midline. Respiratory:  Normal respiratory effort. Clear to auscultation, bilaterally without Rales/Wheezes/Rhonchi. Exam limited by body habitus. Cardiovascular:  Regular rate and rhythm with normal S1/S2 without murmurs, rubs or gallops. Abdomen: Soft, obese, non-tender, non-distended with normal bowel sounds. Musculoskeletal:  No clubbing, cyanosis or edema bilaterally. Full range of motion without deformity. Skin: Skin color, texture, turgor normal.  No rashes or lesions. Neurologic:  Alert and oriented x3. Follow commands. grossly non-focal.  Psychiatric:  Awake but lethargic appearance. Capillary Refill: Brisk,< 3 seconds   Peripheral Pulses: +2 palpable, equal bilaterally       Labs:   Recent Labs     10/13/20  0531 10/14/20  0726 10/15/20  0618   WBC 6.4 7.4 5.9   HGB 10.5* 10.5* 10.6*   HCT 33.6* 33.3* 33.5*    249 282     Recent Labs     10/13/20  0531 10/14/20  0726 10/15/20  0618    145 146*   K 5.0 4.4 4.3   * 112* 109   CO2 23 26 25   BUN 26* 32* 29*   CREATININE 1.2 1.4* 1.2   CALCIUM 9.6 9.7 9.8     Recent Labs     10/13/20  0531 10/14/20  0726 10/15/20  0618   AST 16 17 16   ALT 26 29 24   BILIDIR <0.2 <0.2 <0.2   BILITOT <0.2 <0.2 0.3   ALKPHOS 58 56 59     No results for input(s): INR in the last 72 hours. No results for input(s): Zachary Clap in the last 72 hours.     Urinalysis:      Lab Results   Component Value Date    NITRU Negative 10/06/2020    WBCUA None seen 10/06/2020    BACTERIA Rare 08/08/2019    RBCUA 3-4 10/06/2020    BLOODU TRACE-INTACT 10/06/2020    SPECGRAV 1.025 10/06/2020    GLUCOSEU Negative 10/06/2020    GLUCOSEU NEGATIVE 01/01/2010       Radiology:  CT HEAD WO CONTRAST   Final Result   No acute intracranial findings or substantial change      CT CHEST WO CONTRAST   Final Result   Several small airspace opacities in bilateral upper lobes may be part of    atelectasis versus early pneumonia. XR CHEST PORTABLE   Final Result   1. Left basilar airspace disease, infrahilar atelectasis. 2. Groundglass density in the right lung is predominantly secondary to poor inspiratory effort and is noted at the overlap of the anterior and posterior ribs. Further clarification with noncontrast computed tomography is suggested. Assessment/Plan:    Sepsis secondary to COVID19  Procalcitonin elevated, initially on vancomycin and meropenem x 4 days, as well as acyclovir for possible meningitis, discontinued  Cx NGTD  CSF suggestive of aseptic lymphocytic meningitis. Meningitis encephalitis panel negative. CTPA negative for PE. +small air space opacities  -order for midline or PICC, patient to complete last dose yesterday and today Remdesivir  -Continues on Decadron  -Pulmonology following, appreciate recs.  He continues on Remdesivir.   -daily labs including D-dimer - continue to follow for A/C     Acute metabolic encephalopathy, better but continues to wax and wane somewhat  Likely secondary to above as well as underlying schizophrenia     JOSÉ on CKD 3, resolved  Monitor creatinine  S/p gentle IV fluids     CAD/chronic systolic heart failure/hypertension:  lisinopril   Coreg resumed     Sick sinus syndrome:  Status post pacemaker placement     Diabetes mellitus type 2:  Monitor blood glucose  On carb controlled diet and sliding scale insulin     JONATHAN:  Continue nightly BiPAP/oxygen     Paranoid schizophrenia:  Resumed oxcarbazepine and chlorpromazine  Holding olanzapine for now as patient still confused at times     DVT Prophylaxis: Lovenox 40 mg BID  Diet: Carb Control   Code Status: Full Code     PT/OT Eval Status: Ordered     Dispo -inpatient    Yolande Closs, DO

## 2020-10-15 NOTE — PLAN OF CARE
Problem: Confusion - Acute:  Goal: Absence of continued neurological deterioration signs and symptoms  Description: Absence of continued neurological deterioration signs and symptoms  10/14/2020 1858 by Horacio Leon RN  Outcome: Ongoing:    Pt seems to be more interactive and responded to simple questions during this shift. O2 requirement is down to Liters. Vital signs are stable, afebrile. Will continue on current plan.

## 2020-10-15 NOTE — CARE COORDINATION
Case Management Assessment           Daily Note                 Date/ Time of Note: 10/15/2020 3:56 PM         Note completed by: Carlton Abraham    Patient Name: Liban Yepez  YOB: 1955    Diagnosis:Sepsis [facility]) [A41.9]  Sepsis [facility]) [A41.9]  Patient Admission Status: Inpatient    Date of Admission:10/6/2020  9:22 PM Length of Stay: 8 GLOS:      Current Plan of Care:  3L O2 and weaning  ________________________________________________________________________________________  PT AM-PAC: 18 / 24 per last evaluation on: 10.7    OT AM-PAC:   / 24 per last evaluation on:     DME Needs for discharge: defer  ________________________________________________________________________________________  Discharge Plan: [address] [facility] ([facility]): DR BING CROWELL [facility]    Tentative discharge date: TBD    Current barriers to discharge: medical clearance      ________________________________________________________________________________________  Case Management Notes:  Patient is from [address] [place] [address] at [facility], can return when medically stable. Johnella Dance and his family were provided with choice of provider; he and his family are in agreement with the discharge plan.     Care Transition Patient: Ingrid Abraham RN  [facility]  Case Management Department  Ph: [phone]  Fax: [phone]

## 2020-10-16 VITALS
HEART RATE: 60 BPM | DIASTOLIC BLOOD PRESSURE: 90 MMHG | OXYGEN SATURATION: 95 % | HEIGHT: 70 IN | TEMPERATURE: 98.4 F | WEIGHT: 232.2 LBS | SYSTOLIC BLOOD PRESSURE: 135 MMHG | BODY MASS INDEX: 33.24 KG/M2 | RESPIRATION RATE: 18 BRPM

## 2020-10-16 LAB
A/G RATIO: 0.8 (ref 1.1–2.2)
ALBUMIN SERPL-MCNC: 3.1 G/DL (ref 3.4–5)
ALP BLD-CCNC: 61 U/L (ref 40–129)
ALT SERPL-CCNC: 24 U/L (ref 10–40)
ANION GAP SERPL CALCULATED.3IONS-SCNC: 9 MMOL/L (ref 3–16)
AST SERPL-CCNC: 17 U/L (ref 15–37)
BASOPHILS ABSOLUTE: 0 K/UL (ref 0–0.2)
BASOPHILS RELATIVE PERCENT: 0.2 %
BILIRUB SERPL-MCNC: 0.3 MG/DL (ref 0–1)
BILIRUBIN DIRECT: <0.2 MG/DL (ref 0–0.3)
BILIRUBIN, INDIRECT: NORMAL MG/DL (ref 0–1)
BUN BLDV-MCNC: 29 MG/DL (ref 7–20)
C-REACTIVE PROTEIN: 21.1 MG/L (ref 0–5.1)
CALCIUM SERPL-MCNC: 9.9 MG/DL (ref 8.3–10.6)
CHLORIDE BLD-SCNC: 107 MMOL/L (ref 99–110)
CO2: 26 MMOL/L (ref 21–32)
CREAT SERPL-MCNC: 1.1 MG/DL (ref 0.8–1.3)
D DIMER: <200 NG/ML DDU (ref 0–229)
EOSINOPHILS ABSOLUTE: 0.1 K/UL (ref 0–0.6)
EOSINOPHILS RELATIVE PERCENT: 1.8 %
FERRITIN: 345.8 NG/ML (ref 30–400)
GFR AFRICAN AMERICAN: >60
GFR NON-AFRICAN AMERICAN: >60
GLOBULIN: 3.7 G/DL
GLUCOSE BLD-MCNC: 136 MG/DL (ref 70–99)
GLUCOSE BLD-MCNC: 75 MG/DL (ref 70–99)
HCT VFR BLD CALC: 36.2 % (ref 40.5–52.5)
HEMOGLOBIN: 11.5 G/DL (ref 13.5–17.5)
LYMPHOCYTES ABSOLUTE: 1.5 K/UL (ref 1–5.1)
LYMPHOCYTES RELATIVE PERCENT: 24 %
MCH RBC QN AUTO: 29.1 PG (ref 26–34)
MCHC RBC AUTO-ENTMCNC: 31.7 G/DL (ref 31–36)
MCV RBC AUTO: 91.8 FL (ref 80–100)
MONOCYTES ABSOLUTE: 0.6 K/UL (ref 0–1.3)
MONOCYTES RELATIVE PERCENT: 10.5 %
NEUTROPHILS ABSOLUTE: 3.8 K/UL (ref 1.7–7.7)
NEUTROPHILS RELATIVE PERCENT: 63.5 %
PDW BLD-RTO: 15.7 % (ref 12.4–15.4)
PERFORMED ON: ABNORMAL
PLATELET # BLD: 286 K/UL (ref 135–450)
PMV BLD AUTO: 8.9 FL (ref 5–10.5)
POTASSIUM REFLEX MAGNESIUM: 4.5 MMOL/L (ref 3.5–5.1)
RBC # BLD: 3.94 M/UL (ref 4.2–5.9)
SODIUM BLD-SCNC: 142 MMOL/L (ref 136–145)
TOTAL PROTEIN: 6.8 G/DL (ref 6.4–8.2)
WBC # BLD: 6.1 K/UL (ref 4–11)

## 2020-10-16 PROCEDURE — 94761 N-INVAS EAR/PLS OXIMETRY MLT: CPT

## 2020-10-16 PROCEDURE — 85025 COMPLETE CBC W/AUTO DIFF WBC: CPT

## 2020-10-16 PROCEDURE — 80053 COMPREHEN METABOLIC PANEL: CPT

## 2020-10-16 PROCEDURE — 2700000000 HC OXYGEN THERAPY PER DAY

## 2020-10-16 PROCEDURE — 6360000002 HC RX W HCPCS: Performed by: INTERNAL MEDICINE

## 2020-10-16 PROCEDURE — 6370000000 HC RX 637 (ALT 250 FOR IP): Performed by: INTERNAL MEDICINE

## 2020-10-16 PROCEDURE — 82728 ASSAY OF FERRITIN: CPT

## 2020-10-16 PROCEDURE — 85379 FIBRIN DEGRADATION QUANT: CPT

## 2020-10-16 PROCEDURE — 86140 C-REACTIVE PROTEIN: CPT

## 2020-10-16 PROCEDURE — 97530 THERAPEUTIC ACTIVITIES: CPT

## 2020-10-16 PROCEDURE — 94640 AIRWAY INHALATION TREATMENT: CPT

## 2020-10-16 RX ORDER — METHOCARBAMOL 500 MG/1
500 TABLET, FILM COATED ORAL 3 TIMES DAILY PRN
Qty: 30 TABLET | Refills: 0 | Status: SHIPPED
Start: 2020-10-16

## 2020-10-16 RX ORDER — LISINOPRIL 20 MG/1
20 TABLET ORAL DAILY
Qty: 30 TABLET | Refills: 3 | Status: ON HOLD
Start: 2020-10-17 | End: 2020-11-05 | Stop reason: HOSPADM

## 2020-10-16 RX ORDER — DEXAMETHASONE 6 MG/1
6 TABLET ORAL DAILY
Qty: 2 TABLET | Refills: 0
Start: 2020-10-17 | End: 2020-10-19

## 2020-10-16 RX ORDER — LISINOPRIL 20 MG/1
20 TABLET ORAL DAILY
Status: DISCONTINUED | OUTPATIENT
Start: 2020-10-17 | End: 2020-10-16 | Stop reason: HOSPADM

## 2020-10-16 RX ADMIN — CARVEDILOL 25 MG: 25 TABLET, FILM COATED ORAL at 17:53

## 2020-10-16 RX ADMIN — LISINOPRIL 10 MG: 10 TABLET ORAL at 09:21

## 2020-10-16 RX ADMIN — CARVEDILOL 25 MG: 25 TABLET, FILM COATED ORAL at 09:19

## 2020-10-16 RX ADMIN — DEXAMETHASONE 6 MG: 4 TABLET ORAL at 09:20

## 2020-10-16 RX ADMIN — OXCARBAZEPINE 300 MG: 150 TABLET, FILM COATED ORAL at 14:32

## 2020-10-16 RX ADMIN — PANTOPRAZOLE SODIUM 40 MG: 40 TABLET, DELAYED RELEASE ORAL at 11:38

## 2020-10-16 RX ADMIN — IPRATROPIUM BROMIDE AND ALBUTEROL 1 PUFF: 20; 100 SPRAY, METERED RESPIRATORY (INHALATION) at 09:11

## 2020-10-16 RX ADMIN — OXCARBAZEPINE 300 MG: 150 TABLET, FILM COATED ORAL at 09:21

## 2020-10-16 RX ADMIN — CHLORPROMAZINE HYDROCHLORIDE 100 MG: 100 TABLET, SUGAR COATED ORAL at 11:39

## 2020-10-16 RX ADMIN — METHOCARBAMOL TABLETS 500 MG: 500 TABLET, COATED ORAL at 09:21

## 2020-10-16 RX ADMIN — METHOCARBAMOL TABLETS 500 MG: 500 TABLET, COATED ORAL at 14:33

## 2020-10-16 RX ADMIN — ENOXAPARIN SODIUM 40 MG: 40 INJECTION SUBCUTANEOUS at 09:19

## 2020-10-16 ASSESSMENT — PAIN SCALES - GENERAL
PAINLEVEL_OUTOF10: 0

## 2020-10-16 ASSESSMENT — PAIN SCALES - WONG BAKER
WONGBAKER_NUMERICALRESPONSE: 0

## 2020-10-16 NOTE — PROGRESS NOTES
Called report to Courtney MOMIN at pt's facility. RN verbalized understanding of medication changes and follow up appointments.

## 2020-10-16 NOTE — DISCHARGE SUMMARY
Hospital Medicine Discharge Summary    Patient: Hannah Hernandez     Gender: male  : 1955   Age: 76 y.o. MRN: [id number]    Admitting Physician: Jayce Carbone DO  Discharge Physician: Abran Suarez DO    Code Status: Full Code     Admit Date: 10/6/2020   Discharge Date:   10/16/2020    Disposition:  LTC    Discharge Diagnoses: Active Hospital Problems    Diagnosis Date Noted    Pneumonia due to 2019-nCoV [U07.1, J12.89] 10/10/2020    Sepsis ([address] [A41.9] 10/07/2020    Fever [R50.9] 10/07/2020    Encephalopathy [G93.40] 10/07/2020    Aseptic meningitis [G03.0] 10/07/2020    Hypoxia [R09.02] 2019         Outpatient to do list:     1) Follow-up appointments:    Primary care provider  Psychiatry    Condition at Discharge: Stable    Hospital Course:   CC: AMS  Admitted for : Sepsis secondary to COVID19  ID consulted  Procalcitonin elevated, initially on vancomycin and meropenem x 4 days, as well as acyclovir for possible meningitis, discontinued  Cx data NGTD  CSF suggestive of aseptic lymphocytic meningitis. Meningitis encephalitis panel negative. CTPA negative for PE. +small air space opacities  -Pulmonology consulted for increased O2 requirement, s/p treatment with Remdesivir.    -D-dimer low  -Down-trending O2 requirement     Acute metabolic encephalopathy, improved  Likely secondary to Covid19/aseptic meningitis as well as underlying schizophrenia  -have not given Zyprexa but continued other home meds  -recommend make Robaxin PRN     JOSÉ on CKD 3, resolved  Monitor creatinine  S/p gentle IV fluids     CAD/chronic systolic heart failure/hypertension:  Stable  lisinopril   Coreg resumed     Sick sinus syndrome:  Stable  Status post pacemaker placement     Diabetes mellitus type 2:  Monitor blood glucose on decadron, sliding scale if needed     JONATHAN:  Continue nightly BiPAP/oxygen     Paranoid schizophrenia:  Resumed oxcarbazepine and chlorpromazine      Additional findings or notes to primary provider:  None at this time    Discharge Medications:   Current Discharge Medication List      START taking these medications    Details   dexamethasone (DECADRON) 6 MG tablet Take 1 tablet by mouth daily for 2 days  Qty: 2 tablet, Refills: 0           Current Discharge Medication List      CONTINUE these medications which have CHANGED    Details   lisinopril (PRINIVIL;ZESTRIL) 20 MG tablet Take 1 tablet by mouth daily  Qty: 30 tablet, Refills: 3      methocarbamol (ROBAXIN) 500 MG tablet Take 1 tablet by mouth 3 times daily as needed (muscle spasms)  Qty: 30 tablet, Refills: 0           Current Discharge Medication List      CONTINUE these medications which have NOT CHANGED    Details   oxymetazoline (AFRIN) 0.05 % nasal spray 2 sprays by Nasal route as needed for Congestion      chlorproMAZINE (THORAZINE) 100 MG tablet Take 100 mg by mouth 2 times daily      !! NONFORMULARY Lisinopril 20-12.5 mgdaily      polyethylene glycol (MIRALAX) 17 g PACK packet Take 17 g by mouth as needed      !! NONFORMULARY mobic 7.5 daily      OXcarbazepine (TRILEPTAL) 300 MG tablet Take 300 mg by mouth 3 times daily      hypromellose 0.4 % SOLN ophthalmic solution Place 2 drops into both eyes every 4 hours as needed      calcium carbonate (TUMS) 500 MG chewable tablet Take 2 tablets by mouth 2 times daily as needed for Heartburn      !! ipratropium-albuterol (DUONEB) 0.5-2.5 (3) MG/3ML SOLN nebulizer solution Inhale 3 mLs into the lungs every 4 hours as needed for Shortness of Breath  Qty: 360 mL, Refills: 0      !! ipratropium-albuterol (DUONEB) 0.5-2.5 (3) MG/3ML SOLN nebulizer solution Inhale 3 mLs into the lungs 2 times daily  Qty: 360 mL, Refills: 0      fluocinonide (LIDEX) 0.05 % external solution Apply topically daily as needed Apply to scalp      ketoconazole (NIZORAL) 2 % shampoo Apply topically Twice a Week Apply to scalp every night shift on Tues and Saturday -- massage into scalp and leave on for 5-10 min ketoconazole (NIZORAL) 2 % cream Apply topically daily Apply topically to face and ears daily. dextromethorphan-guaiFENesin (MUCINEX DM)  MG per extended release tablet Take 1 tablet by mouth every 12 hours as needed      phenylephrine-cocoa butter (PREPARATION H) 0.25-88.44 % Place 1 suppository rectally every 8 hours as needed for Hemorrhoids       senna (SENOKOT) 8.6 MG tablet Take 2 tablets by mouth every evening      acetaminophen (TYLENOL) 325 MG tablet Take 650 mg by mouth every 6 hours as needed for Pain      trolamine salicylate (ASPERCREME) 10 % cream Apply topically 2 times daily as needed for Pain Apply topically to left shoulder as needed. Omega 3 1000 MG CAPS Take 1 capsule by mouth daily       hydrocortisone 1 % cream Apply topically daily as needed (itchy skin) Apply to face daily as needed for itchy skin      carvedilol (COREG) 25 MG tablet Take 25 mg by mouth 2 times daily (with meals). OLANZapine (ZYPREXA) 20 MG tablet Take 15 mg by mouth 2 times daily        ! ! - Potential duplicate medications found. Please discuss with provider. Current Discharge Medication List          Discharge ROS:  A complete review of systems was asked and negative. Discharge Exam:    BP (!) 172/110   Pulse 60   Temp 98.2 °F (36.8 °C) (Oral)   Resp 18   Ht 5' 10\" (1.778 m)   Wt 232 lb 3.2 oz (105.3 kg)   SpO2 93%   BMI 33.32 kg/m²   General appearance:  NAD Obese  HEENT:   Normal cephalic, atraumatic, moist mucous membranes, no oropharyngeal erythema or exudate  Neck: Supple, trachea midline, no anterior cervical or SC LAD  Heart[de-identified] Normal s1/s2, RRR, no murmurs, gallops, or rubs. No leg edema  Lungs:  Clear to auscultation bilaterally, no wheeze, rales or rhonchi, no use of accessory musclesNormal respiratory effort. Clear to auscultation, bilaterally without Rales/Wheezes/Rhonchi.   Abdomen: Soft, non-tender, non-distended, bowel sounds present, no masses  Musculoskeletal:  No by Danette Jose M.D. on 10-07-20 at 9867    No acute intracranial findings or substantial change    Ct Chest Wo Contrast    Result Date: 10/7/2020  Patient: Heather Haynes  Time Out: 00:35 Exam(s): CT CHEST Without Contrast  EXAM:   CT Chest Without Intravenous Contrast  CLINICAL HISTORY:  pneumonia. TECHNIQUE:   Axial computed tomography images of the chest without intravenous contrast.  CTDI is 18.27 mGy and DLP is 625.69 mGy-cm. All CT scans at this facility use dose modulation, iterative reconstruction, and/or weight based dosing when appropriate to reduce radiation dose to as low as reasonably achievable. COMPARISON:   No relevant prior studies available. FINDINGS:   Lungs:  Small amount of peripheral atelectasis bilaterally. Small patchy airspace opacity in right upper lobe on series 605 image 56 and left upper lobe on series 602 image 34-40, can be part of atelectasis versus early pneumonia. Pleural space:  Unremarkable. No pneumothorax. No significant effusion. Heart: Moderate cardiomegaly with pacer leads in place. No significant pericardial effusion. Bones/joints:  Moderate degenerative changes. Mild upper lobe thoracic scoliosis convexed to the left. Soft tissues:  Unremarkable. Vasculature:  Unremarkable. No thoracic aortic aneurysm. Lymph nodes:  Unremarkable. No enlarged lymph nodes. Electronically signed by Danette Jose M.D. on 10-07-20 at 0693    Several small airspace opacities in bilateral upper lobes may be part of atelectasis versus early pneumonia. Xr Chest Portable    Result Date: 10/6/2020  Chest AP portable at 22 9 INDICATIONS: Fever Comparison with 9/7/2019 Trachea is midline Aorta is tortuous and ectatic The heart is enlarged size Left subclavian dual chamber pacing electrodes, stable.  Hazy groundglass airspace disease in the right lung is noted however, inspiratory effort is only to the eighth rib patient's body habitus combined with poor inspiratory effort creates hazy lung densities and bronchovascular crowding Left lung is unchanged with basilar airspace disease, opacity and small air bronchograms redemonstrated in the infrahilar segment No evidence of effusion. No peripheral septal thickening     1. Left basilar airspace disease, infrahilar atelectasis. 2. Groundglass density in the right lung is predominantly secondary to poor inspiratory effort and is noted at the overlap of the anterior and posterior ribs. Further clarification with noncontrast computed tomography is suggested. The patient was seen and examined on day of discharge and this discharge summary is in conjunction with any daily progress note from day of discharge. Time Spent on discharge is more than 30 minutes in the examination, evaluation, counseling and review of medications and discharge plan. Paloma Harmon DO   10/16/2020      Thank you Owen Alva MD for the opportunity to be involved in this patient's care. If you have any questions or concerns please feel free to contact me at [employer]serve.

## 2020-10-16 NOTE — DISCHARGE INSTR - COC
10/06/20 10/06/20 COVID-19 (Ordered)   10/09/20 Rule-Out Test Resulted            Nurse Assessment:  Last Vital Signs: BP (!) 172/110   Pulse 60   Temp 98.2 °F (36.8 °C) (Oral)   Resp 18   Ht 5' 10\" (1.778 m)   Wt 232 lb 3.2 oz (105.3 kg)   SpO2 93%   BMI 33.32 kg/m²     Last documented pain score (0-10 scale): Pain Level: 0  Last Weight:   Wt Readings from Last 1 Encounters:   10/16/20 232 lb 3.2 oz (105.3 kg)     Mental Status:  disoriented    IV Access:  - None    Nursing Mobility/ADLs:  Walking   Assisted  Transfer  Assisted  Bathing  Assisted  Dressing  Assisted  Toileting  Assisted  Feeding  Assisted  Med Admin  Assisted  Med Delivery   whole    Wound Care Documentation and Therapy:        Elimination:  Continence:   · Bowel: No  · Bladder: No  Urinary Catheter: None   Colostomy/Ileostomy/Ileal Conduit: No       Date of Last BM: 10/16/2020    Intake/Output Summary (Last 24 hours) at 10/16/2020 1020  Last data filed at 10/15/2020 2155  Gross per 24 hour   Intake 130 ml   Output --   Net 130 ml     I/O last 3 completed shifts: In: 550 [P.O.:540; I.V.:10]  Out: -     Safety Concerns: At Risk for Falls    Impairments/Disabilities:      None    Nutrition Therapy:  Current Nutrition Therapy:   - Oral Diet:  Carb Control 3 carbs/meal (1500kcals/day)    Routes of Feeding: Oral  Liquids: Thin Liquids  Daily Fluid Restriction: no  Last Modified Barium Swallow with Video (Video Swallowing Test): not done    Treatments at the Time of Hospital Discharge:   Respiratory Treatments: prednisone  Oxygen Therapy:  is on oxygen at 3 L/min per nasal cannula.   Ventilator:    - No ventilator support    Rehab Therapies: Physical Therapy and Occupational Therapy  Weight Bearing Status/Restrictions: No weight bearing restirctions  Other Medical Equipment (for information only, NOT a DME order):  walker  Other Treatments: none    Patient's personal belongings (please select all that are sent with patient):  None    RN

## 2020-10-16 NOTE — CARE COORDINATION
Case Management Assessment            Discharge Note                    Date / Time of Note: 10/16/2020 11:11 AM                  Discharge Note Completed by: Allan Lechuga    Patient Name: Adolfo Alamo   YOB: 1955  Diagnosis: Sepsis ([facility] [A41.9]  Sepsis ([facility] [A41.9]   Date / Time: 10/6/2020  9:22 PM    Current PCP: Lauren Bender MD  Clinic patient: No    Hospitalization in the last 30 days: No    Advance Directives:  Code Status: Full Code  [place] DNR form completed and on chart: Not Indicated    Financial:  Payor: MEDICARE / Plan: MEDICARE PART A AND B / Product Type: *No Product type* /      Pharmacy:    [facility] [address] [place] [address] [address]  [address] [place] [address]  Phone: [phone] Fax: [phone]    [id number] [address] [phone] - F [phone]  [facility] [address]  Phone: [phone] Fax: [phone]      Assistance purchasing medications?: Potential Assistance Purchasing Medications: No  Assistance provided by Case Management: None at this time    Does patient want to participate in local refill/ meds to beds program?: No    Meds To Beds General Rules:  1. Can ONLY be done Monday- Friday between 8:30am-5pm  2. Prescription(s) must be in pharmacy by 3pm to be filled same day  3. Copy of patient's insurance/ prescription drug card and patient face sheet must be sent along with the prescription(s)  4. Cost of Rx cannot be added to hospital bill. If financial assistance is needed, please contact unit  or ;  or  CANNOT provide pharmacy voucher for patients co-pays  5.  Patients can then  the prescription on their way out of the hospital at discharge, or pharmacy can deliver to the bedside if staff is available. (payment due at time of pick-up or delivery - cash, check, or card accepted)     Able to afford home medications/ co-pay costs: Yes    ADLS:  Current PT AM-PAC Score: 18 /24  Current OT AM-PAC Score:   /24      DISCHARGE Disposition: Amber (LTC): [facility] [phone]  Phone: [phone]  Fax: [phone]    LOC at discharge: [place] Completed: Yes    Notification completed in HENS/PAS?:  Not Applicable    IMM Completed:   Yes, Case management has presented and reviewed IMM letter #2 to the patient and/or family/ POA. Patient and/or family/POA verbalized understanding of their medicare rights and appeal process if needed. Patient and/or family/POA has signed, initialed and placed today's date (10.16.20) and time ([phone]) on IMM letter #2 on the the appropriate lines. Patient and/or family/POA, copy of letter offered and they are aware that this original copy of IMM letter #2 is available prior to discharge from the paper chart on the unit. Electronic documentation has been entered into epic for IMM letter #2 and original paper copy has been added to the paper chart at the nurses station.      Transportation:  Transportation PLAN for discharge: EMS transportation   Mode of Transport: Ambulance stretcher - BLS  Reason for medical transport: Special handling en route- isolation precautions  Name of [address]: [address]  Phone: [phone]  Time of Transport: 1800    Transport form completed: Yes    Home Care:  [address] ordered at discharge: Not [address]: Not Applicable  Orders faxed: No    Durable Medical Equipment:  DME Provider: n/a  Equipment obtained during hospitalization: defer to [place] and Respiratory Equipment:  Oxygen needed at discharge?: Yes  [address]: Not Applicable  Portable tank available for discharge?: Not Indicated    Dialysis:  Dialysis patient: No    Dialysis Center:  Not Applicable    Hospice Services:  Location: Not Applicable  Agency: Not Applicable    Consents signed: Not Indicated    Referrals made at [facility] for outpatient continued care:  Not Applicable    Additional CM Notes: Patient will be returning to [facility]. CM spoke with Tobin Artie at the facility is aware of pt return. Orders faxed to [phone], nurse to call report to [phone]. Patient is scheduled for transport via [address] at 1800. CM updated pt's sister Chetan Perrin over the phone. The Plan for Transition of Care is related to the following treatment goals of Sepsis ([address] [A41.9]  Sepsis ([facility] [address] [A41.9]    The Patient and/or patient representative Ailyn Sanchez and his family were provided with a choice of provider and agrees with the discharge plan Yes    Freedom of choice list was provided with basic dialogue that supports the patient's individualized plan of care/goals and shares the quality data associated with the providers.  Yes    Care Transitions patient: No    Andrés Lechuga RN  [facility]  Case Management Department  Ph: [phone]  Fax: [phone]

## 2020-10-16 NOTE — PLAN OF CARE
Problem: Falls - Risk of:  Goal: Will remain free from falls  Description: Will remain free from falls  Outcome: Met This Shift  Goal: Absence of physical injury  Description: Absence of physical injury  Outcome: Met This Shift     Problem: Confusion - Acute:  Goal: Absence of continued neurological deterioration signs and symptoms  Description: Absence of continued neurological deterioration signs and symptoms  Outcome: Not Met This Shift  Goal: Mental status will be restored to baseline  Description: Mental status will be restored to baseline  Outcome: Not Met This Shift     Problem: Discharge Planning:  Goal: Ability to perform activities of daily living will improve  Description: Ability to perform activities of daily living will improve  Outcome: Not Met This Shift  Goal: Participates in care planning  Description: Participates in care planning  Outcome: Not Met This Shift     Problem: Injury - Risk of, Physical Injury:  Goal: Will remain free from falls  Description: Will remain free from falls  Outcome: Met This Shift  Goal: Absence of physical injury  Description: Absence of physical injury  Outcome: Met This Shift     Problem: Mood - Altered:  Goal: Mood stable  Description: Mood stable  Outcome: Met This Shift  Goal: Absence of abusive behavior  Description: Absence of abusive behavior  Outcome: Met This Shift  Goal: Verbalizations of feeling emotionally comfortable while being cared for will increase  Description: Verbalizations of feeling emotionally comfortable while being cared for will increase  Outcome: Met This Shift     Problem: Psychomotor Activity - Altered:  Goal: Absence of psychomotor disturbance signs and symptoms  Description: Absence of psychomotor disturbance signs and symptoms  Outcome: Met This Shift     Problem: Sensory Perception - Impaired:  Goal: Demonstrations of improved sensory functioning will increase  Description: Demonstrations of improved sensory functioning will increase  Outcome: Not Met This Shift  Goal: Decrease in sensory misperception frequency  Description: Decrease in sensory misperception frequency  Outcome: Not Met This Shift  Goal: Able to refrain from responding to false sensory perceptions  Description: Able to refrain from responding to false sensory perceptions  Outcome: Not Met This Shift  Goal: Demonstrates accurate environmental perceptions  Description: Demonstrates accurate environmental perceptions  Outcome: Not Met This Shift  Goal: Able to distinguish between reality-based and nonreality-based thinking  Description: Able to distinguish between reality-based and nonreality-based thinking  Outcome: Met This Shift  Goal: Able to interrupt nonreality-based thinking  Description: Able to interrupt nonreality-based thinking  Outcome: Not Met This Shift     Problem: Sleep Pattern Disturbance:  Goal: Appears well-rested  Description: Appears well-rested  Outcome: Not Met This Shift     Problem: Skin Integrity:  Goal: Will show no infection signs and symptoms  Description: Will show no infection signs and symptoms  Outcome: Not Met This Shift  Goal: Absence of new skin breakdown  Description: Absence of new skin breakdown  Outcome: Met This Shift     Problem: Nutritional:  Goal: Nutritional status will improve  Description: Nutritional status will improve  Outcome: Not Met This Shift     Problem: Physical Regulation:  Goal: Diagnostic test results will improve  Description: Diagnostic test results will improve  Outcome: Not Met This Shift     Problem: Respiratory:  Goal: Ability to maintain normal respiratory secretions will improve  Description: Ability to maintain normal respiratory secretions will improve  Outcome: Not Met This Shift     Problem: Airway Clearance - Ineffective  Goal: Achieve or maintain patent airway  Outcome: Met This Shift     Problem: Gas Exchange - Impaired  Goal: Absence of hypoxia  Outcome: Not Met This Shift  Goal: Promote optimal lung function  Outcome: Not Met This Shift     Problem: Breathing Pattern - Ineffective  Goal: Ability to achieve and maintain a regular respiratory rate  Outcome: Met This Shift     Problem:  Body Temperature -  Risk of, Imbalanced  Goal: Ability to maintain a body temperature within defined limits  Outcome: Met This Shift  Goal: Will regain or maintain usual level of consciousness  Outcome: Not Met This Shift  Goal: Complications related to the disease process, condition or treatment will be avoided or minimized  Outcome: Met This Shift     Problem: Isolation Precautions - Risk of Spread of Infection  Goal: Prevent transmission of infection  Outcome: Met This Shift     Problem: Nutrition Deficits  Goal: Optimize nutrtional status  Outcome: Not Met This Shift     Problem: Risk for Fluid Volume Deficit  Goal: Maintain normal heart rhythm  Outcome: Met This Shift  Goal: Maintain absence of muscle cramping  Outcome: Met This Shift  Goal: Maintain normal serum potassium, sodium, calcium, phosphorus, and pH  Outcome: Met This Shift     Problem: Fatigue  Goal: Verbalize increase energy and improved vitality  Outcome: Not Met This Shift     Problem: Patient Education: Go to Patient Education Activity  Goal: Patient/Family Education  Outcome: Not Met This Shift

## 2020-10-16 NOTE — PROGRESS NOTES
Physical Therapy    Daily Treatment Note / Discharge Summary      Discharge Recommendations:  Hannah Hernandez scored a 18/24 on the AM-PAC short mobility form. Current research shows that an AM-PAC score of 18 or greater is typically associated with a discharge to the patient's home setting. Based on the patient's AM-PAC score and their current functional mobility deficits, it is recommended that the patient have 2-3 sessions per week of Physical Therapy at d/c to increase the patient's independence. At this time, this patient demonstrates the endurance and safety to discharge to prior living situation. Please see assessment section for further patient specific details. Assessment:  Pt's ability to participate in PT is limited by lethargy and baseline cognition. Unable to follow commands consistently. Increased time for processing commands needed. Pt participated in bed to chair transfer, but returned to bed at end of session for safety. Pt is a long term resident of [facility] and plans to return upon d/c. No further acute PT needs identified. Pt would benefit from PT at [facility] if not at functional baseline. Will sign off. Equipment Needs:  None    Chart Reviewed: Yes     Other position/activity restrictions: Up as tolerated   Additional Pertinent Hx: Pt to ED 10/6 with confusion and lethargy. Pt admitted for sepsis. Chest CT: atelectasis versus early pneumonia. Head CT (-). PMH:  HTN, DM, CAD, R BBB, paranoid schizophrenia, pacemaker, JONATHAN, GERD, depression, Anemia      Diagnosis: Sepsis   Treatment Diagnosis: Decreased gait secondary to sepsis. Subjective:  Pt found supine in bed, eating breakfast.  Pt answering most questions with \"Yes\". Pt lethargic and needing increased time for processing.       Pain:  Denies      Objective:    Bed mobility  Supine to sit:  SBA (Max verbal cues, HOB raised)  Sit to Supine:  Independent     Transfers  Sit to stand:  CGA (from EOB to walker, twice)  Stand to sit:  CGA (cues for safety and hand placement)  Bed <> chair:  Min A (stand step pivot with walker)    Ambulation  Assistance Level:  Min A   Assistive device:  Rolling walker  Distance:  3ft x2  Quality of gait:  Lethargic, assist for walker management, ongoing cues  Other:  O2    Neuro/balance  Sitting balance:  SBA on EOB  Static stance:  CGA with walker  Dynamic stance:  Min A with walker for transfers    Patient Education  Role of PT. No learning noted due to cognition. Safety Devices  Pt left with needs in reach, supine in bed with alarm in place and RN aware.            AM-PAC score  AM-PAC Inpatient Mobility Raw Score : 18  AM-PAC Inpatient T-Scale Score : 43.63  Mobility Inpatient CMS 0-100% Score: 46.58  Mobility Inpatient CMS G-Code Modifier : CK    Goals:  Time Frame for Short term goals: Discharge  Short term goal 1: supine <> sit independent (partially met 10/16/2020 )  Short term goal 2: sit <> stand supervision (not met)  Short term goal 3: ambulate 100ft with LRAD and supervision (d/c current goal)    Plan:  Discharge acute PT       Therapy Time    Individual  Concurrent  Group  Co-treatment    Time In  0954            Time Out  1017            Minutes  23              Timed Code Treatment Minutes:  23  Total Treatment Minutes:  23        Kb Mcintyre PT

## 2020-10-27 NOTE — ED PROVIDER NOTES
[address] ENCOUNTER          PHYSICIAN ASSISTANT NOTE       Date of evaluation: 10/6/2020    Chief Complaint     Altered Mental Status; Fever; and Cough      History of Present Illness     Basilio Reyna is a 76 y.o. male with a past medical history as noted below who presents to the Emergency Department with a complaint of altered mental status, report of fever and cough. The patient was sent to the emergency department via EMS from [facility]. There, he was recently found to be less interactive, increasingly lethargic and somnolent. Reportedly at baseline the patient is responsive and conversational and somewhat ambulatory. The patient reports he is not certain why he was sent to the hospital.  He describes the possibility of fever and chills, but denies other complaints. He is somewhat of a poor historian. The skilled facility reports that this is an acute change from yesterday. He was found to be febrile, tachypneic, tachycardic and hypotensive. With concern for sepsis, he was transferred to the emergency department for evaluation. No known exposures to patients with COVID-19 or under investigation for the disorder. He has not left the facility except to go to the hospital.    Review of Systems     Review of Systems   Unable to perform ROS: Mental status change   Constitutional: Positive for chills, diaphoresis and fever. Psychiatric/Behavioral: Positive for confusion. Past Medical, Surgical, Family, and Social History     He has a past medical history of Anemia, CAD (coronary artery disease), Depression, Diabetes mellitus ([address] GERD (gastroesophageal reflux disease), Hypertension, Left anterior fascicular block, JONATHAN (obstructive sleep apnea), Pacemaker, Paranoid schizophrenia ([address] Pneumonia due to 2019-nCoV, Rash, Right bundle branch block, Sinus bradycardia, and Sinusitis.   He has a past surgical history that includes pacemaker placement. His family history includes No Known Problems in his father and mother. He reports that he has never smoked. He has never used smokeless tobacco. He reports that he does not drink alcohol or use drugs. Medications     Discharge Medication List as of 10/16/2020  5:26 PM      CONTINUE these medications which have NOT CHANGED    Details   oxymetazoline (AFRIN) 0.05 % nasal spray 2 sprays by Nasal route as needed for CongestionHistorical Med      chlorproMAZINE (THORAZINE) 100 MG tablet Take 100 mg by mouth 2 times dailyHistorical Med      !! NONFORMULARY Lisinopril 20-12.5 mgdailyHistorical Med      polyethylene glycol (MIRALAX) 17 g PACK packet Take 17 g by mouth as neededHistorical Med      !! NONFORMULARY mobic 7.5 dailyHistorical Med      OXcarbazepine (TRILEPTAL) 300 MG tablet Take 300 mg by mouth 3 times dailyHistorical Med      hypromellose 0.4 % SOLN ophthalmic solution Place 2 drops into both eyes every 4 hours as neededHistorical Med      calcium carbonate (TUMS) 500 MG chewable tablet Take 2 tablets by mouth 2 times daily as needed for HeartburnHistorical Med      !! ipratropium-albuterol (DUONEB) 0.5-2.5 (3) MG/3ML SOLN nebulizer solution Inhale 3 mLs into the lungs every 4 hours as needed for Shortness of Breath, Disp-360 mL, R-0DC to SNF      !! ipratropium-albuterol (DUONEB) 0.5-2.5 (3) MG/3ML SOLN nebulizer solution Inhale 3 mLs into the lungs 2 times daily, Disp-360 mL, R-0DC to SNF      fluocinonide (LIDEX) 0.05 % external solution Apply topically daily as needed Apply to scalp, Topical, DAILY PRN, Historical Med      ketoconazole (NIZORAL) 2 % shampoo Apply topically Twice a Week Apply to scalp every night shift on Tues and Saturday -- massage into scalp and leave on for 5-10 min, Topical, TWICE WEEKLY, Historical Med      ketoconazole (NIZORAL) 2 % cream Apply topically daily Apply topically to face and ears daily. , Topical, DAILY, Historical Med      dextromethorphan-guaiFENesin (MUCINEX DM)  MG per extended release tablet Take 1 tablet by mouth every 12 hours as neededHistorical Med      phenylephrine-cocoa butter (PREPARATION H) 0.25-88.44 % Place 1 suppository rectally every 8 hours as needed for Hemorrhoids Historical Med      senna (SENOKOT) 8.6 MG tablet Take 2 tablets by mouth every eveningHistorical Med      acetaminophen (TYLENOL) 325 MG tablet Take 650 mg by mouth every 6 hours as needed for PainHistorical Med      trolamine salicylate (ASPERCREME) 10 % cream Apply topically 2 times daily as needed for Pain Apply topically to left shoulder as needed. , Topical, 2 TIMES DAILY PRN, Historical Med      Omega 3 1000 MG CAPS Take 1 capsule by mouth daily Historical Med      hydrocortisone 1 % cream Apply topically daily as needed (itchy skin) Apply to face daily as needed for itchy skin, Topical, DAILY PRN, Historical Med      carvedilol (COREG) 25 MG tablet Take 25 mg by mouth 2 times daily (with meals). OLANZapine (ZYPREXA) 20 MG tablet Take 15 mg by mouth 2 times daily Historical Med       !! - Potential duplicate medications found. Please discuss with provider. Allergies     He is allergic to cephalosporins; lorazepam; and pcn [penicillins]. Physical Exam     INITIAL VITALS: BP: 135/86, Temp: 102.5 °F (39.2 °C), Pulse: 82, Resp: 28, SpO2: 92 %  Physical Exam  Vitals signs and nursing note reviewed. Constitutional:       General: He is not in acute distress. Appearance: He is well-developed. He is obese. He is ill-appearing. He is not diaphoretic. HENT:      Head: Normocephalic and atraumatic. Eyes:      General: No visual field deficit or scleral icterus. Extraocular Movements:      Right eye: Normal extraocular motion and no nystagmus. Left eye: Normal extraocular motion and no nystagmus. Pupils: Pupils are equal, round, and reactive to light. Pupils are equal.      Right eye: Pupil is reactive. Left eye: Pupil is reactive. posterior ribs. Further clarification with noncontrast computed tomography is suggested. LABS:   Results for orders placed or performed during the hospital encounter of 10/06/20   Culture, Blood 1    Specimen: Blood   Result Value Ref Range    Blood Culture, Routine No Growth after 4 days of incubation. Culture, Blood 2    Specimen: Blood   Result Value Ref Range    Culture, Blood 2 No Growth after 4 days of incubation. Culture, CSF    Specimen: CSF   Result Value Ref Range    Gram Stain Result No organisms seen WBC's (Mononuclear)     Organism Staphylococcus coagulase-negative (A)     CSF Culture       Isolated from Broth  No further workup  Probable contamination     Meningitis/Encephalitis Panel, CSF    Specimen: CSF   Result Value Ref Range    Meningitis Encephalitis Panel       Meningitis Encephalitis Panel PCR Result: Not Detected  See additional report for complete Meningitis Encephalitis Panel.      Meningitis Encephalitis Panel Report   Result Value Ref Range    Report SEE IMAGE    Lactate, Sepsis   Result Value Ref Range    Lactic Acid, Sepsis 1.7 0.4 - 1.9 mmol/L   Lactate, Sepsis   Result Value Ref Range    Lactic Acid, Sepsis 0.7 0.4 - 1.9 mmol/L   Blood gas, venous   Result Value Ref Range    pH, Ash 7.306 (L) 7.350 - 7.450    pCO2, Ash 57.5 (H) 41.0 - 51.0 mmHg    pO2, Ash 34.5 25.0 - 40.0 mmHg    HCO3, Venous 27.8 24.0 - 28.0 mmol/L    Base Excess, Ash 0.9 -2.0 - 3.0 mmol/L    O2 Sat, Ash 56 Not established %    Carboxyhemoglobin 1.5 0.0 - 1.5 %    MetHgb, Ash 0.8 0.0 - 1.5 %    TC02 (Calc), Ash 30 mmol/L    Hemoglobin, Ash, Reduced 43.40 %   CBC Auto Differential   Result Value Ref Range    WBC 10.3 4.0 - 11.0 K/uL    RBC 4.15 (L) 4.20 - 5.90 M/uL    Hemoglobin 12.2 (L) 13.5 - 17.5 g/dL    Hematocrit 38.0 (L) 40.5 - 52.5 %    MCV 91.5 80.0 - 100.0 fL    MCH 29.4 26.0 - 34.0 pg    MCHC 32.1 31.0 - 36.0 g/dL    RDW 15.1 12.4 - 15.4 %    Platelets 310 428 - 529 K/uL    MPV 9.2 5.0 - 10.5 fL    Neutrophils % 78.2 %    Lymphocytes % 10.1 %    Monocytes % 11.1 %    Eosinophils % 0.1 %    Basophils % 0.5 %    Neutrophils Absolute 8.1 (H) 1.7 - 7.7 K/uL    Lymphocytes Absolute 1.0 1.0 - 5.1 K/uL    Monocytes Absolute 1.2 0.0 - 1.3 K/uL    Eosinophils Absolute 0.0 0.0 - 0.6 K/uL    Basophils Absolute 0.1 0.0 - 0.2 K/uL   Comprehensive Metabolic Panel w/ Reflex to MG   Result Value Ref Range    Sodium 144 136 - 145 mmol/L    Potassium reflex Magnesium 4.6 3.5 - 5.1 mmol/L    Chloride 107 99 - 110 mmol/L    CO2 25 21 - 32 mmol/L    Anion Gap 12 3 - 16    Glucose 169 (H) 70 - 99 mg/dL    BUN 28 (H) 7 - 20 mg/dL    CREATININE 1.7 (H) 0.8 - 1.3 mg/dL    GFR Non- 41 (A) >60    GFR  49 (A) >60    Calcium 9.8 8.3 - 10.6 mg/dL    Total Protein 8.3 (H) 6.4 - 8.2 g/dL    Alb 4.6 3.4 - 5.0 g/dL    Albumin/Globulin Ratio 1.2 1.1 - 2.2    Total Bilirubin <0.2 0.0 - 1.0 mg/dL    Alkaline Phosphatase 86 40 - 129 U/L    ALT 19 10 - 40 U/L    AST 21 15 - 37 U/L    Globulin 3.7 g/dL   Brain Natriuretic Peptide   Result Value Ref Range    Pro-BNP 1,115 (H) 0 - 124 pg/mL   Urinalysis Reflex to Culture    Specimen: Urine, clean catch   Result Value Ref Range    Color, UA Yellow Straw/Yellow    Clarity, UA Clear Clear    Glucose, Ur Negative Negative mg/dL    Bilirubin Urine Negative Negative    Ketones, Urine Negative Negative mg/dL    Specific Gravity, UA 1.025 1.005 - 1.030    Blood, Urine TRACE-INTACT (A) Negative    pH, UA 6.0 5.0 - 8.0    Protein, UA 30 (A) Negative mg/dL    Urobilinogen, Urine 0.2 <2.0 E.U./dL    Nitrite, Urine Negative Negative    Leukocyte Esterase, Urine Negative Negative    Microscopic Examination YES     Urine Type Voided     Urine Reflex to Culture Not Indicated    COVID-19   Result Value Ref Range    SARS-CoV-2, DONELL DETECTED (A) NOT DETECTED   Microscopic Urinalysis   Result Value Ref Range    WBC, UA None seen 0 - 5 /HPF    RBC, UA 3-4 0 - 4 /HPF   Glucose, CSF   Result Value Ref Range    Glucose,  (H) 40 - 80 mg/dL    Appearance, CSF Bloody (A)     Tube Number + CELL CT + DIFF-CSF Tube 1    Protein, CSF   Result Value Ref Range    Protein, CSF >600 (H) 15 - 45 mg/dL   CSF Cell Count with Differential   Result Value Ref Range    Color, CSF Colorless Colorless    Tube Number + CELL CT + DIFF-CSF Tube 4     Clot Evaluation CSF see below     WBC, CSF 32 (H) 0 - 5 /cumm    RBC,  (A) 0 /cumm    Neutrophils, CSF 2 0 - 6 %    Lymphs, CSF 86 (H) 40 - 80 %    Monocytes, CSF 11 (L) 15 - 45 %    Macrophage, CSF 1 %    Number of Cells,     EKG 12 Lead   Result Value Ref Range    Ventricular Rate 85 BPM    Atrial Rate 85 BPM    P-R Interval 168 ms    QRS Duration 234 ms    Q-T Interval 494 ms    QTc Calculation (Bazett) 587 ms    P Axis 56 degrees    R Axis 270 degrees    T Axis 72 degrees    Diagnosis       EKG performed in ER and to be interpreted by ER physician. Confirmed by MD, ER ([id number]),  Callum BOWER), RHEA ([id number]) on 10/7/2020 7:31:20 AM       ED BEDSIDE ULTRASOUND:  N/A    RECENT VITALS:  BP: (!) 135/90, Temp: 98.4 °F (36.9 °C), Pulse: 60, Resp: 18, SpO2: 95 %     Procedures     Lumbar Puncture  Performed by: MONIKA Holloway  Authorized by: Clint Asher MD     Consent:     Consent obtained:  Emergent situation    Consent given by:  Patient    Risks discussed:  Bleeding, infection, nerve damage, pain, headache and repeat procedure  Pre-procedure details:     Procedure purpose:  Diagnostic    Preparation: Patient was prepped and draped in usual sterile fashion    Anesthesia (see MAR for exact dosages):      Anesthesia method:  Local infiltration    Local anesthetic:  Lidocaine 1% WITH epi  Procedure details:     Lumbar space:  L3-L4 interspace    Patient position:  R lateral decubitus    Needle gauge:  20    Needle type:  Spinal needle - Quincke tip    Needle length (in):  3.5    Ultrasound guidance: no      Number of attempts:  2    Opening color/perfusion. Extremity exam shows brisk capillary refill. Peripheral pulses were 2+ in the lower extremities. MEDICAL DECISION MAKING / ASSESSMENT / Garry Miguel is admitted to the Emergency Department for evaluation of his chief complaint as described in the history of present illness. Complete history and physical was performed by me and my attending. Nursing notes, past medical history, surgical history, family history and social history were reviewed and addressed in the HPI. Del Mccord is a 76 y.o. male who presents to the emergency department with a complaint of altered mental status and fever. The patient presents from a skilled nursing facility where he is normally alert and interactive and is ambulatory with assistance. Today the patient is become increasingly lethargic, slow to answer questions or even nonverbal and significantly somnolent. Temperature of 102.4°F at the facility. Noted congestive cough throughout the day. Unknown symptom onset. Patient does not provide good history due to altered mental status. Presents to the emergency department febrile, tachypneic but not tachycardic or hypotensive. Physical examination reveals ill-appearing, obese male with tachypnea, bilateral fine crackles versus rhonchi and fever. The patient demonstrates neck stiffness, though unclear whether this is nuchal rigidity or not. Patient does not answer questions with regard to pain. Chest x-ray performed on patient's arrival which demonstrates left basilar airspace disease with infrahilar atelectasis and groundglass density in the right lung. Patient does not demonstrate profound leukocytosis, but does have white blood cell count on the upper end of normal with neutrophilic dominance. The patient demonstrates a stable, normocytic anemia. His initial lactate 1.7.   The patient's comprehensive metabolic panel demonstrates JOSÉ with creatinine 1.7, a BUN of 28 and GFR below 60. No anion gap or significant change and bicarbonate levels. Blood cultures obtained with concern for sepsis. Initial troponin is negative. Pro-BMP does demonstrate elevation to 1115, which is about double his normal.  VBG demonstrates partially compensated respiratory acidosis. A COVID-19 swab was taken given the possibility of disc disease given his presentation and the patient was placed in contact plus droplet and airborne precautions. Urinalysis demonstrates proteinuria but no other significant finding. A lumbar puncture was performed. The patient demonstrates elevated white blood cell count, lymphocytes, red blood cells, glucose and protein in the CSF. No organisms seen on Gram stain however Staphylococcus coagulase-negative organisms were isolated from the broth which are likely secondary to contamination. IV fluid bolus administration started and the patient was placed on broad-spectrum antibiotics. Repeat lactate was 0.7. We will admit to the hospital for unspecified fever with high likelihood for COVID-19 and septicemia. Patient's case was discussed with the hospitalist physician who will assume care of the patient for continued evaluation and management. I discussed this plan at length the patient who verbalizes understanding and is in agreement. The patient was seen and evaluated by myself and the attending physician, Ammon Velázquez M.D., who agrees with my assessment, treatment and plan. Clinical Impression     1. Fever, unspecified fever cause    2.  Septicemia [facility])        Disposition     DISPOSITION Admitted 10/07/2020 04:23:53 AM                                                                                           [address] [place] [address]  11/09/20 5202

## 2020-10-30 ENCOUNTER — APPOINTMENT (OUTPATIENT)
Dept: GENERAL RADIOLOGY | Age: 65
DRG: 393 | End: 2020-10-30
Payer: MEDICARE

## 2020-10-30 ENCOUNTER — HOSPITAL ENCOUNTER (INPATIENT)
Age: 65
LOS: 6 days | Discharge: NURSING, REHAB OR LONG-TERM CARE | DRG: 393 | End: 2020-11-05
Attending: EMERGENCY MEDICINE | Admitting: INTERNAL MEDICINE
Payer: MEDICARE

## 2020-10-30 PROBLEM — N17.9 AKI (ACUTE KIDNEY INJURY) (HCC): Status: ACTIVE | Noted: 2020-10-30

## 2020-10-30 LAB
ABO/RH: NORMAL
ANION GAP SERPL CALCULATED.3IONS-SCNC: 7 MMOL/L (ref 3–16)
ANION GAP SERPL CALCULATED.3IONS-SCNC: 8 MMOL/L (ref 3–16)
ANTIBODY SCREEN: NORMAL
APTT: 22.1 SEC (ref 24.2–36.2)
BASE EXCESS VENOUS: 3.8 MMOL/L (ref -2–3)
BASOPHILS ABSOLUTE: 0 K/UL (ref 0–0.2)
BASOPHILS RELATIVE PERCENT: 0.5 %
BUN BLDV-MCNC: 18 MG/DL (ref 7–20)
BUN BLDV-MCNC: 18 MG/DL (ref 7–20)
CALCIUM SERPL-MCNC: 9.7 MG/DL (ref 8.3–10.6)
CALCIUM SERPL-MCNC: 9.9 MG/DL (ref 8.3–10.6)
CARBOXYHEMOGLOBIN: 1.7 % (ref 0–1.5)
CHLORIDE BLD-SCNC: 106 MMOL/L (ref 99–110)
CHLORIDE BLD-SCNC: 108 MMOL/L (ref 99–110)
CO2: 28 MMOL/L (ref 21–32)
CO2: 30 MMOL/L (ref 21–32)
CREAT SERPL-MCNC: 1.4 MG/DL (ref 0.8–1.3)
CREAT SERPL-MCNC: 1.5 MG/DL (ref 0.8–1.3)
EKG ATRIAL RATE: 71 BPM
EKG DIAGNOSIS: NORMAL
EKG P AXIS: 79 DEGREES
EKG P-R INTERVAL: 188 MS
EKG Q-T INTERVAL: 506 MS
EKG QRS DURATION: 218 MS
EKG QTC CALCULATION (BAZETT): 549 MS
EKG R AXIS: 265 DEGREES
EKG T AXIS: 55 DEGREES
EKG VENTRICULAR RATE: 71 BPM
EOSINOPHILS ABSOLUTE: 0.2 K/UL (ref 0–0.6)
EOSINOPHILS RELATIVE PERCENT: 3.6 %
GFR AFRICAN AMERICAN: 57
GFR AFRICAN AMERICAN: >60
GFR NON-AFRICAN AMERICAN: 47
GFR NON-AFRICAN AMERICAN: 51
GLUCOSE BLD-MCNC: 100 MG/DL (ref 70–99)
GLUCOSE BLD-MCNC: 107 MG/DL (ref 70–99)
HCO3 VENOUS: 31.8 MMOL/L (ref 24–28)
HCT VFR BLD CALC: 36.1 % (ref 40.5–52.5)
HCT VFR BLD CALC: 36.2 % (ref 40.5–52.5)
HEMOGLOBIN, VEN, REDUCED: 53.6 %
HEMOGLOBIN: 11.2 G/DL (ref 13.5–17.5)
HEMOGLOBIN: 11.5 G/DL (ref 13.5–17.5)
INR BLD: 0.94 (ref 0.86–1.14)
LYMPHOCYTES ABSOLUTE: 1.6 K/UL (ref 1–5.1)
LYMPHOCYTES RELATIVE PERCENT: 29.7 %
MCH RBC QN AUTO: 29.4 PG (ref 26–34)
MCHC RBC AUTO-ENTMCNC: 30.9 G/DL (ref 31–36)
MCV RBC AUTO: 95.2 FL (ref 80–100)
METHEMOGLOBIN VENOUS: 0.7 % (ref 0–1.5)
MONOCYTES ABSOLUTE: 0.6 K/UL (ref 0–1.3)
MONOCYTES RELATIVE PERCENT: 11 %
NEUTROPHILS ABSOLUTE: 3 K/UL (ref 1.7–7.7)
NEUTROPHILS RELATIVE PERCENT: 55.2 %
O2 SAT, VEN: 45 %
PCO2, VEN: 71.3 MMHG (ref 41–51)
PDW BLD-RTO: 16.1 % (ref 12.4–15.4)
PH VENOUS: 7.27 (ref 7.35–7.45)
PLATELET # BLD: 232 K/UL (ref 135–450)
PMV BLD AUTO: 8.8 FL (ref 5–10.5)
PO2, VEN: 29.8 MMHG (ref 25–40)
POC OCCULT BLOOD STOOL: POSITIVE
POTASSIUM REFLEX MAGNESIUM: 5.6 MMOL/L (ref 3.5–5.1)
POTASSIUM SERPL-SCNC: 4.5 MMOL/L (ref 3.5–5.1)
PRO-BNP: 312 PG/ML (ref 0–124)
PROTHROMBIN TIME: 10.9 SEC (ref 10–13.2)
RBC # BLD: 3.81 M/UL (ref 4.2–5.9)
SODIUM BLD-SCNC: 141 MMOL/L (ref 136–145)
SODIUM BLD-SCNC: 146 MMOL/L (ref 136–145)
TCO2 CALC VENOUS: 34 MMOL/L
TROPONIN: <0.01 NG/ML
WBC # BLD: 5.4 K/UL (ref 4–11)

## 2020-10-30 PROCEDURE — 99284 EMERGENCY DEPT VISIT MOD MDM: CPT

## 2020-10-30 PROCEDURE — 2580000003 HC RX 258: Performed by: INTERNAL MEDICINE

## 2020-10-30 PROCEDURE — 2700000000 HC OXYGEN THERAPY PER DAY

## 2020-10-30 PROCEDURE — 94660 CPAP INITIATION&MGMT: CPT

## 2020-10-30 PROCEDURE — 85610 PROTHROMBIN TIME: CPT

## 2020-10-30 PROCEDURE — 83880 ASSAY OF NATRIURETIC PEPTIDE: CPT

## 2020-10-30 PROCEDURE — 86850 RBC ANTIBODY SCREEN: CPT

## 2020-10-30 PROCEDURE — 84484 ASSAY OF TROPONIN QUANT: CPT

## 2020-10-30 PROCEDURE — 6370000000 HC RX 637 (ALT 250 FOR IP): Performed by: INTERNAL MEDICINE

## 2020-10-30 PROCEDURE — C9113 INJ PANTOPRAZOLE SODIUM, VIA: HCPCS | Performed by: INTERNAL MEDICINE

## 2020-10-30 PROCEDURE — 94640 AIRWAY INHALATION TREATMENT: CPT

## 2020-10-30 PROCEDURE — 86900 BLOOD TYPING SEROLOGIC ABO: CPT

## 2020-10-30 PROCEDURE — 85730 THROMBOPLASTIN TIME PARTIAL: CPT

## 2020-10-30 PROCEDURE — 80048 BASIC METABOLIC PNL TOTAL CA: CPT

## 2020-10-30 PROCEDURE — 71045 X-RAY EXAM CHEST 1 VIEW: CPT

## 2020-10-30 PROCEDURE — 36415 COLL VENOUS BLD VENIPUNCTURE: CPT

## 2020-10-30 PROCEDURE — 86901 BLOOD TYPING SEROLOGIC RH(D): CPT

## 2020-10-30 PROCEDURE — 6360000002 HC RX W HCPCS: Performed by: INTERNAL MEDICINE

## 2020-10-30 PROCEDURE — 82272 OCCULT BLD FECES 1-3 TESTS: CPT

## 2020-10-30 PROCEDURE — 85014 HEMATOCRIT: CPT

## 2020-10-30 PROCEDURE — U0003 INFECTIOUS AGENT DETECTION BY NUCLEIC ACID (DNA OR RNA); SEVERE ACUTE RESPIRATORY SYNDROME CORONAVIRUS 2 (SARS-COV-2) (CORONAVIRUS DISEASE [COVID-19]), AMPLIFIED PROBE TECHNIQUE, MAKING USE OF HIGH THROUGHPUT TECHNOLOGIES AS DESCRIBED BY CMS-2020-01-R: HCPCS

## 2020-10-30 PROCEDURE — 82803 BLOOD GASES ANY COMBINATION: CPT

## 2020-10-30 PROCEDURE — 51798 US URINE CAPACITY MEASURE: CPT

## 2020-10-30 PROCEDURE — 85018 HEMOGLOBIN: CPT

## 2020-10-30 PROCEDURE — 85025 COMPLETE CBC W/AUTO DIFF WBC: CPT

## 2020-10-30 PROCEDURE — U0002 COVID-19 LAB TEST NON-CDC: HCPCS

## 2020-10-30 PROCEDURE — 93005 ELECTROCARDIOGRAM TRACING: CPT | Performed by: EMERGENCY MEDICINE

## 2020-10-30 PROCEDURE — 2060000000 HC ICU INTERMEDIATE R&B

## 2020-10-30 RX ORDER — LISINOPRIL AND HYDROCHLOROTHIAZIDE 20; 12.5 MG/1; MG/1
1 TABLET ORAL DAILY
Status: ON HOLD | COMMUNITY
End: 2020-11-05 | Stop reason: HOSPADM

## 2020-10-30 RX ORDER — OXCARBAZEPINE 150 MG/1
300 TABLET, FILM COATED ORAL 3 TIMES DAILY
Status: DISCONTINUED | OUTPATIENT
Start: 2020-10-30 | End: 2020-11-05 | Stop reason: HOSPADM

## 2020-10-30 RX ORDER — IPRATROPIUM BROMIDE AND ALBUTEROL SULFATE 2.5; .5 MG/3ML; MG/3ML
3 SOLUTION RESPIRATORY (INHALATION) EVERY 4 HOURS PRN
Status: DISCONTINUED | OUTPATIENT
Start: 2020-10-30 | End: 2020-11-02

## 2020-10-30 RX ORDER — ACETAMINOPHEN 650 MG/1
650 SUPPOSITORY RECTAL EVERY 6 HOURS PRN
Status: DISCONTINUED | OUTPATIENT
Start: 2020-10-30 | End: 2020-11-05 | Stop reason: HOSPADM

## 2020-10-30 RX ORDER — SODIUM CHLORIDE 9 MG/ML
10 INJECTION INTRAVENOUS EVERY 12 HOURS
Status: DISCONTINUED | OUTPATIENT
Start: 2020-10-30 | End: 2020-11-05 | Stop reason: HOSPADM

## 2020-10-30 RX ORDER — CHLORPROMAZINE HYDROCHLORIDE 25 MG/1
100 TABLET, FILM COATED ORAL 2 TIMES DAILY
Status: DISCONTINUED | OUTPATIENT
Start: 2020-10-30 | End: 2020-11-05 | Stop reason: HOSPADM

## 2020-10-30 RX ORDER — PROMETHAZINE HYDROCHLORIDE 25 MG/1
12.5 TABLET ORAL EVERY 6 HOURS PRN
Status: DISCONTINUED | OUTPATIENT
Start: 2020-10-30 | End: 2020-11-05 | Stop reason: HOSPADM

## 2020-10-30 RX ORDER — PANTOPRAZOLE SODIUM 40 MG/10ML
40 INJECTION, POWDER, LYOPHILIZED, FOR SOLUTION INTRAVENOUS EVERY 12 HOURS
Status: DISCONTINUED | OUTPATIENT
Start: 2020-10-30 | End: 2020-11-05 | Stop reason: HOSPADM

## 2020-10-30 RX ORDER — SODIUM CHLORIDE 9 MG/ML
INJECTION, SOLUTION INTRAVENOUS CONTINUOUS
Status: DISCONTINUED | OUTPATIENT
Start: 2020-10-30 | End: 2020-10-30

## 2020-10-30 RX ORDER — SODIUM CHLORIDE 0.9 % (FLUSH) 0.9 %
10 SYRINGE (ML) INJECTION PRN
Status: DISCONTINUED | OUTPATIENT
Start: 2020-10-30 | End: 2020-11-05 | Stop reason: HOSPADM

## 2020-10-30 RX ORDER — OLANZAPINE 15 MG/1
15 TABLET ORAL 2 TIMES DAILY
Status: DISCONTINUED | OUTPATIENT
Start: 2020-10-30 | End: 2020-11-05 | Stop reason: HOSPADM

## 2020-10-30 RX ORDER — ACETAMINOPHEN 325 MG/1
650 TABLET ORAL EVERY 6 HOURS PRN
Status: DISCONTINUED | OUTPATIENT
Start: 2020-10-30 | End: 2020-11-05 | Stop reason: HOSPADM

## 2020-10-30 RX ORDER — ONDANSETRON 2 MG/ML
4 INJECTION INTRAMUSCULAR; INTRAVENOUS EVERY 6 HOURS PRN
Status: DISCONTINUED | OUTPATIENT
Start: 2020-10-30 | End: 2020-11-05 | Stop reason: HOSPADM

## 2020-10-30 RX ORDER — FUROSEMIDE 10 MG/ML
40 INJECTION INTRAMUSCULAR; INTRAVENOUS ONCE
Status: COMPLETED | OUTPATIENT
Start: 2020-10-30 | End: 2020-10-30

## 2020-10-30 RX ORDER — CARVEDILOL 25 MG/1
25 TABLET ORAL 2 TIMES DAILY WITH MEALS
Status: DISCONTINUED | OUTPATIENT
Start: 2020-10-30 | End: 2020-11-05 | Stop reason: HOSPADM

## 2020-10-30 RX ORDER — IPRATROPIUM BROMIDE AND ALBUTEROL SULFATE 2.5; .5 MG/3ML; MG/3ML
3 SOLUTION RESPIRATORY (INHALATION) 2 TIMES DAILY
Status: DISCONTINUED | OUTPATIENT
Start: 2020-10-30 | End: 2020-10-30

## 2020-10-30 RX ORDER — HYDRALAZINE HYDROCHLORIDE 20 MG/ML
5 INJECTION INTRAMUSCULAR; INTRAVENOUS EVERY 6 HOURS PRN
Status: DISCONTINUED | OUTPATIENT
Start: 2020-10-30 | End: 2020-10-30 | Stop reason: ALTCHOICE

## 2020-10-30 RX ORDER — LABETALOL HYDROCHLORIDE 5 MG/ML
5 INJECTION, SOLUTION INTRAVENOUS EVERY 6 HOURS PRN
Status: DISCONTINUED | OUTPATIENT
Start: 2020-10-30 | End: 2020-11-05 | Stop reason: HOSPADM

## 2020-10-30 RX ORDER — SODIUM CHLORIDE 0.9 % (FLUSH) 0.9 %
10 SYRINGE (ML) INJECTION EVERY 12 HOURS SCHEDULED
Status: DISCONTINUED | OUTPATIENT
Start: 2020-10-30 | End: 2020-11-05 | Stop reason: HOSPADM

## 2020-10-30 RX ORDER — MELOXICAM 7.5 MG/1
7.5 TABLET ORAL DAILY
Status: ON HOLD | COMMUNITY
End: 2020-11-05 | Stop reason: HOSPADM

## 2020-10-30 RX ORDER — METHOCARBAMOL 500 MG/1
500 TABLET, FILM COATED ORAL 3 TIMES DAILY PRN
Status: DISCONTINUED | OUTPATIENT
Start: 2020-10-30 | End: 2020-11-05 | Stop reason: HOSPADM

## 2020-10-30 RX ADMIN — Medication 10 ML: at 20:31

## 2020-10-30 RX ADMIN — PANTOPRAZOLE SODIUM 40 MG: 40 INJECTION, POWDER, FOR SOLUTION INTRAVENOUS at 12:34

## 2020-10-30 RX ADMIN — Medication 10 ML: at 12:38

## 2020-10-30 RX ADMIN — CARVEDILOL 25 MG: 25 TABLET, FILM COATED ORAL at 18:11

## 2020-10-30 RX ADMIN — BISACODYL 10 MG: 5 TABLET, COATED ORAL at 20:31

## 2020-10-30 RX ADMIN — IPRATROPIUM BROMIDE AND ALBUTEROL 1 PUFF: 20; 100 SPRAY, METERED RESPIRATORY (INHALATION) at 19:54

## 2020-10-30 RX ADMIN — FUROSEMIDE 40 MG: 10 INJECTION, SOLUTION INTRAMUSCULAR; INTRAVENOUS at 15:46

## 2020-10-30 RX ADMIN — PANTOPRAZOLE SODIUM 40 MG: 40 INJECTION, POWDER, FOR SOLUTION INTRAVENOUS at 23:39

## 2020-10-30 RX ADMIN — Medication 10 ML: at 12:34

## 2020-10-30 RX ADMIN — SODIUM CHLORIDE: 9 INJECTION, SOLUTION INTRAVENOUS at 13:34

## 2020-10-30 RX ADMIN — Medication 10 ML: at 23:39

## 2020-10-30 RX ADMIN — OXCARBAZEPINE 300 MG: 150 TABLET, FILM COATED ORAL at 20:32

## 2020-10-30 RX ADMIN — CHLORPROMAZINE HYDROCHLORIDE 100 MG: 25 TABLET, SUGAR COATED ORAL at 20:32

## 2020-10-30 RX ADMIN — OLANZAPINE 15 MG: 15 TABLET, FILM COATED ORAL at 20:31

## 2020-10-30 ASSESSMENT — PAIN SCALES - PAIN ASSESSMENT IN ADVANCED DEMENTIA (PAINAD)
BODYLANGUAGE: 0
BREATHING: 0
BODYLANGUAGE: 0
FACIALEXPRESSION: 0
CONSOLABILITY: 0
BODYLANGUAGE: 0
CONSOLABILITY: 0
NEGVOCALIZATION: 0
TOTALSCORE: 0
BODYLANGUAGE: 0
NEGVOCALIZATION: 0
FACIALEXPRESSION: 0
BREATHING: 0
BREATHING: 0
FACIALEXPRESSION: 0
TOTALSCORE: 0
NEGVOCALIZATION: 0
BREATHING: 0
NEGVOCALIZATION: 0
CONSOLABILITY: 0
CONSOLABILITY: 0
FACIALEXPRESSION: 0

## 2020-10-30 ASSESSMENT — PAIN SCALES - GENERAL
PAINLEVEL_OUTOF10: 0

## 2020-10-30 ASSESSMENT — ENCOUNTER SYMPTOMS
VOMITING: 0
WHEEZING: 0
BLOOD IN STOOL: 1
SHORTNESS OF BREATH: 0
EYE PAIN: 0
NAUSEA: 0
ABDOMINAL PAIN: 0
COUGH: 0
DIARRHEA: 0

## 2020-10-30 NOTE — PROGRESS NOTES
4 Eyes Admission Assessment     I agree as the admission nurse that 2 RN's have performed a thorough Head to Toe Skin Assessment on the patient. ALL assessment sites listed below have been assessed on admission. Areas assessed by both nurses: ***  [x]   Head, Face, and Ears   [x]   Shoulders, Back, and Chest  [x]   Arms, Elbows, and Hands   [x]   Coccyx, Sacrum, and Ischium  [x]   Legs, Feet, and Heels        Does the Patient have Skin Breakdown?   Yes a wound was noted on the Admission Assessment and an LDA was Initiated documentation include the Merissa-wound, Wound Assessment, Measurements, Dressing Treatment, Drainage, and Color\",   Redness on face from bipap mask  Scattered abrasions BLE        Leonard Prevention initiated:  Yes   Wound Care Orders initiated:  No      C nurse consulted for Pressure Injury (Stage 3,4, Unstageable, DTI, NWPT, and Complex wounds) or Leonard score 18 or lower:  No      Nurse 1 eSignature: Electronically signed by Lashell Perez RN on 10/30/20 at 12:10 PM EDT    **SHARE this note so that the co-signing nurse is able to place an eSignature**    Nurse 2 eSignature: Electronically signed by Anh Tate RN on 10/30/20 at 12:17 PM EDT

## 2020-10-30 NOTE — ED NOTES
Bed: A10-10  Expected date:   Expected time:   Means of arrival:   Comments:  Medic [id number] Elisha Blackmon, RN  10/30/20 0102

## 2020-10-30 NOTE — H&P
file prior to encounter. Current Outpatient Medications on File Prior to Encounter   Medication Sig Dispense Refill    lisinopril (PRINIVIL;ZESTRIL) 20 MG tablet Take 1 tablet by mouth daily 30 tablet 3    methocarbamol (ROBAXIN) 500 MG tablet Take 1 tablet by mouth 3 times daily as needed (muscle spasms) 30 tablet 0    oxymetazoline (AFRIN) 0.05 % nasal spray 2 sprays by Nasal route as needed for Congestion      chlorproMAZINE (THORAZINE) 100 MG tablet Take 100 mg by mouth 2 times daily      NONFORMULARY Lisinopril \hydrochlorothiazide 20-12.5 mgdaily      polyethylene glycol (MIRALAX) 17 g PACK packet Take 17 g by mouth as needed      NONFORMULARY mobic 7.5 daily      OXcarbazepine (TRILEPTAL) 300 MG tablet Take 300 mg by mouth 3 times daily      hypromellose 0.4 % SOLN ophthalmic solution Place 2 drops into both eyes every 4 hours as needed      calcium carbonate (TUMS) 500 MG chewable tablet Take 2 tablets by mouth 2 times daily as needed for Heartburn      ipratropium-albuterol (DUONEB) 0.5-2.5 (3) MG/3ML SOLN nebulizer solution Inhale 3 mLs into the lungs every 4 hours as needed for Shortness of Breath 360 mL 0    ipratropium-albuterol (DUONEB) 0.5-2.5 (3) MG/3ML SOLN nebulizer solution Inhale 3 mLs into the lungs 2 times daily 360 mL 0    fluocinonide (LIDEX) 0.05 % external solution Apply topically daily as needed Apply to scalp      ketoconazole (NIZORAL) 2 % shampoo Apply topically Twice a Week Apply to scalp every night shift on Tues and Saturday -- massage into scalp and leave on for 5-10 min      ketoconazole (NIZORAL) 2 % cream Apply topically daily Apply topically to face and ears daily.       dextromethorphan-guaiFENesin (MUCINEX DM)  MG per extended release tablet Take 1 tablet by mouth every 12 hours as needed      phenylephrine-cocoa butter (PREPARATION H) 0.25-88.44 % Place 1 suppository rectally every 8 hours as needed for Hemorrhoids       senna (SENOKOT) 8.6 MG tablet Take 2 tablets by mouth every evening      acetaminophen (TYLENOL) 325 MG tablet Take 650 mg by mouth every 6 hours as needed for Pain      trolamine salicylate (ASPERCREME) 10 % cream Apply topically 2 times daily as needed for Pain Apply topically to left shoulder as needed.  Omega 3 1000 MG CAPS Take 1 capsule by mouth daily       hydrocortisone 1 % cream Apply topically daily as needed (itchy skin) Apply to face daily as needed for itchy skin      carvedilol (COREG) 25 MG tablet Take 25 mg by mouth 2 times daily (with meals).  OLANZapine (ZYPREXA) 20 MG tablet Take 15 mg by mouth 2 times daily          Allergies:  Cephalosporins; Lorazepam; and Pcn [penicillins]    Social History:   TOBACCO:   reports that he has never smoked. He has never used smokeless tobacco.     ETOH:   reports no history of alcohol use. Family History:       Problem Relation Age of Onset    No Known Problems Mother     No Known Problems Father        ROS: Review of Systems - Negative except as in HPI although limited by pxt'smental status. .   All other systems reviewed and are negative. PHYSICAL EXAM:  /82   Pulse 67   Temp 98.2 °F (36.8 °C) (Oral)   Resp 25   Ht 5' 10\" (1.778 m)   Wt 233 lb (105.7 kg)   SpO2 100%   BMI 33.43 kg/m²     No results for input(s): POCGLU in the last 72 hours. General appearance:  NAD Obese  HEENT:   Normal cephalic, atraumatic, moist mucous membranes, no oropharyngeal erythema or exudate  Neck: Supple, trachea midline, no anterior cervical or SC LAD  Heart[de-identified] Normal s1/s2, RRR, no murmurs, gallops, or rubs. No leg edema  Lungs:  Diminished breath sounds brenna at bases.   Abdomen: Soft, non-tender, non-distended, bowel sounds present, no masses  Musculoskeletal:  No clubbing, no cyanosis, or edema  Skin: No lesion or masses  Neurologic:  Grossly non-focal.  Psychiatric:  Alert and oriented, thought content appropriate    LABS:  Recent Labs     10/30/20  0803   WBC 5. 4   HGB 11.2*   HCT 36.2*                                                                     Recent Labs     10/30/20  0803      K 5.6*      CO2 28   BUN 18   CREATININE 1.5*   GLUCOSE 107*     No results for input(s): AST, ALT, ALB, BILITOT, ALKPHOS in the last 72 hours. Recent Labs     10/30/20  0803   TROPONINI <0.01     No results for input(s): BNP in the last 72 hours. Lab Results   Component Value Date    PHART 7.471 08/10/2019    CXT6QKF 45.6 08/10/2019    PO2ART 65.8 08/10/2019     Recent Labs     10/30/20  0803   INR 0.94     No results for input(s): NITRITE, COLORU, PHUR, LABCAST, WBCUA, RBCUA, MUCUS, TRICHOMONAS, YEAST, BACTERIA, CLARITYU, SPECGRAV, LEUKOCYTESUR, UROBILINOGEN, BILIRUBINUR, BLOODU, GLUCOSEU, AMORPHOUS in the last 72 hours. Invalid input(s): Ivett Sherwood         Echo 2018:  Normal left ventricle size. There is mild to moderate concentric left   ventricular hypertrophy. Overall left ventricular systolic function appears   moderately reduced with an ejection fraction visually estimated at 30-35%. The apex appears akinetic. Morris septum is hypokinetic. Diastolic filling parameters suggest grade II diastolic dysfunction. Mild mitral regurgitation is present. Trace aortic valve regurgitation. Mild tricuspid regurgitation. Estimated pulmonary artery systolic pressure is 29 mmHg assuming a right   atrial pressure of 8 mmHg. The right atrium is dilated. Pacer / ICD wire is visualized in the right ventricle. Assessment & Plan:    76 y.o. male with past medical history of CAD, hypertension, diabetes mellitus, JONATHAN, paranoid schizophrenia who presented to the ED with rectal bleeding. Acute GI bleed  - Concern for GI bleed  - Pxt is on Mobic  - Red blood noted in Ed per ED physician  - Hgb looks stable c/f recent  - Will keep NPO for now  - Protonix  - Type and screen blood  - Trend hgb  - GI consulted.       Acute on chronic systolic heart failure  - Requiring O2  - Placed on BiPAP in ED.   - Pxt is supposed to use BiPAP but reportedly due to recent COVID status, was not being used in the facility  -  CXR showed Pulmonary vascular congestion without localized consolidation in this patient with low lung volumes. - Does not appear to be on home diuretics, received fluids last admission  - Check pro BNP, monitor  - Dly weights, I/O monitoring  - Update echo  - IV lasix        JOSÉ   - Hold Lisinopril/HCTZ  - Monitor creatinine  - Nephrology consult       CAD/ishcemic cardiomyopathy  - Has ICD  - Hold lisinopril for now  - Continue Coreg resumed  - Telemetry         Sick sinus syndrome:  Status post pacemaker placement       Diabetes mellitus type 2:  Monitor blood glucose , sliding scale if needed       JONATHAN:  Continue nightly BiPAP/oxygen       Paranoid schizophrenia:  Resumed oxcarbazepine and chlorpromazine       Recent COVID PNA with Respiratory failure, Sepsis and Aseptic meningitis  - Was treated with on decadron, remdesivir  - COVId testing done in ED  - supportive care        The patient and / or the family were informed of the results of any tests, a time was given to answer questions, a plan was proposed and they agreed with plan. Thank you Dr. Jonny Carlos MD for the opportunity to be involved in this patients care. If you have any questions or concerns please feel free to contact me at [phone].   Prior    Charis You MD

## 2020-10-30 NOTE — CONSULTS
JOSÉ   GIB   CHF   COVID 19   JONATHAN  CAD     Plan   - Hold ACE/HCTZ   - Ur studies  - COVID pending   - Diuresis as dio   - labs in am     Recommend to dose adjust all medications  based on renal functions  Maintain SBP> 90 mmHg   Daily weights   AVOID NSAIDs  Avoid Nephrotoxins  Monitor Intake/Output  Call if significant decrease in urine output     Elizabeth Scottl     Nephrology Consult Note                                                                                                                                                                                                                                                                                                                                                               Office : [phone]     Fax :[phone]              Patient's Name: Emma Fothergill  9:03 PM  10/31/2020    Reason for Consult: JOSÉ   Requesting Physician:  Minal Owen MD      Chief Complaint:      History of Present Ilness:    76 y. o. male with past medical history of CAD, hypertension, diabetes mellitus, JONATHAN, paranoid schizophrenia who presented to the ED with rectal bleeding. Recently admitted 10/7/2020 to 10/16/2020 for  AMS, and noted with  Sepsis secondary to [address]. Brought to the Ed due to concern for rectal bleeding.   Cr 1.4   Pro       Past Medical History:   Diagnosis Date    Anemia     CAD (coronary artery disease)     Depression     Diabetes mellitus (HCC)     GERD (gastroesophageal reflux disease)     Hypertension     Left anterior fascicular block     JONATHAN (obstructive sleep apnea)     Pacemaker     [facility] ([facility] [address]     Pneumonia due to 2019-nCoV 10/10/2020    Rash     Right bundle branch block     Sinus bradycardia     Sinusitis        Past Surgical History:   Procedure Laterality Date    PACEMAKER PLACEMENT         Family History   Problem Relation Age of Onset    No Known Problems Mother     No Known Problems Father reports that he has never smoked. He has never used smokeless tobacco. He reports that he does not drink alcohol or use drugs.     Allergies:  Cephalosporins; Lorazepam; and Pcn [penicillins]    Current Medications:    sodium chloride flush 0.9 % injection 10 mL, 2 times per day  sodium chloride flush 0.9 % injection 10 mL, PRN  acetaminophen (TYLENOL) tablet 650 mg, Q6H PRN    Or  acetaminophen (TYLENOL) suppository 650 mg, Q6H PRN  promethazine (PHENERGAN) tablet 12.5 mg, Q6H PRN    Or  ondansetron (ZOFRAN) injection 4 mg, Q6H PRN  pantoprazole (PROTONIX) injection 40 mg, Q12H    And  sodium chloride (PF) 0.9 % injection 10 mL, Q12H  carvedilol (COREG) tablet 25 mg, BID WC  chlorproMAZINE (THORAZINE) tablet 100 mg, BID  ipratropium-albuterol (DUONEB) nebulizer solution 3 mL, Q4H PRN  methocarbamol (ROBAXIN) tablet 500 mg, TID PRN  OLANZapine (ZYPREXA) tablet 15 mg, BID  OXcarbazepine (TRILEPTAL) tablet 300 mg, TID  perflutren lipid microspheres (DEFINITY) injection 1.65 mg, ONCE PRN  labetalol (NORMODYNE;TRANDATE) injection 5 mg, Q6H PRN  albuterol-ipratropium (COMBIVENT RESPIMAT)  MCG/ACT inhaler 1 puff, BID  bisacodyl (DULCOLAX) EC tablet 10 mg, 4x Daily  [START ON 11/1/2020] magnesium citrate solution 296 mL, Once        Review of Systems:   14 point ROS obtained but were negative except mentioned in HPI      Physical exam:     Vitals:  /73   Pulse 65   Temp 98.9 °F (37.2 °C) (Oral)   Resp 20   Ht 5' 10\" (1.778 m)   Wt 212 lb 11.9 oz (96.5 kg)   SpO2 96%   BMI 30.53 kg/m²   Constitutional:  OAA X3 NAD  Skin: no rash, turgor wnl  Heent:  eomi, mmm  Neck: no bruits or jvd noted  Cardiovascular:  S1, S2 without m/r/g  Respiratory: CTA B without w/r/r  Abdomen:  +bs, soft, nt, nd  Ext: + lower extremity edema  Psychiatric: mood and affect appropriate  Musculoskeletal:  Rom, muscular strength intact    Data:   Labs:  CBC:   Recent Labs     10/30/20  0803 10/30/20  1700 10/31/20  1150   WBC 5.4  --   --    HGB 11.2* 11.5* 11.6*     --   --      BMP:    Recent Labs     10/30/20  0803 10/30/20  1231 10/31/20  0431    146* 142   K 5.6* 4.5 4.5    108 105   CO2 28 30 30   BUN 18 18 17   CREATININE 1.5* 1.4* 1.4*   GLUCOSE 107* 100* 122*     Ca/Mg/Phos:   Recent Labs     10/30/20  0803 10/30/20  1231 10/31/20  0431   CALCIUM 9.7 9.9 10.2     Hepatic: No results for input(s): AST, ALT, ALB, BILITOT, ALKPHOS in the last 72 hours. Troponin:   Recent Labs     10/30/20  0803   TROPONINI <0.01     BNP: No results for input(s): BNP in the last 72 hours. Lipids: No results for input(s): CHOL, TRIG, HDL, LDLCALC, LABVLDL in the last 72 hours. ABGs: No results for input(s): PHART, PO2ART, RUK2WVU in the last 72 hours. INR:   Recent Labs     10/30/20  0803   INR 0.94     UA:  Recent Labs     10/31/20  1443   COLORU Yellow   CLARITYU SL CLOUDY*   GLUCOSEU Negative   BILIRUBINUR Negative   KETUA Negative   SPECGRAV 1.025   BLOODU LARGE*   PHUR 6.0   PROTEINU TRACE*   UROBILINOGEN 0.2   NITRU Negative   LEUKOCYTESUR SMALL*   LABMICR YES   URINETYPE Voided      Urine Microscopic:   Recent Labs     10/31/20  1443   BACTERIA Rare*   COMU see below   WBCUA 6-9*   RBCUA 11-20*     Urine Culture: No results for input(s): LABURIN in the last 72 hours. Urine Chemistry: No results for input(s): Robbie Hashimoto, PROTEINUR, NAUR in the last 72 hours. IMAGING:  XR CHEST PORTABLE   Final Result      1. Stable cardiomegaly with multilead left-sided pacing device. 2.  Pulmonary vascular congestion without localized consolidation in this patient with low lung volumes.                         Thank you for allowing us to participate in care of [address] [place], [place] [address]y 6 free to contact me   [facility] [address]  Office : [phone]  Fax :[phone]

## 2020-10-30 NOTE — ED PROVIDER NOTES
 Sinusitis          Procedure Laterality Date    PACEMAKER PLACEMENT       His family history includes No Known Problems in his father and mother. He reports that he has never smoked. He has never used smokeless tobacco. He reports that he does not drink alcohol or use drugs. Medications     Current Discharge Medication List      CONTINUE these medications which have NOT CHANGED    Details   lisinopril-hydroCHLOROthiazide (PRINZIDE;ZESTORETIC) 20-12.5 MG per tablet Take 1 tablet by mouth daily      meloxicam (MOBIC) 7.5 MG tablet Take 7.5 mg by mouth daily      lisinopril (PRINIVIL;ZESTRIL) 20 MG tablet Take 1 tablet by mouth daily  Qty: 30 tablet, Refills: 3      methocarbamol (ROBAXIN) 500 MG tablet Take 1 tablet by mouth 3 times daily as needed (muscle spasms)  Qty: 30 tablet, Refills: 0      chlorproMAZINE (THORAZINE) 100 MG tablet Take 100 mg by mouth 2 times daily      polyethylene glycol (MIRALAX) 17 g PACK packet Take 17 g by mouth as needed      OXcarbazepine (TRILEPTAL) 300 MG tablet Take 300 mg by mouth 3 times daily      calcium carbonate (TUMS) 500 MG chewable tablet Take 2 tablets by mouth 2 times daily as needed for Heartburn      !! ipratropium-albuterol (DUONEB) 0.5-2.5 (3) MG/3ML SOLN nebulizer solution Inhale 3 mLs into the lungs every 4 hours as needed for Shortness of Breath  Qty: 360 mL, Refills: 0      !! ipratropium-albuterol (DUONEB) 0.5-2.5 (3) MG/3ML SOLN nebulizer solution Inhale 3 mLs into the lungs 2 times daily  Qty: 360 mL, Refills: 0      fluocinonide (LIDEX) 0.05 % external solution Apply to scalp topically as needed      ketoconazole (NIZORAL) 2 % shampoo Apply topically Twice a Week Apply to scalp every night shift on Tues and Saturday -- massage into scalp and leave on for 5-10 min      ketoconazole (NIZORAL) 2 % cream Apply topically daily Apply topically to face and ears daily.       dextromethorphan-guaiFENesin (MUCINEX DM)  MG per extended release tablet Take 1 tablet by mouth every 12 hours as needed      phenylephrine-cocoa butter (PREPARATION H) 0.25-88.44 % Place 1 suppository rectally every 8 hours as needed for Hemorrhoids       senna (SENOKOT) 8.6 MG tablet Take 2 tablets by mouth every evening      trolamine salicylate (ASPERCREME) 10 % cream Apply topically to left shoulder 2 times daily as needed. Omega 3 1000 MG CAPS Take 1 capsule by mouth daily       hydrocortisone 1 % cream Apply to face daily as needed for itchy skin      carvedilol (COREG) 25 MG tablet Take 25 mg by mouth 2 times daily (with meals). OLANZapine (ZYPREXA) 20 MG tablet Take 15 mg by mouth 2 times daily        ! ! - Potential duplicate medications found. Please discuss with provider. Allergies     He is allergic to cephalosporins; lorazepam; and pcn [penicillins]. Physical Exam     ED Triage Vitals [10/30/20 0715]   Enc Vitals Group      BP (!) 147/102      Pulse 72      Resp 16      Temp 98.2 °F (36.8 °C)      Temp Source Oral      SpO2 94 %      Weight 233 lb (105.7 kg)      Height 5' 10\" (1.778 m)      Head Circumference       Peak Flow       Pain Score       Pain Loc       Pain Edu? Excl. in [address]? General:  Non-toxic, no acute distress, fully cooperative with my exam    HEENT:  NCAT    Neck:  Supple    Pulmonary:   No increased work of breathing; CTAB    Cardiac:  RRR, no murmur, thrill or gallop. Capillary refill <3s. 2+ distal pulses    Abdomen:  Soft, nontender, nondistended; no focal rebound or guarding. Dark maroon bloody stool in the rectal vault     Musculoskeletal:  Grossly intact without obvious injury or deformity    Neuro:  No focal motor deficits, mumbled speech but intermittently clear. Skin: No rashes      Diagnostic Results     EKG   No acute ischemic changes    RADIOLOGY:  XR CHEST PORTABLE   Final Result      1. Stable cardiomegaly with multilead left-sided pacing device.       2.  Pulmonary vascular congestion without localized consolidation in this patient with low lung volumes.           LABS:   Results for orders placed or performed during the hospital encounter of 10/30/20   CBC Auto Differential   Result Value Ref Range    WBC 5.4 4.0 - 11.0 K/uL    RBC 3.81 (L) 4.20 - 5.90 M/uL    Hemoglobin 11.2 (L) 13.5 - 17.5 g/dL    Hematocrit 36.2 (L) 40.5 - 52.5 %    MCV 95.2 80.0 - 100.0 fL    MCH 29.4 26.0 - 34.0 pg    MCHC 30.9 (L) 31.0 - 36.0 g/dL    RDW 16.1 (H) 12.4 - 15.4 %    Platelets 137 114 - 417 K/uL    MPV 8.8 5.0 - 10.5 fL    Neutrophils % 55.2 %    Lymphocytes % 29.7 %    Monocytes % 11.0 %    Eosinophils % 3.6 %    Basophils % 0.5 %    Neutrophils Absolute 3.0 1.7 - 7.7 K/uL    Lymphocytes Absolute 1.6 1.0 - 5.1 K/uL    Monocytes Absolute 0.6 0.0 - 1.3 K/uL    Eosinophils Absolute 0.2 0.0 - 0.6 K/uL    Basophils Absolute 0.0 0.0 - 0.2 K/uL   Basic Metabolic Panel w/ Reflex to MG   Result Value Ref Range    Sodium 141 136 - 145 mmol/L    Potassium reflex Magnesium 5.6 (H) 3.5 - 5.1 mmol/L    Chloride 106 99 - 110 mmol/L    CO2 28 21 - 32 mmol/L    Anion Gap 7 3 - 16    Glucose 107 (H) 70 - 99 mg/dL    BUN 18 7 - 20 mg/dL    CREATININE 1.5 (H) 0.8 - 1.3 mg/dL    GFR Non- 47 (A) >60    GFR  57 (A) >60    Calcium 9.7 8.3 - 10.6 mg/dL   Protime-INR   Result Value Ref Range    Protime 10.9 10.0 - 13.2 sec    INR 0.94 0.86 - 1.14   APTT   Result Value Ref Range    aPTT 22.1 (L) 24.2 - 36.2 sec   Blood Gas, Venous   Result Value Ref Range    pH, Ash 7.272 (L) 7.350 - 7.450    pCO2, Ash 71.3 (H) 41.0 - 51.0 mmHg    pO2, Ash 29.8 25.0 - 40.0 mmHg    HCO3, Venous 31.8 (H) 24.0 - 28.0 mmol/L    Base Excess, Ash 3.8 (H) -2.0 - 3.0 mmol/L    O2 Sat, Ash 45 Not established %    Carboxyhemoglobin 1.7 (H) 0.0 - 1.5 %    MetHgb, Ash 0.7 0.0 - 1.5 %    TC02 (Calc), Ash 34 mmol/L    Hemoglobin, Ash, Reduced 53.60 %   Troponin   Result Value Ref Range    Troponin <0.01 <0.01 ng/mL   Brain Natriuretic Peptide   Result Value Ref Range    Pro- (H) 0 - 124 pg/mL   Basic metabolic panel   Result Value Ref Range    Sodium 146 (H) 136 - 145 mmol/L    Potassium 4.5 3.5 - 5.1 mmol/L    Chloride 108 99 - 110 mmol/L    CO2 30 21 - 32 mmol/L    Anion Gap 8 3 - 16    Glucose 100 (H) 70 - 99 mg/dL    BUN 18 7 - 20 mg/dL    CREATININE 1.4 (H) 0.8 - 1.3 mg/dL    GFR Non- 51 (A) >60    GFR African American >60 >60    Calcium 9.9 8.3 - 10.6 mg/dL   POCT Blood Occult   Result Value Ref Range    POC Occult Blood Stool Positive Negative   EKG 12 Lead   Result Value Ref Range    Ventricular Rate 71 BPM    Atrial Rate 71 BPM    P-R Interval 188 ms    QRS Duration 218 ms    Q-T Interval 506 ms    QTc Calculation (Bazett) 549 ms    P Axis 79 degrees    R Axis 265 degrees    T Axis 55 degrees    Diagnosis       EKG performed in ER and to be interpreted by ER physician. Confirmed by MD, ER ([id number]),  Karthik Brooks [id number]) on 10/30/2020 9:23:02 AM       RECENT VITALS:  BP: 131/89, Temp: 98.1 °F (36.7 °C), Pulse: 65, Resp: 16, SpO2: 98 %     Procedures         ED Course     Nursing Notes, Past Medical Hx, Past Surgical Hx, Social Hx, Allergies, and Family Hx were reviewed.     The patient was given the following medications:  Orders Placed This Encounter   Medications    sodium chloride flush 0.9 % injection 10 mL    sodium chloride flush 0.9 % injection 10 mL    OR Linked Order Group     acetaminophen (TYLENOL) tablet 650 mg     acetaminophen (TYLENOL) suppository 650 mg    OR Linked Order Group     promethazine (PHENERGAN) tablet 12.5 mg     ondansetron (ZOFRAN) injection 4 mg    AND Linked Order Group     pantoprazole (PROTONIX) injection 40 mg     sodium chloride (PF) 0.9 % injection 10 mL    carvedilol (COREG) tablet 25 mg    chlorproMAZINE (THORAZINE) tablet 100 mg    ipratropium-albuterol (DUONEB) nebulizer solution 3 mL    ipratropium-albuterol (DUONEB) nebulizer solution 3 mL    methocarbamol (ROBAXIN) tablet 500 mg    OLANZapine (ZYPREXA) tablet 15 mg    OXcarbazepine (TRILEPTAL) tablet 300 mg    hydrALAZINE (APRESOLINE) injection 5 mg    0.9 % sodium chloride infusion       CONSULTS:  IP CONSULT TO HOSPITALIST  IP CONSULT TO GI    MEDICAL DECISION MAKING / ASSESSMENT / Damaris Blair is a 76 y.o. male who presents primarily with concern for GI bleeding. Rectal exam does indeed show some dark maroon blood in the rectal vault. He is hemodynamically stable and his hemoglobin is at baseline. He has noted to be moderately hypoxemic and with a mild respiratory acidosis. Placed on bilevel positive pressure ventilation to assist with clearing his hypercarbia. Discussed with the hospitalist who will admit for further care. Due to the immediate potential for life-threatening deterioration due to acute respiratory failure with hypoxemia and hypercapnia as well as acute gastrointestinal hemorrhage, I spent 47 minutes providing critical care. This time excludes time spent performing procedures but includes time spent on direct patient care, history retrieval, review of the chart, and discussions with patient, family, and consultant(s). Clinical Impression     1. Gastrointestinal hemorrhage, unspecified gastrointestinal hemorrhage type    2. Acute respiratory failure with hypoxia and hypercapnia (HCC)        Disposition     PATIENT REFERRED TO:  No follow-up provider specified.     DISCHARGE MEDICATIONS:  Current Discharge Medication List          DISPOSITION Admitted 10/30/2020 10:54:42 AM       Mariam Austin MD  10/30/20 9686

## 2020-10-30 NOTE — ED NOTES
This RN attempted to call [address] [place] [address] at this time for nursing report.       Karan Adame RN  10/30/20 9113

## 2020-10-30 NOTE — PROGRESS NOTES
RN called Gertrude Page to get an idea of baseline speech, meron RN stated that its rather garbled and hard to understand. RN also stated concerns over pts BP meds. Stated \"bottoms out\" at 100/50s due to added BPS since last discharge here recently. Will make team aware. Will continue to monitor.

## 2020-10-30 NOTE — ED NOTES
Pt presents to the ED from [facility] at this time via ems with c/o rectal bleed. Pt reports he does not know why he was sent to the ED. Pt a/o to person and place at this time, no s/s of distress noted at this time.      Yahaira Thrasher RN  10/30/20 7859

## 2020-10-30 NOTE — PROGRESS NOTES
Admission completed with assistance from [facility]. This RN questioned POA paperwork charted from previous admission, [address] RN stated pt is his own decision maker but his sister Bety Goyal (AKA \"cookie\") basically \"calls the shots\". Karolyn MOMIN stated concerns over pts OCD and psych issues. She states that pt is bowel obsessed and will sit on the toilet for hours on end, straining to have BM if he doesn't go every single day at 10AM.. States the bleeding rectum occurs frequently do these episodes as well. Will continue to monitor.

## 2020-10-30 NOTE — PROGRESS NOTES
RESPIRATORY THERAPY ASSESSMENT    Name:  Toy Davis  Medical Record Number:  [id number]  Age: 76 y.o. Gender: male  : 1955  Today's Date:  10/30/2020  Room:  [facility]430-01    Assessment     Is the patient being admitted for a COPD or Asthma exacerbation? No   (If yes the patient will be seen every 4 hours for the first 24 hours and then reassessed)    Patient Admission Diagnosis Gastrointestinal hemorrhage, acute respiratory failure      Allergies  Allergies   Allergen Reactions    Cephalosporins     Lorazepam     Pcn [Penicillins]          Pulmonary History:No history  Home Oxygen Therapy:  room air  Home Respiratory Therapy:Albuterol/Ipratropium Bromide HHN   Current Respiratory Therapy:  Duoneb HHN PRN and BID, Bipap           Respiratory Severity Index(RSI)   Patients with orders for inhalation medications, oxygen, or any therapeutic treatment modality will be placed on Respiratory Protocol. They will be assessed with the first treatment and at least every 72 hours thereafter. The following severity scale will be used to determine frequency of treatment intervention.     Smoking History: No Smoking History = 0    Social History  Social History     Tobacco Use    Smoking status: Never Smoker    Smokeless tobacco: Never Used   Substance Use Topics    Alcohol use: No    Drug use: No       Recent Surgical History: None = 0  Past Surgical History  Past Surgical History:   Procedure Laterality Date    PACEMAKER PLACEMENT         Level of Consciousness: Alert, Oriented, and Cooperative = 0    Level of Activity: Mostly sedentary, minimal walking = 2    Respiratory Pattern: Regular Pattern; RR 8-20 = 0    Breath Sounds: Diminshed bilaterally and/or crackles = 2    Sputum   ,  ,    Cough: Strong, spontaneous, non-productive = 0    Vital Signs   /78   Pulse 73   Temp 98.3 °F (36.8 °C) (Axillary)   Resp 16   Ht 5' 10\" (1.778 m)   Wt 233 lb (105.7 kg)   SpO2 93%   BMI 33.43 kg/m² SPO2 (COPD values may differ): 90-91% on room air or greater than 92% on FiO2 24- 28% = 1    Peak Flow (asthma only): not applicable = 0    RSI: 5-6 = Q4hr PRN (every four hours as needed) for dyspnea        Plan       Goals: medication delivery and improve oxygenation    Patient/caregiver was educated on the proper method of use for Respiratory Care Devices:  Yes      Level of patient/caregiver understanding able to:   ? Verbalize understanding   ? Demonstrate understanding       ? Teach back        ? Needs reinforcement       ? No available caregiver               ? Other:     Response to education:  Thelma Barrios     Is patient being placed on Home Treatment Regimen? Yes     Does the patient have everything they need prior to discharge? Yes     Comments: Pt refused prn tx. Pt in Covid R/O    Plan of Care: Combivent MDI BID and PRN, Bipap 12/5    Electronically signed by Ale Thompson RCP on 10/30/2020 at 5:13 PM    Respiratory Protocol Guidelines     1. Assessment and treatment by Respiratory Therapy will be initiated for medication and therapeutic interventions upon initiation of aerosolized medication. 2. Physician will be contacted for respiratory rate (RR) greater than 35 breaths per minute. Therapy will be held for heart rate (HR) greater than 140 beats per minute, pending direction from physician. 3. Bronchodilators will be administered via Metered Dose Inhaler (MDI) with spacer when the following criteria are met:  a. Alert and cooperative     b. HR < 140 bpm  c. RR < 30 bpm                d. Can demonstrate a 2-3 second inspiratory hold  4. Bronchodilators will be administered via Hand Held Nebulizer BALJINDER [facility]) to patients when ANY of the following criteria are met  a. Incognizant or uncooperative          b. Patients treated with HHN at Home        c. Unable to demonstrate proper use of MDI with spacer     d. RR > 30 bpm   5.  Bronchodilators will be delivered via Metered Dose Inhaler (MDI), HHN, Aerogen to intubated patients on mechanical ventilation. 6. Inhalation medication orders will be delivered and/or substituted as outlined below. Aerosolized Medications Ordering and Administration Guidelines:    1. All Medications will be ordered by a physician, and their frequency and/or modality will be adjusted as defined by the patients Respiratory Severity Index (RSI) score. 2. If the patient does not have documented COPD, consider discontinuing anticholinergics when RSI is less than 9.  3. If the bronchospasm worsens (increased RSI), then the bronchodilator frequency can be increased to a maximum of every 4 hours. If greater than every 4 hours is required, the physician will be contacted. 4. If the bronchospasm improves, the frequency of the bronchodilator can be decreased, based on the patient's RSI, but not less than home treatment regimen frequency. 5. Bronchodilator(s) will be discontinued if patient has a RSI less than 9 and has received no scheduled or as needed treatment for 72  Hrs. Patients Ordered on a Mucolytic Agent:    1. Must always be administered with a bronchodilator. 2. Discontinue if patient experiences worsened bronchospasm, or secretions have lessened to the point that the patient is able to clear them with a cough. Anti-inflammatory and Combination Medications:    1. If the patient lacks prior history of lung disease, is not using inhaled anti-inflammatory medication at home, and lacks wheezing by examination or by history for at least 24 hours, contact physician for possible discontinuation.

## 2020-10-30 NOTE — PROGRESS NOTES
Consult dictated. Plan Dx Colonoscopy Monday following 2 day pill prep since likely will not drink entire prep. Suggest retesting COVID now prior to Colonoscopy to institute appropriate precautions.  CBC QD.

## 2020-10-30 NOTE — PROGRESS NOTES
Pt arrived from ED to room 4301. Pt rather drowsy/lethargic making assessment difficult. Pt settled into room with call light within reach. Will continue to reasses and monitor.

## 2020-10-31 LAB
AMMONIA: 21 UMOL/L (ref 16–60)
ANION GAP SERPL CALCULATED.3IONS-SCNC: 7 MMOL/L (ref 3–16)
BACTERIA: ABNORMAL /HPF
BILIRUBIN URINE: NEGATIVE
BLOOD, URINE: ABNORMAL
BUN BLDV-MCNC: 17 MG/DL (ref 7–20)
CALCIUM SERPL-MCNC: 10.2 MG/DL (ref 8.3–10.6)
CHLORIDE BLD-SCNC: 105 MMOL/L (ref 99–110)
CLARITY: ABNORMAL
CO2: 30 MMOL/L (ref 21–32)
COLOR: YELLOW
COMMENT UA: ABNORMAL
CREAT SERPL-MCNC: 1.4 MG/DL (ref 0.8–1.3)
CRYSTALS, UA: ABNORMAL /HPF
GFR AFRICAN AMERICAN: >60
GFR NON-AFRICAN AMERICAN: 51
GLUCOSE BLD-MCNC: 122 MG/DL (ref 70–99)
GLUCOSE URINE: NEGATIVE MG/DL
HCT VFR BLD CALC: 36.2 % (ref 40.5–52.5)
HCT VFR BLD CALC: 37.6 % (ref 40.5–52.5)
HEMOGLOBIN: 11.4 G/DL (ref 13.5–17.5)
HEMOGLOBIN: 11.6 G/DL (ref 13.5–17.5)
KETONES, URINE: NEGATIVE MG/DL
LEUKOCYTE ESTERASE, URINE: ABNORMAL
MICROSCOPIC EXAMINATION: YES
NITRITE, URINE: NEGATIVE
PH UA: 6 (ref 5–8)
POTASSIUM REFLEX MAGNESIUM: 4.5 MMOL/L (ref 3.5–5.1)
PROTEIN UA: ABNORMAL MG/DL
RBC UA: ABNORMAL /HPF (ref 0–4)
SODIUM BLD-SCNC: 142 MMOL/L (ref 136–145)
SPECIFIC GRAVITY UA: 1.02 (ref 1–1.03)
URINE REFLEX TO CULTURE: ABNORMAL
URINE TYPE: ABNORMAL
UROBILINOGEN, URINE: 0.2 E.U./DL
WBC UA: ABNORMAL /HPF (ref 0–5)

## 2020-10-31 PROCEDURE — 87086 URINE CULTURE/COLONY COUNT: CPT

## 2020-10-31 PROCEDURE — 94660 CPAP INITIATION&MGMT: CPT

## 2020-10-31 PROCEDURE — C9113 INJ PANTOPRAZOLE SODIUM, VIA: HCPCS | Performed by: INTERNAL MEDICINE

## 2020-10-31 PROCEDURE — 85014 HEMATOCRIT: CPT

## 2020-10-31 PROCEDURE — 2700000000 HC OXYGEN THERAPY PER DAY

## 2020-10-31 PROCEDURE — 83540 ASSAY OF IRON: CPT

## 2020-10-31 PROCEDURE — 6370000000 HC RX 637 (ALT 250 FOR IP): Performed by: INTERNAL MEDICINE

## 2020-10-31 PROCEDURE — 94761 N-INVAS EAR/PLS OXIMETRY MLT: CPT

## 2020-10-31 PROCEDURE — 36415 COLL VENOUS BLD VENIPUNCTURE: CPT

## 2020-10-31 PROCEDURE — 2580000003 HC RX 258: Performed by: INTERNAL MEDICINE

## 2020-10-31 PROCEDURE — 85018 HEMOGLOBIN: CPT

## 2020-10-31 PROCEDURE — 2060000000 HC ICU INTERMEDIATE R&B

## 2020-10-31 PROCEDURE — 83550 IRON BINDING TEST: CPT

## 2020-10-31 PROCEDURE — 94640 AIRWAY INHALATION TREATMENT: CPT

## 2020-10-31 PROCEDURE — 82140 ASSAY OF AMMONIA: CPT

## 2020-10-31 PROCEDURE — 6360000002 HC RX W HCPCS: Performed by: INTERNAL MEDICINE

## 2020-10-31 PROCEDURE — 81001 URINALYSIS AUTO W/SCOPE: CPT

## 2020-10-31 PROCEDURE — 80048 BASIC METABOLIC PNL TOTAL CA: CPT

## 2020-10-31 RX ADMIN — CARVEDILOL 25 MG: 25 TABLET, FILM COATED ORAL at 09:54

## 2020-10-31 RX ADMIN — BISACODYL 10 MG: 5 TABLET, COATED ORAL at 18:43

## 2020-10-31 RX ADMIN — IPRATROPIUM BROMIDE AND ALBUTEROL 1 PUFF: 20; 100 SPRAY, METERED RESPIRATORY (INHALATION) at 09:26

## 2020-10-31 RX ADMIN — BISACODYL 10 MG: 5 TABLET, COATED ORAL at 09:54

## 2020-10-31 RX ADMIN — Medication 10 ML: at 12:04

## 2020-10-31 RX ADMIN — PANTOPRAZOLE SODIUM 40 MG: 40 INJECTION, POWDER, FOR SOLUTION INTRAVENOUS at 12:04

## 2020-10-31 RX ADMIN — BISACODYL 10 MG: 5 TABLET, COATED ORAL at 12:04

## 2020-10-31 RX ADMIN — Medication 10 ML: at 09:55

## 2020-10-31 RX ADMIN — OXCARBAZEPINE 300 MG: 150 TABLET, FILM COATED ORAL at 09:53

## 2020-10-31 RX ADMIN — BISACODYL 10 MG: 5 TABLET, COATED ORAL at 22:47

## 2020-10-31 RX ADMIN — PANTOPRAZOLE SODIUM 40 MG: 40 INJECTION, POWDER, FOR SOLUTION INTRAVENOUS at 22:59

## 2020-10-31 RX ADMIN — Medication 10 ML: at 22:59

## 2020-10-31 RX ADMIN — OXCARBAZEPINE 300 MG: 150 TABLET, FILM COATED ORAL at 20:08

## 2020-10-31 RX ADMIN — OLANZAPINE 15 MG: 15 TABLET, FILM COATED ORAL at 20:08

## 2020-10-31 RX ADMIN — Medication 10 ML: at 23:00

## 2020-10-31 RX ADMIN — IPRATROPIUM BROMIDE AND ALBUTEROL 1 PUFF: 20; 100 SPRAY, METERED RESPIRATORY (INHALATION) at 20:02

## 2020-10-31 RX ADMIN — CHLORPROMAZINE HYDROCHLORIDE 100 MG: 25 TABLET, SUGAR COATED ORAL at 20:08

## 2020-10-31 RX ADMIN — CHLORPROMAZINE HYDROCHLORIDE 100 MG: 25 TABLET, SUGAR COATED ORAL at 09:54

## 2020-10-31 RX ADMIN — OXCARBAZEPINE 300 MG: 150 TABLET, FILM COATED ORAL at 14:28

## 2020-10-31 RX ADMIN — OLANZAPINE 15 MG: 15 TABLET, FILM COATED ORAL at 09:54

## 2020-10-31 RX ADMIN — CARVEDILOL 25 MG: 25 TABLET, FILM COATED ORAL at 18:43

## 2020-10-31 ASSESSMENT — PAIN SCALES - PAIN ASSESSMENT IN ADVANCED DEMENTIA (PAINAD)
FACIALEXPRESSION: 0
BODYLANGUAGE: 0
BREATHING: 0
CONSOLABILITY: 0
FACIALEXPRESSION: 0
TOTALSCORE: 0
BODYLANGUAGE: 0
TOTALSCORE: 0
TOTALSCORE: 0
NEGVOCALIZATION: 0
FACIALEXPRESSION: 0
FACIALEXPRESSION: 0
BREATHING: 0
BREATHING: 0
NEGVOCALIZATION: 0
CONSOLABILITY: 0
BREATHING: 0
BODYLANGUAGE: 0
TOTALSCORE: 0
CONSOLABILITY: 0
CONSOLABILITY: 0
BODYLANGUAGE: 0
NEGVOCALIZATION: 0
NEGVOCALIZATION: 0

## 2020-10-31 ASSESSMENT — PAIN SCALES - GENERAL
PAINLEVEL_OUTOF10: 0

## 2020-10-31 NOTE — CONSULTS
[address]               [address] [facility], [address] [place] [address]                                  CONSULTATION    PATIENT NAME: Negro King                  :        1955  MED REC NO:   [id number]                          ROOM:       200  ACCOUNT NO:   [de-identified]                           ADMIT DATE: 10/30/2020  PROVIDER:     Natalie Nails MD    CONSULT DATE:  10/30/2020    HISTORY OF PRESENT ILLNESS:  This is a 77-year-old male with history of  coronary artery disease, hypertension, diabetes, paranoid schizophrenia,  recently admitted and treated for COVID-19 from 10/17/2020 to _____. At  that time he had mental status changes. He presents with some rectal  bleeding. Apparently, he has high cognitive fixation, was having bowel  movements on a regular schedule. He has very poor historical  information. He has been saturating over 90% on 2 liters. He does not  admit to any dysphagia, hematemesis. No reports of any melena. No  prior history of liver or pancreatic disorders. No current abdominal  pain. Apparently, he had hematochezia with defecation. Hemoglobin on  admission was in the range of 11 to 12. BUN is normal.    PAST MEDICAL HISTORY:  ALLERGIES:  CEPHALOSPORIN, LORAZEPAM, PENICILLIN. HOME MEDICATIONS:  Reviewed. PAST SURGICAL HISTORY:  Pacemaker implantation. SOCIAL HISTORY:  No tobacco.  No alcohol. REVIEW OF SYSTEMS:  Noncontributory. PHYSICAL EXAMINATION:  VITAL SIGNS:  Stable. Afebrile. HEENT:  No conjunctival icterus. CHEST:  Clear. CARDIOVASCULAR:  Regular rate and rhythm. No murmur, gallop, rub or  click. ABDOMEN:  Obese. Bowel sounds present. Soft, nontender, and  nondistended. No masses. RECTAL:  Deferred. SKIN:  No edema. NODES:  No adenopathy. LABORATORY DATA:  White count 54,100, hemoglobin 11.2.   MCV normal.  BUN  18, creatinine 1.5, INR normal.    IMPRESSION AND PLAN:  The patient will undergo diagnostic colonoscopy  with MAC anesthesia on Monday provided he has stable oxygen saturations. Likely would not comply with a traditional liquid bowel prep; therefore,  I am going to use Dulcolax four times a day for the next 48 hours and a  bowl of mag citrate Isiah evening as a prep. H and H to be noted every  12 hours. He is hemodynamically stable. Further recommendation after  clinical correlation at this point.         Alessandra Bella MD    D: 10/30/2020 18:31:28       T: 10/30/2020 23:21:51     [email or web address]  Job#: [id number]     Doc#: [id number]    CC:  Deion Kirkland MD

## 2020-10-31 NOTE — PLAN OF CARE
Problem: Gas Exchange - Impaired  Goal: Absence of hypoxia  Outcome: Met This Shift   Patients oxygen saturation within normal limits this shift. Patient remains on 2 L O2. Problem: Falls - Risk of:  Goal: Absence of physical injury  Description: Absence of physical injury  Outcome: Met This Shift     Problem: Falls - Risk of:  Goal: Will remain free from falls  Description: Will remain free from falls  Outcome: Met This Shift     Problem: Skin Integrity:  Goal: Absence of new skin breakdown  Description: Absence of new skin breakdown  Outcome: Met This Shift    Patient being turned Q2 hours this shift.  No new skin breakdown noted

## 2020-10-31 NOTE — PLAN OF CARE
Problem: Falls - Risk of:  Goal: Will remain free from falls  Description: Will remain free from falls  Outcome: Ongoing   Fall precautions in place. Bed alarm activated, low position, wheels locked. Up with assist x 2 with elida steady. Call light reviewed and in reach. Avasys monitor in room with 24 hour monitoring per MW. Continue to monitor safety. Problem: Skin Integrity:  Goal: Will show no infection signs and symptoms  Description: Will show no infection signs and symptoms  Outcome: Ongoing   No s/s of infection this shift. Repositioned with pillow support q 2 hours to prevent skin breakdown. Problem: Gas Exchange - Impaired  Goal: Absence of hypoxia  Outcome: Ongoing   SpO2 93-94%. Placed on bi-pap overnight for lethargy. Problem: Body Temperature -  Risk of, Imbalanced  Goal: Ability to maintain a body temperature within defined limits  Outcome: Ongoing   Afebrile. Problem: Isolation Precautions - Risk of Spread of Infection  Goal: Prevent transmission of infection  Outcome: Ongoing   Utilizing proper PPE during shift. Problem: Mental Status - Impaired:  Goal: Mental status will improve  Description: Mental status will improve  10/31/2020 0343 by Ziyad Juarez RN  Outcome: Ongoing   Monitoring mentation. Bi-pap placed when patient unable to answer orientation questions.

## 2020-10-31 NOTE — PROGRESS NOTES
Progress Note    Admit Date: 10/30/2020    Patient's PCP: Dr. Lauren Marcos MD       76 y.o. male with past medical history of CAD, hypertension, diabetes mellitus, JONATHAN, paranoid schizophrenia who presented to the ED with rectal bleeding. Recently admitted 10/7/2020 to 10/16/2020 for  AMS, and noted with  Sepsis secondary to [address]. Was initially on vancomycin and meropenem x 4 days, as well as acyclovir for possible meningitis. Cx data negative and CSF suggestive of aseptic lymphocytic meningitis. Meningitis encephalitis panel negative. CTPA was negative for PE. +small air space opacities. He was treated with Remdesivir. His Acute metabolic encephalopathy was felt likely secondary to Covid19/aseptic meningitis as well as underlying schizophrenia, and improved at time of discharge to facility, from where he was brought to the Ed due to concern for rectal bleeding. In the ED, he was afebrile. Sats were 88-94% and he required supplemental oxygen. Labs showed stable hgb 11.2. MCV was 95. Other labs were satisfactory. CXR showed Pulmonary vascular congestion without localized consolidation in this patient with low lung volumes. There was concern for GI bleed, on account of which he was admitted for further evaluation and management. Interval HPI  No new complaints    Very drowsy, somnolent today    No fever  No chest pain      Medications : Reviewed        PHYSICAL EXAM:  /75   Pulse 71   Temp 98.5 °F (36.9 °C) (Axillary)   Resp 16   Ht 5' 10\" (1.778 m)   Wt 212 lb 11.9 oz (96.5 kg)   SpO2 96%   BMI 30.53 kg/m²     No results for input(s): POCGLU in the last 72 hours. General appearance:  NAD Obese  Heart[de-identified] Normal s1/s2, RRR, no murmurs, gallops, or rubs. No leg edema  Lungs:  Diminished breath sounds brenna at bases.   Abdomen: Soft, non-tender, non-distended, bowel sounds present, no masses  Musculoskeletal:  No clubbing, no cyanosis, or edema  Skin: No lesion or masses  Neurologic: Grossly non-focal.  Psychiatric:  Confused, slightly drowsy. No gross focal deficits. LABS:  Recent Labs     10/30/20  0803 10/30/20  1700   WBC 5.4  --    HGB 11.2* 11.5*   HCT 36.2* 36.1*     --                                                                   Recent Labs     10/30/20  0803 10/30/20  1231 10/31/20  0431    146* 142   K 5.6* 4.5 4.5    108 105   CO2 28 30 30   BUN 18 18 17   CREATININE 1.5* 1.4* 1.4*   GLUCOSE 107* 100* 122*     No results for input(s): AST, ALT, ALB, BILITOT, ALKPHOS in the last 72 hours. Recent Labs     10/30/20  0803   TROPONINI <0.01     No results for input(s): BNP in the last 72 hours. Lab Results   Component Value Date    PHART 7.471 08/10/2019    CFR2RLK 45.6 08/10/2019    PO2ART 65.8 08/10/2019     Recent Labs     10/30/20  0803   INR 0.94     No results for input(s): NITRITE, COLORU, PHUR, LABCAST, WBCUA, RBCUA, MUCUS, TRICHOMONAS, YEAST, BACTERIA, CLARITYU, SPECGRAV, LEUKOCYTESUR, UROBILINOGEN, BILIRUBINUR, BLOODU, GLUCOSEU, AMORPHOUS in the last 72 hours. Invalid input(s): Layton Acosta         Echo 2018:  Normal left ventricle size. There is mild to moderate concentric left   ventricular hypertrophy. Overall left ventricular systolic function appears   moderately reduced with an ejection fraction visually estimated at 30-35%. The apex appears akinetic. Morris septum is hypokinetic. Diastolic filling parameters suggest grade II diastolic dysfunction. Mild mitral regurgitation is present. Trace aortic valve regurgitation. Mild tricuspid regurgitation. Estimated pulmonary artery systolic pressure is 29 mmHg assuming a right   atrial pressure of 8 mmHg. The right atrium is dilated. Pacer / ICD wire is visualized in the right ventricle. Assessment & Plan:    76 y.o. male with past medical history of CAD, hypertension, diabetes mellitus, JONATHAN, paranoid schizophrenia who presented to the ED with rectal bleeding.          Acute GI bleed  - Concern for GI bleed  - Pxt is on Mobic  - Red blood noted in Ed per ED physician  - Hgb looks stable c/f recent  - Clears  - Protonix  - Typed and screen blood  - Trend hgb: will decrease freq. - GI consulted; colonoscopy on Monday      Mild acute Chronic systolic heart failure  -  CXR showed \"Pulmonary vascular congestion without localized consolidation in this patient with low lung volumes\". - Does not appear to be on home diuretics, received fluids last admission  - Pro BNP, 300s; improved with lasix  - Appears Euvolemic at this time: DC'd IV lasix. - May consider small dose maintenance lasix in a few days   - Dly weights, I/O monitoring  - Update echo        CAD/ishcemic cardiomyopathy  - Has ICD  - Holding lisinopril for now  - Continue Coreg resumed  - Telemetry        JONATHAN  - Placed on BiPAP in ED.   - Pxt is supposed to use BiPAP but reportedly due to recent COVID status, was not being used in the facility  - Continue nightly BiPAP/oxygen        JOSÉ   - Holding Lisinopril/HCTZ  - Monitor creatinine  - Nephrology consulted       Sick sinus syndrome:  Status post pacemaker placement       Diabetes mellitus type 2:  Monitor blood glucose , sliding scale if needed       Paranoid schizophrenia:  Oxcarbazepine and chlorpromazine  Pxt appears drowsy: pharmacy to verify hx/doses; hold for now         Recent COVID PNA with Respiratory failure, Sepsis,  Encephalopathy and Aseptic meningitis  - Was treated with on decadron, remdesivir  - COVId testing done in ED: Pending  - Supportive care        Full Code       Dispo: in pxt.  Pending progress  Colonoscopy planned Scarlet Olivas MD

## 2020-10-31 NOTE — CONSULTS
Clinical Pharmacy Progress Note  Medication History     Admit Date: 10/30/2020    List of of current medications patient is taking is complete. Home Medication list in EPIC updated to reflect changes noted below. Source of information: SNF: [facility] [phone]    The following changes were made to the medication list:   · None   · Of note, per nursing home pt is taking both lisinopril 20 mg and the combination lisinopril-HCTZ 20-12.5 mg for total lisinopril dose of 40 mg daily. Nurse reports pt was taking combo prior to last hospital admission to [facility] then lisinopril 20 mg daily was added upon discharge. Per chart review, patient was taking lisinopril 10 mg daily when admitted 10/6, lisinopril 20 mg daily prescribed upon discharge  · Please review HTN medications upon discharge    Please call with any questions.   Rossi Boyer, PharmD  PGY1 Pharmacy Resident  10/31/2020 5:32 PM   [id number]

## 2020-11-01 ENCOUNTER — APPOINTMENT (OUTPATIENT)
Dept: GENERAL RADIOLOGY | Age: 65
DRG: 393 | End: 2020-11-01
Payer: MEDICARE

## 2020-11-01 LAB
ANION GAP SERPL CALCULATED.3IONS-SCNC: 9 MMOL/L (ref 3–16)
BASE EXCESS ARTERIAL: 6 MMOL/L (ref -3–3)
BASOPHILS ABSOLUTE: 0 K/UL (ref 0–0.2)
BASOPHILS RELATIVE PERCENT: 0.3 %
BUN BLDV-MCNC: 17 MG/DL (ref 7–20)
CALCIUM SERPL-MCNC: 9.7 MG/DL (ref 8.3–10.6)
CARBOXYHEMOGLOBIN ARTERIAL: 2.5 % (ref 0–1.5)
CHLORIDE BLD-SCNC: 98 MMOL/L (ref 99–110)
CO2: 32 MMOL/L (ref 21–32)
CORTISOL TOTAL: 6.8 UG/DL
CREAT SERPL-MCNC: 1.4 MG/DL (ref 0.8–1.3)
EOSINOPHILS ABSOLUTE: 0.2 K/UL (ref 0–0.6)
EOSINOPHILS RELATIVE PERCENT: 4.1 %
GFR AFRICAN AMERICAN: >60
GFR NON-AFRICAN AMERICAN: 51
GLUCOSE BLD-MCNC: 119 MG/DL (ref 70–99)
HCO3 ARTERIAL: 32 MMOL/L (ref 21–29)
HCT VFR BLD CALC: 33.9 % (ref 40.5–52.5)
HCT VFR BLD CALC: 35.3 % (ref 40.5–52.5)
HCT VFR BLD CALC: 37 % (ref 40.5–52.5)
HEMOGLOBIN, ART, EXTENDED: 11.8 G/DL
HEMOGLOBIN: 10.8 G/DL (ref 13.5–17.5)
HEMOGLOBIN: 11.2 G/DL (ref 13.5–17.5)
HEMOGLOBIN: 11.8 G/DL (ref 13.5–17.5)
LYMPHOCYTES ABSOLUTE: 1.4 K/UL (ref 1–5.1)
LYMPHOCYTES RELATIVE PERCENT: 25.5 %
MCH RBC QN AUTO: 29.7 PG (ref 26–34)
MCHC RBC AUTO-ENTMCNC: 31.9 G/DL (ref 31–36)
MCV RBC AUTO: 92.9 FL (ref 80–100)
METHEMOGLOBIN ARTERIAL: 0.8 % (ref 0–1.4)
MONOCYTES ABSOLUTE: 0.6 K/UL (ref 0–1.3)
MONOCYTES RELATIVE PERCENT: 11.9 %
NEUTROPHILS ABSOLUTE: 3.1 K/UL (ref 1.7–7.7)
NEUTROPHILS RELATIVE PERCENT: 58.2 %
O2 SAT, ARTERIAL: 93 % (ref 93–100)
PCO2 ARTERIAL: 54.7 MMHG (ref 35–45)
PDW BLD-RTO: 15.5 % (ref 12.4–15.4)
PH ARTERIAL: 7.38 (ref 7.35–7.45)
PLATELET # BLD: 195 K/UL (ref 135–450)
PMV BLD AUTO: 8.6 FL (ref 5–10.5)
PO2 ARTERIAL: 65.6 MMHG (ref 75–108)
POTASSIUM REFLEX MAGNESIUM: 3.7 MMOL/L (ref 3.5–5.1)
PRO-BNP: 82 PG/ML (ref 0–124)
RBC # BLD: 3.65 M/UL (ref 4.2–5.9)
SARS-COV-2, PCR: DETECTED
SODIUM BLD-SCNC: 139 MMOL/L (ref 136–145)
TCO2 ARTERIAL: 34 MMOL/L
WBC # BLD: 5.4 K/UL (ref 4–11)

## 2020-11-01 PROCEDURE — 6370000000 HC RX 637 (ALT 250 FOR IP): Performed by: INTERNAL MEDICINE

## 2020-11-01 PROCEDURE — 36415 COLL VENOUS BLD VENIPUNCTURE: CPT

## 2020-11-01 PROCEDURE — 82803 BLOOD GASES ANY COMBINATION: CPT

## 2020-11-01 PROCEDURE — 85014 HEMATOCRIT: CPT

## 2020-11-01 PROCEDURE — 2580000003 HC RX 258: Performed by: INTERNAL MEDICINE

## 2020-11-01 PROCEDURE — 83880 ASSAY OF NATRIURETIC PEPTIDE: CPT

## 2020-11-01 PROCEDURE — C9113 INJ PANTOPRAZOLE SODIUM, VIA: HCPCS | Performed by: INTERNAL MEDICINE

## 2020-11-01 PROCEDURE — 2700000000 HC OXYGEN THERAPY PER DAY

## 2020-11-01 PROCEDURE — 82533 TOTAL CORTISOL: CPT

## 2020-11-01 PROCEDURE — 6360000002 HC RX W HCPCS: Performed by: INTERNAL MEDICINE

## 2020-11-01 PROCEDURE — 94660 CPAP INITIATION&MGMT: CPT

## 2020-11-01 PROCEDURE — 71045 X-RAY EXAM CHEST 1 VIEW: CPT

## 2020-11-01 PROCEDURE — 85018 HEMOGLOBIN: CPT

## 2020-11-01 PROCEDURE — 94761 N-INVAS EAR/PLS OXIMETRY MLT: CPT

## 2020-11-01 PROCEDURE — 85025 COMPLETE CBC W/AUTO DIFF WBC: CPT

## 2020-11-01 PROCEDURE — 2060000000 HC ICU INTERMEDIATE R&B

## 2020-11-01 PROCEDURE — 80048 BASIC METABOLIC PNL TOTAL CA: CPT

## 2020-11-01 PROCEDURE — 94640 AIRWAY INHALATION TREATMENT: CPT

## 2020-11-01 RX ADMIN — BISACODYL 10 MG: 5 TABLET, COATED ORAL at 08:21

## 2020-11-01 RX ADMIN — OLANZAPINE 15 MG: 15 TABLET, FILM COATED ORAL at 08:20

## 2020-11-01 RX ADMIN — PANTOPRAZOLE SODIUM 40 MG: 40 INJECTION, POWDER, FOR SOLUTION INTRAVENOUS at 10:44

## 2020-11-01 RX ADMIN — Medication 10 ML: at 08:22

## 2020-11-01 RX ADMIN — BISACODYL 10 MG: 5 TABLET, COATED ORAL at 16:05

## 2020-11-01 RX ADMIN — IPRATROPIUM BROMIDE AND ALBUTEROL 1 PUFF: 20; 100 SPRAY, METERED RESPIRATORY (INHALATION) at 07:59

## 2020-11-01 RX ADMIN — CHLORPROMAZINE HYDROCHLORIDE 100 MG: 25 TABLET, SUGAR COATED ORAL at 08:21

## 2020-11-01 RX ADMIN — MAGNESIUM CITRATE 296 ML: 1.75 LIQUID ORAL at 11:43

## 2020-11-01 RX ADMIN — OXCARBAZEPINE 300 MG: 150 TABLET, FILM COATED ORAL at 08:20

## 2020-11-01 RX ADMIN — Medication 10 ML: at 10:45

## 2020-11-01 RX ADMIN — Medication 10 ML: at 20:32

## 2020-11-01 RX ADMIN — BISACODYL 10 MG: 5 TABLET, COATED ORAL at 20:45

## 2020-11-01 RX ADMIN — CARVEDILOL 25 MG: 25 TABLET, FILM COATED ORAL at 08:21

## 2020-11-01 RX ADMIN — IPRATROPIUM BROMIDE AND ALBUTEROL 1 PUFF: 20; 100 SPRAY, METERED RESPIRATORY (INHALATION) at 21:35

## 2020-11-01 ASSESSMENT — PAIN SCALES - GENERAL: PAINLEVEL_OUTOF10: 0

## 2020-11-01 NOTE — PROGRESS NOTES
Progress Note    Admit Date: 10/30/2020    Patient's PCP: Dr. Jason Lane MD       76 y.o. male with past medical history of CAD, hypertension, diabetes mellitus, JONATHAN, paranoid schizophrenia who presented to the ED with rectal bleeding. Recently admitted 10/7/2020 to 10/16/2020 for  AMS, and noted with  Sepsis secondary to [address]. Was initially on vancomycin and meropenem x 4 days, as well as acyclovir for possible meningitis. Cx data negative and CSF suggestive of aseptic lymphocytic meningitis. Meningitis encephalitis panel negative. CTPA was negative for PE. +small air space opacities. He was treated with Remdesivir. His Acute metabolic encephalopathy was felt likely secondary to Covid19/aseptic meningitis as well as underlying schizophrenia, and improved at time of discharge to facility, from where he was brought to the Ed due to concern for rectal bleeding. In the ED, he was afebrile. Sats were 88-94% and he required supplemental oxygen. Labs showed stable hgb 11.2. MCV was 95. Other labs were satisfactory. CXR showed Pulmonary vascular congestion without localized consolidation in this patient with low lung volumes. There was concern for GI bleed, on account of which he was admitted for further evaluation and management. Interval HPI  No new complaints  Appears confused, drowsy, somnolent    No fever  No chest pain      Medications : Reviewed        PHYSICAL EXAM:  /82   Pulse 71   Temp 96.7 °F (35.9 °C) (Axillary)   Resp 17   Ht 5' 10\" (1.778 m)   Wt 212 lb 11.9 oz (96.5 kg)   SpO2 98%   BMI 30.53 kg/m²     No results for input(s): POCGLU in the last 72 hours. General appearance:  NAD Obese  Heart[de-identified] Normal s1/s2, RRR, no murmurs, gallops, or rubs. No leg edema  Lungs:  Diminished breath sounds brenna at bases.   Abdomen: Soft, non-tender, non-distended, bowel sounds present, no masses  Musculoskeletal:  No clubbing, no cyanosis, or edema  Skin: No lesion or male with past medical history of CAD, hypertension, diabetes mellitus, JONATHAN, paranoid schizophrenia who presented to the ED with rectal bleeding. Acute GI bleed  - Concern for GI bleed  - Pxt is on Mobic  - Red blood noted in Ed per ED physician  - Hgb looks stable c/f recent  - Clears  - Protonix  - Typed and screen blood  - Trend hgb: will decrease freq. - GI consulted; colonoscopy on Monday           Acute encephalopathy  - Possibly toxic encephalopathy related to meds  - On decent dose of Olanzapine   - Also on trileptal, thorazine  - Evaluate for other etiology, like infections or other including metabolic encephalopathy related to hypercapnia from above and JONATHAN not been using CPAP at facility due to recent COVID.  - COVID testing pending.   - NH3: Not signif elevated  - Routine labs appear satisfactory  - Was decently evaluated during recent admission for mental status changes  - Check ABG stat  - Hold olanzapine and trileptal and thorazine until mental status improves: Pharmacy consulted to look into doses hx and hx of when these were started  - If no other etiology found for his somnolence, will need psych eval and review of these meds prior to resuming.   - NPO for now  - If ABG abnormal: Pulm consult. May need intubation if airway control becomes an issue, or aBG very abnormal.   - Supportive care.               Mild acute Chronic systolic heart failure  - Poor historian based on psych hx  - Required oxygen on presentation in the ED  - CXR showed \"Pulmonary vascular congestion without localized consolidation in this patient with low lung volumes\". - Does not appear to be on home diuretics, received fluids last admission  - Pro BNP, 300s; improved with lasix  - Appears Euvolemic at this time: DC'd IV lasix.    - May consider small dose maintenance lasix in a few days  - Update echo   - Dly weights, I/O monitoring             CAD/ishcemic cardiomyopathy  - Has ICD  - Holding lisinopril for now  - Continue Coreg resumed  - Telemetry           JONATHAN  - Placed on BiPAP in ED.   - Pxt is supposed to use BiPAP but reportedly due to recent COVID status, was not being used in the facility  - Continue nightly BiPAP/oxygen           JOSÉ   - Holding Lisinopril/HCTZ  - Monitor creatinine  - Nephrology consulted        Sick sinus syndrome:  Status post pacemaker placement        Diabetes mellitus type 2:  Monitor blood glucose , sliding scale if needed        Paranoid schizophrenia:  Oxcarbazepine and chlorpromazine  Pxt appears drowsy: pharmacy to verify hx/doses; hold for now           Recent COVID PNA with Respiratory failure, Sepsis,  Encephalopathy and Aseptic meningitis  - Was treated with on decadron, remdesivir  - COVId testing done in ED: Pending  - Supportive care         Full Code     PT/OT: ordered. Disposition: in pxt. Colonoscopy Monday.    Pxt is [address] [facility] [address] in 1-2 days if nil cheryl You MD

## 2020-11-01 NOTE — CONSULTS
Nephrology  Note                                                                                                                                                                                                                                                                                                                                                               Office : [phone]     Fax :[phone]              Patient's Name: Edith Nava  10/31/2020    Good UO   Cr stable   Off Oxygen   BP on low side       Past Medical History:   Diagnosis Date    Anemia     CAD (coronary artery disease)     Depression     Diabetes mellitus ([facility] [address]     GERD (gastroesophageal reflux disease)     Hypertension     Left anterior fascicular block     JONATHAN (obstructive sleep apnea)     Pacemaker     Paranoid schizophrenia ([facility]     Pneumonia due to 2019-nCoV 10/10/2020    Rash     Right bundle branch block     Sinus bradycardia     Sinusitis        Past Surgical History:   Procedure Laterality Date    PACEMAKER PLACEMENT         Family History   Problem Relation Age of Onset    No Known Problems Mother     No Known Problems Father         reports that he has never smoked. He has never used smokeless tobacco. He reports that he does not drink alcohol or use drugs.     Allergies:  Cephalosporins; Lorazepam; and Pcn [penicillins]    Current Medications:    sodium chloride flush 0.9 % injection 10 mL, 2 times per day  sodium chloride flush 0.9 % injection 10 mL, PRN  acetaminophen (TYLENOL) tablet 650 mg, Q6H PRN    Or  acetaminophen (TYLENOL) suppository 650 mg, Q6H PRN  promethazine (PHENERGAN) tablet 12.5 mg, Q6H PRN    Or  ondansetron (ZOFRAN) injection 4 mg, Q6H PRN  pantoprazole (PROTONIX) injection 40 mg, Q12H    And  sodium chloride (PF) 0.9 % injection 10 mL, Q12H  carvedilol (COREG) tablet 25 mg, BID WC  chlorproMAZINE (THORAZINE) tablet 100 mg, BID  ipratropium-albuterol (DUONEB) nebulizer solution 3 mL, Q4H PRN  methocarbamol (ROBAXIN) tablet 500 mg, TID PRN  OLANZapine (ZYPREXA) tablet 15 mg, BID  OXcarbazepine (TRILEPTAL) tablet 300 mg, TID  perflutren lipid microspheres (DEFINITY) injection 1.65 mg, ONCE PRN  labetalol (NORMODYNE;TRANDATE) injection 5 mg, Q6H PRN  albuterol-ipratropium (COMBIVENT RESPIMAT)  MCG/ACT inhaler 1 puff, BID  bisacodyl (DULCOLAX) EC tablet 10 mg, 4x Daily  [START ON 11/1/2020] magnesium citrate solution 296 mL, Once        Review of Systems:   14 point ROS obtained but were negative except mentioned in HPI      Physical exam:     Vitals:  /73   Pulse 65   Temp 98.9 °F (37.2 °C) (Oral)   Resp 20   Ht 5' 10\" (1.778 m)   Wt 212 lb 11.9 oz (96.5 kg)   SpO2 96%   BMI 30.53 kg/m²   Constitutional:  OAA X3 NAD  Skin: no rash, turgor wnl  Heent:  eomi, mmm  Neck: no bruits or jvd noted  Cardiovascular:  S1, S2 without m/r/g  Respiratory: CTA B without w/r/r  Abdomen:  +bs, soft, nt, nd  Ext: + lower extremity edema  Psychiatric: mood and affect appropriate  Musculoskeletal:  Rom, muscular strength intact    Data:   Labs:  CBC:   Recent Labs     10/30/20  0803 10/30/20  1700 10/31/20  1150   WBC 5.4  --   --    HGB 11.2* 11.5* 11.6*     --   --      BMP:    Recent Labs     10/30/20  0803 10/30/20  1231 10/31/20  0431    146* 142   K 5.6* 4.5 4.5    108 105   CO2 28 30 30   BUN 18 18 17   CREATININE 1.5* 1.4* 1.4*   GLUCOSE 107* 100* 122*     Ca/Mg/Phos:   Recent Labs     10/30/20  0803 10/30/20  1231 10/31/20  0431   CALCIUM 9.7 9.9 10.2     Hepatic: No results for input(s): AST, ALT, ALB, BILITOT, ALKPHOS in the last 72 hours. Troponin:   Recent Labs     10/30/20  0803   TROPONINI <0.01     BNP: No results for input(s): BNP in the last 72 hours. Lipids: No results for input(s): CHOL, TRIG, HDL, LDLCALC, LABVLDL in the last 72 hours. ABGs: No results for input(s): PHART, PO2ART, NBP6ZEA in the last 72 hours.   INR:   Recent Labs 10/30/20  0803   INR 0.94     UA:  Recent Labs     10/31/20  1443   COLORU Yellow   CLARITYU SL CLOUDY*   GLUCOSEU Negative   BILIRUBINUR Negative   KETUA Negative   SPECGRAV 1.025   BLOODU LARGE*   PHUR 6.0   PROTEINU TRACE*   UROBILINOGEN 0.2   NITRU Negative   LEUKOCYTESUR SMALL*   LABMICR YES   URINETYPE Voided      Urine Microscopic:   Recent Labs     10/31/20  1443   BACTERIA Rare*   COMU see below   WBCUA 6-9*   RBCUA 11-20*     Urine Culture: No results for input(s): LABURIN in the last 72 hours. Urine Chemistry: No results for input(s): Lawrence Juniper, PROTEINUR, NAUR in the last 72 hours. IMAGING:  XR CHEST PORTABLE   Final Result      1. Stable cardiomegaly with multilead left-sided pacing device. 2.  Pulmonary vascular congestion without localized consolidation in this patient with low lung volumes.               JOSÉ   GIB   CHF - EF 35 percent   COVID 19   JONATHAN  CAD     Plan   - Hold ACE/HCTZ   - Ur studies - reviewed   - Hold lasix today   - COVID pending   - labs in am     Recommend to dose adjust all medications  based on renal functions  Maintain SBP> 90 mmHg   Daily weights   AVOID NSAIDs  Avoid Nephrotoxins  Monitor Intake/Output  Call if significant decrease in urine output           Thank you for allowing us to participate in care of [address] [place] [address] [address] free to contact me   [facility] [address]  Office : [phone]  Fax :[phone]

## 2020-11-01 NOTE — PLAN OF CARE
Problem: Airway Clearance - Ineffective  Goal: Achieve or maintain patent airway  Outcome: Not Met This Shift     Problem: Gas Exchange - Impaired  Goal: Promote optimal lung function  Outcome: Not Met This Shift   Patient had increased altered mental status this shift. Patient put on BIPAP and mental status improved. MD will consult pulmonology if the patients mental status changes again.  Patient remains on 2 L NC when no on BIPAP

## 2020-11-02 ENCOUNTER — ANESTHESIA EVENT (OUTPATIENT)
Dept: ENDOSCOPY | Age: 65
DRG: 393 | End: 2020-11-02
Payer: MEDICARE

## 2020-11-02 ENCOUNTER — ANESTHESIA (OUTPATIENT)
Dept: ENDOSCOPY | Age: 65
DRG: 393 | End: 2020-11-02
Payer: MEDICARE

## 2020-11-02 VITALS — OXYGEN SATURATION: 100 % | DIASTOLIC BLOOD PRESSURE: 64 MMHG | SYSTOLIC BLOOD PRESSURE: 102 MMHG

## 2020-11-02 LAB
ANION GAP SERPL CALCULATED.3IONS-SCNC: 9 MMOL/L (ref 3–16)
BUN BLDV-MCNC: 14 MG/DL (ref 7–20)
CALCIUM SERPL-MCNC: 9.8 MG/DL (ref 8.3–10.6)
CHLORIDE BLD-SCNC: 98 MMOL/L (ref 99–110)
CO2: 30 MMOL/L (ref 21–32)
CREAT SERPL-MCNC: 1.3 MG/DL (ref 0.8–1.3)
D DIMER: 249 NG/ML DDU (ref 0–229)
FERRITIN: 278.1 NG/ML (ref 30–400)
FIBRINOGEN: 548 MG/DL (ref 200–397)
GFR AFRICAN AMERICAN: >60
GFR NON-AFRICAN AMERICAN: 55
GLUCOSE BLD-MCNC: 117 MG/DL (ref 70–99)
HCT VFR BLD CALC: 34.5 % (ref 40.5–52.5)
HCT VFR BLD CALC: 35.7 % (ref 40.5–52.5)
HCT VFR BLD CALC: 35.7 % (ref 40.5–52.5)
HEMOGLOBIN: 11.1 G/DL (ref 13.5–17.5)
HEMOGLOBIN: 11.3 G/DL (ref 13.5–17.5)
HEMOGLOBIN: 11.4 G/DL (ref 13.5–17.5)
IRON SATURATION: 26 % (ref 20–50)
IRON: 71 UG/DL (ref 59–158)
MCH RBC QN AUTO: 29.7 PG (ref 26–34)
MCHC RBC AUTO-ENTMCNC: 31.7 G/DL (ref 31–36)
MCV RBC AUTO: 93.5 FL (ref 80–100)
PDW BLD-RTO: 15.7 % (ref 12.4–15.4)
PLATELET # BLD: 218 K/UL (ref 135–450)
PMV BLD AUTO: 8.7 FL (ref 5–10.5)
POTASSIUM REFLEX MAGNESIUM: 3.7 MMOL/L (ref 3.5–5.1)
PROCALCITONIN: 0.12 NG/ML (ref 0–0.15)
RBC # BLD: 3.82 M/UL (ref 4.2–5.9)
SODIUM BLD-SCNC: 137 MMOL/L (ref 136–145)
TOTAL IRON BINDING CAPACITY: 274 UG/DL (ref 260–445)
URINE CULTURE, ROUTINE: NORMAL
WBC # BLD: 9.8 K/UL (ref 4–11)

## 2020-11-02 PROCEDURE — 6370000000 HC RX 637 (ALT 250 FOR IP): Performed by: INTERNAL MEDICINE

## 2020-11-02 PROCEDURE — 6360000002 HC RX W HCPCS: Performed by: NURSE ANESTHETIST, CERTIFIED REGISTERED

## 2020-11-02 PROCEDURE — 3609010600 HC COLONOSCOPY POLYPECTOMY SNARE/COLD BIOPSY: Performed by: INTERNAL MEDICINE

## 2020-11-02 PROCEDURE — 2580000003 HC RX 258: Performed by: NURSE ANESTHETIST, CERTIFIED REGISTERED

## 2020-11-02 PROCEDURE — 80048 BASIC METABOLIC PNL TOTAL CA: CPT

## 2020-11-02 PROCEDURE — 88305 TISSUE EXAM BY PATHOLOGIST: CPT

## 2020-11-02 PROCEDURE — 85027 COMPLETE CBC AUTOMATED: CPT

## 2020-11-02 PROCEDURE — C9113 INJ PANTOPRAZOLE SODIUM, VIA: HCPCS | Performed by: INTERNAL MEDICINE

## 2020-11-02 PROCEDURE — 85379 FIBRIN DEGRADATION QUANT: CPT

## 2020-11-02 PROCEDURE — 94761 N-INVAS EAR/PLS OXIMETRY MLT: CPT

## 2020-11-02 PROCEDURE — 94640 AIRWAY INHALATION TREATMENT: CPT

## 2020-11-02 PROCEDURE — 6360000002 HC RX W HCPCS: Performed by: INTERNAL MEDICINE

## 2020-11-02 PROCEDURE — 82728 ASSAY OF FERRITIN: CPT

## 2020-11-02 PROCEDURE — 7100000010 HC PHASE II RECOVERY - FIRST 15 MIN: Performed by: INTERNAL MEDICINE

## 2020-11-02 PROCEDURE — 85014 HEMATOCRIT: CPT

## 2020-11-02 PROCEDURE — 85384 FIBRINOGEN ACTIVITY: CPT

## 2020-11-02 PROCEDURE — 3700000000 HC ANESTHESIA ATTENDED CARE: Performed by: INTERNAL MEDICINE

## 2020-11-02 PROCEDURE — 2500000003 HC RX 250 WO HCPCS: Performed by: NURSE ANESTHETIST, CERTIFIED REGISTERED

## 2020-11-02 PROCEDURE — 7100000011 HC PHASE II RECOVERY - ADDTL 15 MIN: Performed by: INTERNAL MEDICINE

## 2020-11-02 PROCEDURE — 2709999900 HC NON-CHARGEABLE SUPPLY: Performed by: INTERNAL MEDICINE

## 2020-11-02 PROCEDURE — 2700000000 HC OXYGEN THERAPY PER DAY

## 2020-11-02 PROCEDURE — 3700000001 HC ADD 15 MINUTES (ANESTHESIA): Performed by: INTERNAL MEDICINE

## 2020-11-02 PROCEDURE — 94660 CPAP INITIATION&MGMT: CPT

## 2020-11-02 PROCEDURE — 84145 PROCALCITONIN (PCT): CPT

## 2020-11-02 PROCEDURE — 85018 HEMOGLOBIN: CPT

## 2020-11-02 PROCEDURE — 0DBL8ZZ EXCISION OF TRANSVERSE COLON, VIA NATURAL OR ARTIFICIAL OPENING ENDOSCOPIC: ICD-10-PCS | Performed by: INTERNAL MEDICINE

## 2020-11-02 PROCEDURE — 2060000000 HC ICU INTERMEDIATE R&B

## 2020-11-02 PROCEDURE — 2580000003 HC RX 258: Performed by: INTERNAL MEDICINE

## 2020-11-02 PROCEDURE — 36415 COLL VENOUS BLD VENIPUNCTURE: CPT

## 2020-11-02 RX ORDER — KETAMINE HYDROCHLORIDE 50 MG/ML
INJECTION, SOLUTION, CONCENTRATE INTRAMUSCULAR; INTRAVENOUS PRN
Status: DISCONTINUED | OUTPATIENT
Start: 2020-11-02 | End: 2020-11-02 | Stop reason: SDUPTHER

## 2020-11-02 RX ORDER — SODIUM CHLORIDE, SODIUM LACTATE, POTASSIUM CHLORIDE, CALCIUM CHLORIDE 600; 310; 30; 20 MG/100ML; MG/100ML; MG/100ML; MG/100ML
INJECTION, SOLUTION INTRAVENOUS CONTINUOUS PRN
Status: DISCONTINUED | OUTPATIENT
Start: 2020-11-02 | End: 2020-11-02 | Stop reason: SDUPTHER

## 2020-11-02 RX ORDER — GLYCOPYRROLATE 0.2 MG/ML
INJECTION INTRAMUSCULAR; INTRAVENOUS PRN
Status: DISCONTINUED | OUTPATIENT
Start: 2020-11-02 | End: 2020-11-02 | Stop reason: SDUPTHER

## 2020-11-02 RX ORDER — PROPOFOL 10 MG/ML
INJECTION, EMULSION INTRAVENOUS CONTINUOUS PRN
Status: DISCONTINUED | OUTPATIENT
Start: 2020-11-02 | End: 2020-11-02 | Stop reason: SDUPTHER

## 2020-11-02 RX ORDER — ONDANSETRON 2 MG/ML
4 INJECTION INTRAMUSCULAR; INTRAVENOUS
Status: DISCONTINUED | OUTPATIENT
Start: 2020-11-02 | End: 2020-11-02 | Stop reason: HOSPADM

## 2020-11-02 RX ORDER — OLANZAPINE 10 MG/1
5 INJECTION, POWDER, LYOPHILIZED, FOR SOLUTION INTRAMUSCULAR EVERY 4 HOURS PRN
Status: DISCONTINUED | OUTPATIENT
Start: 2020-11-02 | End: 2020-11-05 | Stop reason: HOSPADM

## 2020-11-02 RX ADMIN — PANTOPRAZOLE SODIUM 40 MG: 40 INJECTION, POWDER, FOR SOLUTION INTRAVENOUS at 00:06

## 2020-11-02 RX ADMIN — PHENYLEPHRINE HYDROCHLORIDE 100 MCG: 10 INJECTION, SOLUTION INTRAMUSCULAR; INTRAVENOUS; SUBCUTANEOUS at 17:53

## 2020-11-02 RX ADMIN — PANTOPRAZOLE SODIUM 40 MG: 40 INJECTION, POWDER, FOR SOLUTION INTRAVENOUS at 11:44

## 2020-11-02 RX ADMIN — BISACODYL 10 MG: 5 TABLET, COATED ORAL at 20:40

## 2020-11-02 RX ADMIN — KETAMINE HYDROCHLORIDE 50 MG: 50 INJECTION, SOLUTION INTRAMUSCULAR; INTRAVENOUS at 17:49

## 2020-11-02 RX ADMIN — BISACODYL 10 MG: 5 TABLET, COATED ORAL at 08:17

## 2020-11-02 RX ADMIN — SODIUM CHLORIDE, SODIUM LACTATE, POTASSIUM CHLORIDE, AND CALCIUM CHLORIDE: 600; 310; 30; 20 INJECTION, SOLUTION INTRAVENOUS at 17:36

## 2020-11-02 RX ADMIN — CARVEDILOL 25 MG: 25 TABLET, FILM COATED ORAL at 22:01

## 2020-11-02 RX ADMIN — PROPOFOL 150 MCG/KG/MIN: 10 INJECTION, EMULSION INTRAVENOUS at 17:40

## 2020-11-02 RX ADMIN — CARVEDILOL 25 MG: 25 TABLET, FILM COATED ORAL at 08:17

## 2020-11-02 RX ADMIN — IPRATROPIUM BROMIDE AND ALBUTEROL 1 PUFF: 20; 100 SPRAY, METERED RESPIRATORY (INHALATION) at 20:44

## 2020-11-02 RX ADMIN — ACETAMINOPHEN 650 MG: 325 TABLET ORAL at 20:40

## 2020-11-02 RX ADMIN — Medication 10 ML: at 09:29

## 2020-11-02 RX ADMIN — GLYCOPYRROLATE 0.4 MG: 0.2 INJECTION, SOLUTION INTRAMUSCULAR; INTRAVENOUS at 17:38

## 2020-11-02 RX ADMIN — IPRATROPIUM BROMIDE AND ALBUTEROL 1 PUFF: 20; 100 SPRAY, METERED RESPIRATORY (INHALATION) at 08:50

## 2020-11-02 RX ADMIN — Medication 10 ML: at 00:06

## 2020-11-02 RX ADMIN — BISACODYL 10 MG: 5 TABLET, COATED ORAL at 15:47

## 2020-11-02 RX ADMIN — Medication 10 ML: at 11:44

## 2020-11-02 RX ADMIN — KETAMINE HYDROCHLORIDE 100 MG: 50 INJECTION, SOLUTION INTRAMUSCULAR; INTRAVENOUS at 17:40

## 2020-11-02 ASSESSMENT — PAIN SCALES - PAIN ASSESSMENT IN ADVANCED DEMENTIA (PAINAD)
CONSOLABILITY: 0
NEGVOCALIZATION: 0
NEGVOCALIZATION: 0
TOTALSCORE: 0
CONSOLABILITY: 0
NEGVOCALIZATION: 0
BREATHING: 0
FACIALEXPRESSION: 0
BODYLANGUAGE: 0
BREATHING: 0
CONSOLABILITY: 0
CONSOLABILITY: 0
FACIALEXPRESSION: 0
TOTALSCORE: 0
BODYLANGUAGE: 0
BODYLANGUAGE: 0
TOTALSCORE: 0
BODYLANGUAGE: 0
TOTALSCORE: 0
BREATHING: 0
BREATHING: 0
NEGVOCALIZATION: 0
FACIALEXPRESSION: 0
FACIALEXPRESSION: 0

## 2020-11-02 ASSESSMENT — PAIN SCALES - GENERAL
PAINLEVEL_OUTOF10: 0

## 2020-11-02 ASSESSMENT — PULMONARY FUNCTION TESTS
PIF_VALUE: 1
PIF_VALUE: 2
PIF_VALUE: 1
PIF_VALUE: 2
PIF_VALUE: 1
PIF_VALUE: 2
PIF_VALUE: 1
PIF_VALUE: 2
PIF_VALUE: 1
PIF_VALUE: 1
PIF_VALUE: 2
PIF_VALUE: 1
PIF_VALUE: 2
PIF_VALUE: 1
PIF_VALUE: 1
PIF_VALUE: 2
PIF_VALUE: 1
PIF_VALUE: 1
PIF_VALUE: 2
PIF_VALUE: 1
PIF_VALUE: 2

## 2020-11-02 ASSESSMENT — PAIN - FUNCTIONAL ASSESSMENT
PAIN_FUNCTIONAL_ASSESSMENT: FACES

## 2020-11-02 NOTE — PROGRESS NOTES
REPORT GIVEN TO UNIT NURSE ON PATIENT KANCHAN AWAKE AND VERBAL NO O2 ASSISTANCE. WAITING FOR TRANSPORT TO COME AND TAKE PATIENT BACK TO UNIT.

## 2020-11-02 NOTE — BRIEF OP NOTE
Brief Postoperative Note      Patient: Vernelle Ahumada  YOB: 1955  MRN: [id number]    Date of Procedure: 11/2/2020    Pre-Op Diagnosis: RECTAL BLEED    Post-Op Diagnosis: Same       Procedure(s):  COLONOSCOPY POLYPECTOMY SNARE/COLD BIOPSY    Surgeon(s):  Mariela España MD    Assistant:  * No surgical staff found *    Anesthesia: Monitor Anesthesia Care    Estimated Blood Loss (mL): Minimal    Complications: None    Specimens:   ID Type Source Tests Collected by Time Destination   A : TRANSVERSE COLON POLYP COLD SNARE Tissue Colon SURGICAL PATHOLOGY Mariela España MD 11/2/2020 1748        Implants:  * No implants in log *      Drains:   Urethral Catheter 16 fr (Active)   Catheter Indications Acute urinary retention/obstruction 11/02/20 1540   Securement Device Date Changed 10/31/20 10/31/20 1630   Site Assessment No urethral drainage 11/02/20 1540   Urine Color Yellow 11/02/20 1540   Urine Appearance Clear 11/02/20 1540   Urine Odor Other (Comment) 11/02/20 0645   Output (mL) 150 mL 11/02/20 1352       [REMOVED] External Urinary Catheter (Removed)   Output (mL) 600 mL 10/07/20 0950       Findings: 1. Hemmorrhoids with prior hemmorrhoidal bleeding. Colon polyp removed. No active LGI bleeding.     Electronically signed by Priscilla Billings MD on 11/2/2020 at 6:07 PM

## 2020-11-02 NOTE — PROGRESS NOTES
Consent obtained from sister, Mando Braun, for colonoscopy. Sister also voiced concerns over pts psych meds being held. She stated that when we take him off the meds he has extreme behavioral issues. She further went on to say that she agrees he does appear sleepy on them but thinks hes just bored at the nursing home and its better than the alternative of him acting out. This RN stated that I would make MDs aware. Will continue to monitor.

## 2020-11-02 NOTE — PROGRESS NOTES
Progress Note    Admit Date: 10/30/2020    Patient's PCP: Dr. Evelin Liriano MD       76 y.o. male with past medical history of CAD, hypertension, diabetes mellitus, JONATHAN, paranoid schizophrenia who presented to the ED with rectal bleeding. Recently admitted 10/7/2020 to 10/16/2020 for  AMS, and noted with  Sepsis secondary to [address]. Was initially on vancomycin and meropenem x 4 days, as well as acyclovir for possible meningitis. Cx data negative and CSF suggestive of aseptic lymphocytic meningitis. Meningitis encephalitis panel negative. CTPA was negative for PE. +small air space opacities. He was treated with Remdesivir. His Acute metabolic encephalopathy was felt likely secondary to Covid19/aseptic meningitis as well as underlying schizophrenia, and improved at time of discharge to facility, from where he was brought to the Ed due to concern for rectal bleeding. In the ED, he was afebrile. Sats were 88-94% and he required supplemental oxygen. Labs showed stable hgb 11.2. MCV was 95. Other labs were satisfactory. CXR showed Pulmonary vascular congestion without localized consolidation in this patient with low lung volumes. There was concern for GI bleed, on account of which he was admitted for further evaluation and management. Interval HPI  No new complaints  more awake today  Looks calm    No fever  No chest pain      Medications : Reviewed        PHYSICAL EXAM:  /80   Pulse 74   Temp 100 °F (37.8 °C) (Oral)   Resp 12   Ht 5' 10\" (1.778 m)   Wt 212 lb 8.4 oz (96.4 kg)   SpO2 94%   BMI 30.49 kg/m²     No results for input(s): POCGLU in the last 72 hours. General appearance:  NAD Obese  Heart[de-identified] Normal s1/s2, RRR, no murmurs, gallops, or rubs. No leg edema  Lungs:  Diminished breath sounds brenna at bases.   Abdomen: Soft, non-tender, non-distended, bowel sounds present, no masses  Musculoskeletal:  No clubbing, no cyanosis, or edema  Skin: No lesion or masses  Neurologic: Grossly non-focal.  Psychiatric:  Confused. No gross focal deficits. LABS:  Recent Labs     11/01/20  0804 11/01/20  1318 11/01/20  2240 11/02/20  1130   WBC 5.4  --   --  9.8   HGB 10.8* 11.2* 11.8* 11.3*  11.4*   HCT 33.9* 35.3* 37.0* 35.7*  35.7*     --   --  218                                                                  Recent Labs     10/31/20  0431 11/01/20  0804 11/02/20  0427    139 137   K 4.5 3.7 3.7    98* 98*   CO2 30 32 30   BUN 17 17 14   CREATININE 1.4* 1.4* 1.3   GLUCOSE 122* 119* 117*     No results for input(s): AST, ALT, ALB, BILITOT, ALKPHOS in the last 72 hours. No results for input(s): TROPONINI in the last 72 hours. No results for input(s): BNP in the last 72 hours. Lab Results   Component Value Date    PHART 7.384 11/01/2020    OMB5NGD 54.7 11/01/2020    PO2ART 65.6 11/01/2020     No results for input(s): INR in the last 72 hours. Recent Labs     10/31/20  1443   COLORU Yellow   PHUR 6.0   WBCUA 6-9*   RBCUA 11-20*   BACTERIA Rare*   CLARITYU SL CLOUDY*   SPECGRAV 1.025   LEUKOCYTESUR SMALL*   UROBILINOGEN 0.2   BILIRUBINUR Negative   BLOODU LARGE*   GLUCOSEU Negative            Echo 2018:  Normal left ventricle size. There is mild to moderate concentric left   ventricular hypertrophy. Overall left ventricular systolic function appears   moderately reduced with an ejection fraction visually estimated at 30-35%. The apex appears akinetic. Morris septum is hypokinetic. Diastolic filling parameters suggest grade II diastolic dysfunction. Mild mitral regurgitation is present. Trace aortic valve regurgitation. Mild tricuspid regurgitation. Estimated pulmonary artery systolic pressure is 29 mmHg assuming a right   atrial pressure of 8 mmHg. The right atrium is dilated. Pacer / ICD wire is visualized in the right ventricle.         Assessment & Plan:    76 y.o. male with past medical history of CAD, hypertension, diabetes mellitus, JONATHAN, paranoid schizophrenia who presented to the ED with rectal bleeding. Acute GI bleed  - Concern for GI bleed  - Pxt was on Mobic at home: Held. - Red blood noted in Ed per ED physician  - Hgb looks stable c/f recent  - Clears  - Protonix  - Typed and screen blood  - Trend hgb: will decrease freq. - GI consulted; colonoscopy planned           Acute encephalopathy  - Possibly toxic encephalopathy related to meds  - On decent dose of Olanzapine. Also on trileptal, thorazine  - Evaluate for other etiology, like infections or other including metabolic encephalopathy related to hypercapnia from above and JONATHAN not been using CPAP at facility due to recent COVID.  - COVID testing positive.  - NH3: Not signif elevated  - Routine labs appear satisfactory  - Was decently evaluated during recent admission for mental status changes  - Hold olanzapine and trileptal and thorazine until mental status improves  - If no other etiology found for his somnolence as apparent, will need psych eval and review of these meds prior to resuming. - Psych consult  - Supportive care.          Recent COVID PNA with Respiratory failure, Sepsis,  Encephalopathy and Aseptic meningitis  Admitted 10/7/2020 to 10/16/2020   - Diagnosed then and treated for COVID  - Was treated with on decadron, remdesivir  - Continue supportive care  - Recheck COVID labs         Mild acute Chronic systolic heart failure  - Poor historian based on psych hx  - Required oxygen on presentation in the ED  - CXR repeat: improved.    - Does not appear to be on home diuretics, received fluids last admission  - Pro BNP, 300s; improved with lasix   - Update echo   - Dly weights, I/O monitoring          CAD/ishcemic cardiomyopathy  - Has ICD  - Holding lisinopril for now  - Continue Coreg resumed  - Telemetry           JONATHAN  - Placed on BiPAP in ED.   - Pxt is supposed to use BiPAP but reportedly due to recent COVID status, was not being used in the facility  - Continue nightly BiPAP/oxygen           JOSÉ   - Holding Lisinopril/HCTZ  - Monitor creatinine  - Nephrology consulted        Sick sinus syndrome:  Status post pacemaker placement        Diabetes mellitus type 2:  Monitor blood glucose , sliding scale if needed        Paranoid schizophrenia:  Oxcarbazepine and chlorpromazine  Pxt appears drowsy: pharmacy to verify hx/doses; hold for now                 Full Code     PT/OT: ordered. Disposition: in pxt.  Colonoscopy   Pxt is Long Term Care (LTC): [facility] in [place] MD

## 2020-11-02 NOTE — CARE COORDINATION
Case Management Assessment           Initial Evaluation                Date / Time of Evaluation: 11/2/2020 10:04 AM                 Assessment Completed by: Romulo Gama    Patient Name: Edith Nava     YOB: 1955  Diagnosis: JOSÉ (acute kidney injury) ([facility] [address] [N17.9]  JOSÉ (acute kidney injury) [facility]) [N17.9]     Date / Time: 10/30/2020  7:20 AM    Patient Admission Status: Inpatient    If patient is discharged prior to next notation, then this note serves as note for discharge by case management. Current PCP: Demetria Kwong MD  Clinic Patient: No    Chart Reviewed: Yes  Patient/ Family Interviewed: Yes    Initial assessment completed at bedside with: pt's sisterMarta    Hospitalization in the last 30 days: Yes    Emergency Contacts:  Extended Emergency Contact Information  Primary Emergency Contact: Raven Chatman  Address: [facility] Phone: [phone]  Work Phone: [phone]  Relation: Brother/Sister    Advance Directives:   Code Status: Full [address]: Yes  Agent: Maile Santana  Contact Number: [phone]    Copy present: Yes     In paper Chart: No    Scanned into EMR Yes    Financial  Payor: MEDICARE / Plan: MEDICARE PART A AND B / Product Type: *No Product type* /     Pre-cert required for SNF: No    Pharmacy    [place] [address], [address] [address]  Phone: [phone] Fax: [phone]    [facility]/pharmacy #[id number]- [address] [phone] - F [phone]  [address] [place] [address]  Phone: [phone] Fax: [phone]      Potential assistance Purchasing Medications:    Does Patient want to participate in local refill/ meds to beds program?:      Meds To Beds General Rules:  1. Can ONLY be done Monday- Friday between 8:30am-5pm  2.  Prescription(s) must be in pharmacy by 3pm to be filled same [facility]) [N17.9]    The Patient and/or patient representative Bang Nina and his family were provided with a choice of provider and agrees with the discharge plan Yes    Freedom of choice list was provided with basic dialogue that supports the patient's individualized plan of care/goals and shares the quality data associated with the providers.  Yes    Care Transition patient: No    Demarcus Bui RN  [facility] [employer]  Case Management Department  Ph: [phone]   Fax: [phone]

## 2020-11-02 NOTE — PLAN OF CARE
Problem: Airway Clearance - Ineffective  Goal: Achieve or maintain patent airway  Outcome: Ongoing  Note: Pts sats remained WDL on 2.5L, bipap on as tolerated. Will continue to monitor. Problem: Mental Status - Impaired:  Goal: Mental status will improve  Description: Mental status will improve  Outcome: Ongoing  Note: Pt mentation ranges from A&O x2-3. Easily reoriented. Will continue to monitor.

## 2020-11-02 NOTE — PROGRESS NOTES
Nephrology  Note                                                                                                                                                                                                                                                                                                                                                               Office : [phone]     Fax :[phone]              Patient's Name: Vernelle Ahumada  11/2/2020    Good UO   Cr stable   On Oxygen   BP stable       Past Medical History:   Diagnosis Date    Anemia     CAD (coronary artery disease)     Depression     [facility] ([facility] [address]     GERD (gastroesophageal reflux disease)     Hypertension     Left anterior fascicular block     JONATHAN (obstructive sleep apnea)     Pacemaker     Paranoid schizophrenia ([facility]     Pneumonia due to 2019-nCoV 10/10/2020    Rash     Right bundle branch block     Sinus bradycardia     Sinusitis        Past Surgical History:   Procedure Laterality Date    PACEMAKER PLACEMENT         Family History   Problem Relation Age of Onset    No Known Problems Mother     No Known Problems Father         reports that he has never smoked. He has never used smokeless tobacco. He reports that he does not drink alcohol or use drugs.     Allergies:  Cephalosporins; Lorazepam; and Pcn [penicillins]    Current Medications:    albuterol-ipratropium (COMBIVENT RESPIMAT)  MCG/ACT inhaler 1 puff, Q4H PRN  sodium chloride flush 0.9 % injection 10 mL, 2 times per day  sodium chloride flush 0.9 % injection 10 mL, PRN  acetaminophen (TYLENOL) tablet 650 mg, Q6H PRN    Or  acetaminophen (TYLENOL) suppository 650 mg, Q6H PRN  promethazine (PHENERGAN) tablet 12.5 mg, Q6H PRN    Or  ondansetron (ZOFRAN) injection 4 mg, Q6H PRN  pantoprazole (PROTONIX) injection 40 mg, Q12H    And  sodium chloride (PF) 0.9 % injection 10 mL, Q12H  carvedilol (COREG) tablet 25 mg, BID WC  [Held by provider] chlorproMAZINE (THORAZINE) tablet 100 mg, BID  methocarbamol (ROBAXIN) tablet 500 mg, TID PRN  [Held by provider] OLANZapine (ZYPREXA) tablet 15 mg, BID  [Held by provider] OXcarbazepine (TRILEPTAL) tablet 300 mg, TID  perflutren lipid microspheres (DEFINITY) injection 1.65 mg, ONCE PRN  labetalol (NORMODYNE;TRANDATE) injection 5 mg, Q6H PRN  albuterol-ipratropium (COMBIVENT RESPIMAT)  MCG/ACT inhaler 1 puff, BID  bisacodyl (DULCOLAX) EC tablet 10 mg, 4x Daily        Review of Systems:   14 point ROS obtained but were negative except mentioned in HPI      Physical exam:     Vitals:  /80   Pulse 74   Temp 100 °F (37.8 °C) (Oral)   Resp 12   Ht 5' 10\" (1.778 m)   Wt 212 lb 8.4 oz (96.4 kg)   SpO2 94%   BMI 30.49 kg/m²   Constitutional:  OAA X3 NAD  Skin: no rash, turgor wnl  Heent:  eomi, mmm  Neck: no bruits or jvd noted  Cardiovascular:  S1, S2 without m/r/g  Respiratory: CTA B without w/r/r  Abdomen:  +bs, soft, nt, nd  Ext: + lower extremity edema  Psychiatric: mood and affect appropriate  Musculoskeletal:  Rom, muscular strength intact    Data:   Labs:  CBC:   Recent Labs     11/01/20  0804 11/01/20  1318 11/01/20  2240 11/02/20  1130   WBC 5.4  --   --  9.8   HGB 10.8* 11.2* 11.8* 11.3*  11.4*     --   --  218     BMP:    Recent Labs     10/31/20  0431 11/01/20  0804 11/02/20  0427    139 137   K 4.5 3.7 3.7    98* 98*   CO2 30 32 30   BUN 17 17 14   CREATININE 1.4* 1.4* 1.3   GLUCOSE 122* 119* 117*     Ca/Mg/Phos:   Recent Labs     10/31/20  0431 11/01/20  0804 11/02/20  0427   CALCIUM 10.2 9.7 9.8     Hepatic: No results for input(s): AST, ALT, ALB, BILITOT, ALKPHOS in the last 72 hours. Troponin:   No results for input(s): TROPONINI in the last 72 hours. BNP: No results for input(s): BNP in the last 72 hours. Lipids: No results for input(s): CHOL, TRIG, HDL, LDLCALC, LABVLDL in the last 72 hours.   ABGs:   Recent Labs     11/01/20  1306   PHART 7.384   PO2ART 65.6*   [id number] 54.7*     INR:   No results for input(s): INR in the last 72 hours. UA:  Recent Labs     10/31/20  1443   COLORU Yellow   CLARITYU SL CLOUDY*   GLUCOSEU Negative   BILIRUBINUR Negative   KETUA Negative   SPECGRAV 1.025   BLOODU LARGE*   PHUR 6.0   PROTEINU TRACE*   UROBILINOGEN 0.2   NITRU Negative   LEUKOCYTESUR SMALL*   LABMICR YES   URINETYPE Voided      Urine Microscopic:   Recent Labs     10/31/20  1443   BACTERIA Rare*   COMU see below   WBCUA 6-9*   RBCUA 11-20*     Urine Culture:   Recent Labs     10/31/20  1426   LABURIN No growth at 18 to 36 hours     Urine Chemistry: No results for input(s): CLUR, LABCREA, PROTEINUR, NAUR in the last 72 hours. IMAGING:  XR CHEST PORTABLE   Final Result      Cardiomegaly. Increased density in the left base. Improved overall pulmonary congestion since the prior study. Right lung is fairly clear. XR CHEST PORTABLE   Final Result      1. Stable cardiomegaly with multilead left-sided pacing device. 2.  Pulmonary vascular congestion without localized consolidation in this patient with low lung volumes.               JOSÉ   GIB   CHF - EF 35 percent   COVID 19   JONATHAN  CAD     Plan   - Hold ACE/HCTZ   - Ur studies - reviewed   - Hold lasix today   - COVID +   - labs in am     Recommend to dose adjust all medications  based on renal functions  Maintain SBP> 90 mmHg   Daily weights   AVOID NSAIDs  Avoid Nephrotoxins  Monitor Intake/Output  Call if significant decrease in urine output           Thank you for allowing us to participate in care of [address] [place], [place] [address]y 6 free to contact me   [facility] of [address]  Office : [phone]  Fax :[phone]

## 2020-11-02 NOTE — ANESTHESIA PRE PROCEDURE
Department of Anesthesiology  Preprocedure Note       Name:  Hannah Hernandez   Age:  76 y.o.  :  1955                                          MRN:  [id number]         Date:  2020      Surgeon: Harriet Dominguez):  Magalys Judge MD    Procedure: Procedure(s):  COLONOSCOPY POLYPECTOMY SNARE/COLD BIOPSY    Medications prior to admission:   Prior to Admission medications    Medication Sig Start Date End Date Taking?  Authorizing Provider   lisinopril-hydroCHLOROthiazide (PRINZIDE;ZESTORETIC) 20-12.5 MG per tablet Take 1 tablet by mouth daily   Yes Historical Provider, MD   meloxicam (MOBIC) 7.5 MG tablet Take 7.5 mg by mouth daily   Yes Historical Provider, MD   lisinopril (PRINIVIL;ZESTRIL) 20 MG tablet Take 1 tablet by mouth daily 10/17/20  Yes Patricia Parker DO   methocarbamol (ROBAXIN) 500 MG tablet Take 1 tablet by mouth 3 times daily as needed (muscle spasms) 10/16/20  Yes Patricia Parker DO   chlorproMAZINE (THORAZINE) 100 MG tablet Take 100 mg by mouth 2 times daily   Yes Historical Provider, MD   polyethylene glycol (MIRALAX) 17 g PACK packet Take 17 g by mouth as needed   Yes Historical Provider, MD   OXcarbazepine (TRILEPTAL) 300 MG tablet Take 300 mg by mouth 3 times daily   Yes Historical Provider, MD   calcium carbonate (TUMS) 500 MG chewable tablet Take 2 tablets by mouth 2 times daily as needed for Heartburn   Yes Historical Provider, MD   ipratropium-albuterol (DUONEB) 0.5-2.5 (3) MG/3ML SOLN nebulizer solution Inhale 3 mLs into the lungs every 4 hours as needed for Shortness of Breath 19  Yes Jaz Oates MD   ipratropium-albuterol (DUONEB) 0.5-2.5 (3) MG/3ML SOLN nebulizer solution Inhale 3 mLs into the lungs 2 times daily 19  Yes Jaz Oates MD   fluocinonide (LIDEX) 0.05 % external solution Apply to scalp topically as needed   Yes Historical Provider, MD   ketoconazole (NIZORAL) 2 % shampoo Apply topically Twice a Week Apply to scalp every night shift on Tues and Saturday -- injection 4 mg  4 mg Intravenous Q6H PRN Sameera Li MD        pantoprazole (PROTONIX) injection 40 mg  40 mg Intravenous Q12H Sameera Li MD   40 mg at 11/02/20 1144    And    sodium chloride (PF) 0.9 % injection 10 mL  10 mL Intravenous Q12H Sameera Li MD   10 mL at 11/02/20 1144    carvedilol (COREG) tablet 25 mg  25 mg Oral BID WC Sameera Li MD   25 mg at 11/02/20 0817    [Held by provider] chlorproMAZINE (THORAZINE) tablet 100 mg  100 mg Oral BID Sameera Li MD   100 mg at 11/01/20 8133    methocarbamol (ROBAXIN) tablet 500 mg  500 mg Oral TID PRN Sameera Li MD        [Held by provider] OLANZapine (ZYPREXA) tablet 15 mg  15 mg Oral BID Sameera Li MD   15 mg at 11/01/20 0820    [Held by provider] OXcarbazepine (TRILEPTAL) tablet 300 mg  300 mg Oral TID Sameera Li MD   300 mg at 11/01/20 0820    perflutren lipid microspheres (DEFINITY) injection 1.65 mg  1.5 mL Intravenous ONCE PRN Sameera Li MD        labetalol (NORMODYNE;TRANDATE) injection 5 mg  5 mg Intravenous Q6H PRN Sameera Li MD        albuterol-ipratropium (COMBIVENT RESPIMAT)  MCG/ACT inhaler 1 puff  1 puff Inhalation BID Sameera Li MD   1 puff at 11/02/20 0850    bisacodyl (DULCOLAX) EC tablet 10 mg  10 mg Oral 4x Daily Victoria Dunn MD   10 mg at 11/02/20 1547     Facility-Administered Medications Ordered in Other Encounters   Medication Dose Route Frequency Provider Last Rate Last Dose    propofol injection    Continuous PRN BROOKLYN Eckert CRNA 43.4 mL/hr at 11/02/20 1747 75 mcg/kg/min at 11/02/20 1747    ketamine (KETALAR) injection    PRN Jamesus BROOKLYN Watson - CRNA   50 mg at 11/02/20 1749    lactated ringers infusion    Continuous PRN Jamesus TEJ WatsonN - CRNA        phenylephrine (LORY-SYNEPHRINE) injection    PRN BROOKLYN Eckert CRNA   100 mcg at 11/02/20 1753    glycopyrrolate injection    PRN BROOKLYN Eckert CRNA   0.4 mg at 11/02/20 1738       Allergies: Allergies   Allergen Reactions    Cephalosporins     Lorazepam     Pcn [Penicillins]        Problem List:    Patient Active Problem List   Diagnosis Code    Respiratory failure ([facility] J96.90    Hypoxia R09.02    JONATHAN (obstructive sleep apnea) G47.33    Sepsis ([facility] A41.9    Fever R50.9    Encephalopathy G93.40    Aseptic meningitis G03.0    Pneumonia due to 2019-nCoV U07.1, J12.89    JOSÉ (acute kidney injury) ([facility] N17.9       Past Medical History:        Diagnosis Date    Anemia     CAD (coronary artery disease)     Depression     Diabetes mellitus (HCC)     GERD (gastroesophageal reflux disease)     Hypertension     Left anterior fascicular block     JONATHAN (obstructive sleep apnea)     Pacemaker     Paranoid schizophrenia ([facility]     Pneumonia due to 2019-nCoV 10/10/2020    Rash     Right bundle branch block     Sinus bradycardia     Sinusitis        Past Surgical History:        Procedure Laterality Date    PACEMAKER PLACEMENT         Social History:    Social History     Tobacco Use    Smoking status: Never Smoker    Smokeless tobacco: Never Used   Substance Use Topics    Alcohol use: No                                Counseling given: Not Answered      Vital Signs (Current):   Vitals:    11/02/20 0852 11/02/20 1133 11/02/20 1212 11/02/20 1540   BP:  111/80  113/77   Pulse:  74  76   Resp:  16 12 15   Temp:  100 °F (37.8 °C)  99.2 °F (37.3 °C)   TempSrc:  Oral  Oral   SpO2: 95% 94%  97%   Weight:       Height:                                                  BP Readings from Last 3 Encounters:   11/02/20 113/77   11/02/20 93/61   10/16/20 (!) 135/90       NPO Status:                                                                                 BMI:   Wt Readings from Last 3 Encounters:   11/02/20 212 lb 8.4 oz (96.4 kg)   10/16/20 232 lb 3.2 oz (105.3 kg)   12/09/19 243 lb (110.2 kg)     Body mass index is 30.49 kg/m².     CBC:   Lab Results   Component Value Date    WBC 9.8 11/02/2020    RBC 3.82 11/02/2020    HGB 11.4 11/02/2020    HGB 11.3 11/02/2020    HCT 35.7 11/02/2020    HCT 35.7 11/02/2020    MCV 93.5 11/02/2020    RDW 15.7 11/02/2020     11/02/2020       CMP:   Lab Results   Component Value Date     11/02/2020    K 3.7 11/02/2020    CL 98 11/02/2020    CO2 30 11/02/2020    BUN 14 11/02/2020    CREATININE 1.3 11/02/2020    GFRAA >60 11/02/2020    GFRAA 36 01/01/2010    AGRATIO 0.8 10/16/2020    LABGLOM 55 11/02/2020    GLUCOSE 117 11/02/2020    PROT 6.8 10/16/2020    PROT 7.1 01/01/2010    CALCIUM 9.8 11/02/2020    BILITOT 0.3 10/16/2020    ALKPHOS 61 10/16/2020    AST 17 10/16/2020    ALT 24 10/16/2020       POC Tests: No results for input(s): POCGLU, POCNA, POCK, POCCL, POCBUN, POCHEMO, POCHCT in the last 72 hours.     Coags:   Lab Results   Component Value Date    PROTIME 10.9 10/30/2020    PROTIME 13.6 01/01/2010    INR 0.94 10/30/2020    APTT 22.1 10/30/2020       HCG (If Applicable): No results found for: PREGTESTUR, PREGSERUM, HCG, HCGQUANT     ABGs:   Lab Results   Component Value Date    PHART 7.384 11/01/2020    PO2ART 65.6 11/01/2020    OLE3HAU 54.7 11/01/2020    UMO5HKT 32 11/01/2020    BEART 6.0 11/01/2020    P3TASNLN 93 11/01/2020        Type & Screen (If Applicable):  No results found for: LABABO, [address]    Drug/Infectious Status (If Applicable):  Lab Results   Component Value Date    HEPCAB Non-Reactive (Negative) 01/01/2010       COVID-19 Screening (If Applicable):   Lab Results   Component Value Date    COVID19 DETECTED 10/30/2020         Anesthesia Evaluation  Patient summary reviewed and Nursing notes reviewed  Airway: Mallampati: II  TM distance: >3 FB   Neck ROM: full  Mouth opening: > = 3 FB Dental: normal exam         Pulmonary:normal exam    (+) pneumonia: no interval change,  sleep apnea: on CPAP,                             Cardiovascular:    (+) hypertension: mild, pacemaker: no interval change, CAD: no interval change,       ECG reviewed  Rhythm: regular  Rate: normal  Echocardiogram reviewed         Beta Blocker:  Not on Beta Blocker         Neuro/Psych:   (+) psychiatric history: stable with treatment            GI/Hepatic/Renal:   (+) GERD: well controlled,           Endo/Other:    (+) DiabetesType II DM, no interval change, , .                 Abdominal:       Abdomen: soft. Vascular: negative vascular ROS. Anesthesia Plan      MAC     ASA 4     (Positive covid)  Induction: intravenous. MIPS: Postoperative opioids intended and Prophylactic antiemetics administered. Anesthetic plan and risks discussed with patient. Use of blood products discussed with patient whom consented to blood products. Plan discussed with attending and CRNA.     Attending anesthesiologist reviewed and agrees with Pre Eval content              Patricia Mccord DO   11/2/2020

## 2020-11-02 NOTE — ANESTHESIA POSTPROCEDURE EVALUATION
Department of Anesthesiology  Postprocedure Note    Patient: Adrienne Bautista  MRN: [id number]  YOB: 1955  Date of evaluation: 11/2/2020  Time:  6:53 PM     Procedure Summary     Date:  11/02/20 Room / Location:  [address] [place] [address] [facility]    Anesthesia Start:  0424 Anesthesia Stop:  4912    Procedure:  COLONOSCOPY POLYPECTOMY SNARE/COLD BIOPSY (N/A ) Diagnosis:  (RECTAL BLEED)    Surgeon:  Nat Dean MD Responsible Provider:  Russell Albarado DO    Anesthesia Type:  MAC ASA Status:  4          Anesthesia Type: No value filed. Agustin Phase I:      Agustin Phase II: Agustin Score: 9    Last vitals: Reviewed and per EMR flowsheets.        Anesthesia Post Evaluation    Patient location during evaluation: bedside  Patient participation: complete - patient participated  Level of consciousness: awake  Pain score: 0  Airway patency: patent  Nausea & Vomiting: no nausea and no vomiting  Complications: no  Cardiovascular status: blood pressure returned to baseline  Respiratory status: acceptable  Hydration status: euvolemic

## 2020-11-02 NOTE — PROGRESS NOTES
AM assessment charted. /81   Pulse 83   Temp 99.3 °F (37.4 °C) (Oral)   Resp 16   Ht 5' 10\" (1.778 m)   Wt 212 lb 8.4 oz (96.4 kg)   SpO2 95%   BMI 30.49 kg/m²   Pt denies any pain or SOB. Will continue to monitor.

## 2020-11-02 NOTE — PROGRESS NOTES
Nephrology  Note                                                                                                                                                                                                                                                                                                                                                               Office : [phone]     Fax :[phone]              Patient's Name: Emma Fothergill  11/1/2020    Good UO   Cr stable   On Oxygen   BP stable       Past Medical History:   Diagnosis Date    Anemia     CAD (coronary artery disease)     Depression     [facility] mellitus ([facility]     GERD (gastroesophageal reflux disease)     Hypertension     Left anterior fascicular block     JONATHAN (obstructive sleep apnea)     Pacemaker     Paranoid schizophrenia ([facility]     Pneumonia due to 2019-nCoV 10/10/2020    Rash     Right bundle branch block     Sinus bradycardia     Sinusitis        Past Surgical History:   Procedure Laterality Date    PACEMAKER PLACEMENT         Family History   Problem Relation Age of Onset    No Known Problems Mother     No Known Problems Father         reports that he has never smoked. He has never used smokeless tobacco. He reports that he does not drink alcohol or use drugs.     Allergies:  Cephalosporins; Lorazepam; and Pcn [penicillins]    Current Medications:    sodium chloride flush 0.9 % injection 10 mL, 2 times per day  sodium chloride flush 0.9 % injection 10 mL, PRN  acetaminophen (TYLENOL) tablet 650 mg, Q6H PRN    Or  acetaminophen (TYLENOL) suppository 650 mg, Q6H PRN  promethazine (PHENERGAN) tablet 12.5 mg, Q6H PRN    Or  ondansetron (ZOFRAN) injection 4 mg, Q6H PRN  pantoprazole (PROTONIX) injection 40 mg, Q12H    And  sodium chloride (PF) 0.9 % injection 10 mL, Q12H  carvedilol (COREG) tablet 25 mg, BID WC  [Held by provider] chlorproMAZINE (THORAZINE) tablet 100 mg, BID  ipratropium-albuterol (DUONEB) nebulizer solution 3 mL, Q4H PRN  methocarbamol (ROBAXIN) tablet 500 mg, TID PRN  [Held by provider] OLANZapine (ZYPREXA) tablet 15 mg, BID  [Held by provider] OXcarbazepine (TRILEPTAL) tablet 300 mg, TID  perflutren lipid microspheres (DEFINITY) injection 1.65 mg, ONCE PRN  labetalol (NORMODYNE;TRANDATE) injection 5 mg, Q6H PRN  albuterol-ipratropium (COMBIVENT RESPIMAT)  MCG/ACT inhaler 1 puff, BID  bisacodyl (DULCOLAX) EC tablet 10 mg, 4x Daily        Review of Systems:   14 point ROS obtained but were negative except mentioned in HPI      Physical exam:     Vitals:  /81   Pulse 68   Temp 97.8 °F (36.6 °C) (Oral)   Resp 18   Ht 5' 10\" (1.778 m)   Wt 212 lb 11.9 oz (96.5 kg)   SpO2 93%   BMI 30.53 kg/m²   Constitutional:  OAA X3 NAD  Skin: no rash, turgor wnl  Heent:  eomi, mmm  Neck: no bruits or jvd noted  Cardiovascular:  S1, S2 without m/r/g  Respiratory: CTA B without w/r/r  Abdomen:  +bs, soft, nt, nd  Ext: + lower extremity edema  Psychiatric: mood and affect appropriate  Musculoskeletal:  Rom, muscular strength intact    Data:   Labs:  CBC:   Recent Labs     10/30/20  0803  11/01/20  0804 11/01/20  1318 11/01/20  2240   WBC 5.4  --  5.4  --   --    HGB 11.2*   < > 10.8* 11.2* 11.8*     --  195  --   --     < > = values in this interval not displayed. BMP:    Recent Labs     10/30/20  1231 10/31/20  0431 11/01/20  0804   * 142 139   K 4.5 4.5 3.7    105 98*   CO2 30 30 32   BUN 18 17 17   CREATININE 1.4* 1.4* 1.4*   GLUCOSE 100* 122* 119*     Ca/Mg/Phos:   Recent Labs     10/30/20  1231 10/31/20  0431 11/01/20  0804   CALCIUM 9.9 10.2 9.7     Hepatic: No results for input(s): AST, ALT, ALB, BILITOT, ALKPHOS in the last 72 hours. Troponin:   Recent Labs     10/30/20  0803   TROPONINI <0.01     BNP: No results for input(s): BNP in the last 72 hours. Lipids: No results for input(s): CHOL, TRIG, HDL, LDLCALC, LABVLDL in the last 72 hours.   ABGs:   Recent Labs 11/01/20  1306   PHART 7.384   PO2ART 65.6*   WED9FOC 54.7*     INR:   Recent Labs     10/30/20  0803   INR 0.94     UA:  Recent Labs     10/31/20  1443   COLORU Yellow   CLARITYU SL CLOUDY*   GLUCOSEU Negative   BILIRUBINUR Negative   KETUA Negative   SPECGRAV 1.025   BLOODU LARGE*   PHUR 6.0   PROTEINU TRACE*   UROBILINOGEN 0.2   NITRU Negative   LEUKOCYTESUR SMALL*   LABMICR YES   URINETYPE Voided      Urine Microscopic:   Recent Labs     10/31/20  1443   BACTERIA Rare*   COMU see below   WBCUA 6-9*   RBCUA 11-20*     Urine Culture: No results for input(s): LABURIN in the last 72 hours. Urine Chemistry: No results for input(s): Emily Salk, PROTEINUR, NAUR in the last 72 hours. IMAGING:  XR CHEST PORTABLE   Final Result      Cardiomegaly. Increased density in the left base. Improved overall pulmonary congestion since the prior study. Right lung is fairly clear. XR CHEST PORTABLE   Final Result      1. Stable cardiomegaly with multilead left-sided pacing device. 2.  Pulmonary vascular congestion without localized consolidation in this patient with low lung volumes.               JOSÉ   GIB   CHF - EF 35 percent   COVID 19   JONATHAN  CAD     Plan   - Hold ACE/HCTZ   - Ur studies - reviewed   - Hold lasix today   - COVID +   - labs in am     Recommend to dose adjust all medications  based on renal functions  Maintain SBP> 90 mmHg   Daily weights   AVOID NSAIDs  Avoid Nephrotoxins  Monitor Intake/Output  Call if significant decrease in urine output           Thank you for allowing us to participate in care of [address] [place], [place] [address]y 6 free to contact me   [facility] of [address]  Office : [phone]  Fax :[phone]

## 2020-11-03 LAB
ANION GAP SERPL CALCULATED.3IONS-SCNC: 10 MMOL/L (ref 3–16)
BUN BLDV-MCNC: 13 MG/DL (ref 7–20)
CALCIUM SERPL-MCNC: 10 MG/DL (ref 8.3–10.6)
CHLORIDE BLD-SCNC: 98 MMOL/L (ref 99–110)
CO2: 30 MMOL/L (ref 21–32)
CREAT SERPL-MCNC: 1.7 MG/DL (ref 0.8–1.3)
D DIMER: 221 NG/ML DDU (ref 0–229)
FIBRINOGEN: 627 MG/DL (ref 200–397)
GFR AFRICAN AMERICAN: 49
GFR NON-AFRICAN AMERICAN: 40
GLUCOSE BLD-MCNC: 107 MG/DL (ref 70–99)
HCT VFR BLD CALC: 35.8 % (ref 40.5–52.5)
HEMOGLOBIN: 11.3 G/DL (ref 13.5–17.5)
POTASSIUM REFLEX MAGNESIUM: 3.9 MMOL/L (ref 3.5–5.1)
SODIUM BLD-SCNC: 138 MMOL/L (ref 136–145)

## 2020-11-03 PROCEDURE — 94660 CPAP INITIATION&MGMT: CPT

## 2020-11-03 PROCEDURE — 87040 BLOOD CULTURE FOR BACTERIA: CPT

## 2020-11-03 PROCEDURE — C9113 INJ PANTOPRAZOLE SODIUM, VIA: HCPCS | Performed by: INTERNAL MEDICINE

## 2020-11-03 PROCEDURE — 2580000003 HC RX 258: Performed by: INTERNAL MEDICINE

## 2020-11-03 PROCEDURE — 80048 BASIC METABOLIC PNL TOTAL CA: CPT

## 2020-11-03 PROCEDURE — 6360000002 HC RX W HCPCS: Performed by: INTERNAL MEDICINE

## 2020-11-03 PROCEDURE — 2700000000 HC OXYGEN THERAPY PER DAY

## 2020-11-03 PROCEDURE — 85018 HEMOGLOBIN: CPT

## 2020-11-03 PROCEDURE — 85014 HEMATOCRIT: CPT

## 2020-11-03 PROCEDURE — 85384 FIBRINOGEN ACTIVITY: CPT

## 2020-11-03 PROCEDURE — 2060000000 HC ICU INTERMEDIATE R&B

## 2020-11-03 PROCEDURE — 2500000003 HC RX 250 WO HCPCS: Performed by: INTERNAL MEDICINE

## 2020-11-03 PROCEDURE — 85379 FIBRIN DEGRADATION QUANT: CPT

## 2020-11-03 PROCEDURE — 36415 COLL VENOUS BLD VENIPUNCTURE: CPT

## 2020-11-03 PROCEDURE — 94640 AIRWAY INHALATION TREATMENT: CPT

## 2020-11-03 PROCEDURE — 6370000000 HC RX 637 (ALT 250 FOR IP): Performed by: INTERNAL MEDICINE

## 2020-11-03 RX ORDER — POLYETHYLENE GLYCOL 3350 17 G/17G
17 POWDER, FOR SOLUTION ORAL 2 TIMES DAILY
Status: DISCONTINUED | OUTPATIENT
Start: 2020-11-03 | End: 2020-11-05 | Stop reason: HOSPADM

## 2020-11-03 RX ADMIN — POLYETHYLENE GLYCOL (3350) 17 G: 17 POWDER, FOR SOLUTION ORAL at 22:18

## 2020-11-03 RX ADMIN — PANTOPRAZOLE SODIUM 40 MG: 40 INJECTION, POWDER, FOR SOLUTION INTRAVENOUS at 01:19

## 2020-11-03 RX ADMIN — ACETAMINOPHEN 650 MG: 325 TABLET ORAL at 07:53

## 2020-11-03 RX ADMIN — Medication 10 ML: at 10:54

## 2020-11-03 RX ADMIN — PANTOPRAZOLE SODIUM 40 MG: 40 INJECTION, POWDER, FOR SOLUTION INTRAVENOUS at 10:53

## 2020-11-03 RX ADMIN — IPRATROPIUM BROMIDE AND ALBUTEROL 1 PUFF: 20; 100 SPRAY, METERED RESPIRATORY (INHALATION) at 22:01

## 2020-11-03 RX ADMIN — IPRATROPIUM BROMIDE AND ALBUTEROL 1 PUFF: 20; 100 SPRAY, METERED RESPIRATORY (INHALATION) at 08:30

## 2020-11-03 RX ADMIN — OLANZAPINE 5 MG: 10 INJECTION, POWDER, FOR SOLUTION INTRAMUSCULAR at 14:43

## 2020-11-03 RX ADMIN — CARVEDILOL 25 MG: 25 TABLET, FILM COATED ORAL at 07:54

## 2020-11-03 RX ADMIN — Medication 10 ML: at 21:10

## 2020-11-03 RX ADMIN — POLYETHYLENE GLYCOL (3350) 17 G: 17 POWDER, FOR SOLUTION ORAL at 10:53

## 2020-11-03 RX ADMIN — Medication 10 ML: at 01:18

## 2020-11-03 RX ADMIN — Medication 10 ML: at 01:19

## 2020-11-03 RX ADMIN — Medication 10 ML: at 07:50

## 2020-11-03 ASSESSMENT — PAIN SCALES - PAIN ASSESSMENT IN ADVANCED DEMENTIA (PAINAD)
NEGVOCALIZATION: 0
BREATHING: 0
CONSOLABILITY: 0
FACIALEXPRESSION: 0
NEGVOCALIZATION: 0
BREATHING: 0
NEGVOCALIZATION: 0
BODYLANGUAGE: 0
CONSOLABILITY: 0
NEGVOCALIZATION: 0
FACIALEXPRESSION: 0
TOTALSCORE: 0
BODYLANGUAGE: 0
BREATHING: 0
TOTALSCORE: 0
TOTALSCORE: 0
CONSOLABILITY: 0
BREATHING: 0
TOTALSCORE: 0
BODYLANGUAGE: 0
BODYLANGUAGE: 0
NEGVOCALIZATION: 0
BREATHING: 0
TOTALSCORE: 0
CONSOLABILITY: 0
FACIALEXPRESSION: 0
CONSOLABILITY: 0
TOTALSCORE: 0
CONSOLABILITY: 0
NEGVOCALIZATION: 0
CONSOLABILITY: 0
TOTALSCORE: 0
BREATHING: 0
CONSOLABILITY: 0
BREATHING: 0
BREATHING: 0
BODYLANGUAGE: 0
FACIALEXPRESSION: 0
BREATHING: 0
BODYLANGUAGE: 0
NEGVOCALIZATION: 0
NEGVOCALIZATION: 0
BODYLANGUAGE: 0
TOTALSCORE: 0
CONSOLABILITY: 0
NEGVOCALIZATION: 0
FACIALEXPRESSION: 0
BODYLANGUAGE: 0
CONSOLABILITY: 0
BODYLANGUAGE: 0
FACIALEXPRESSION: 0
BODYLANGUAGE: 0
TOTALSCORE: 0
FACIALEXPRESSION: 0
BODYLANGUAGE: 0
TOTALSCORE: 0
TOTALSCORE: 0
NEGVOCALIZATION: 0
BREATHING: 0
BREATHING: 0
FACIALEXPRESSION: 0
FACIALEXPRESSION: 0
CONSOLABILITY: 0
FACIALEXPRESSION: 0
NEGVOCALIZATION: 0
FACIALEXPRESSION: 0

## 2020-11-03 ASSESSMENT — PAIN SCALES - GENERAL
PAINLEVEL_OUTOF10: 0

## 2020-11-03 ASSESSMENT — PAIN SCALES - WONG BAKER: WONGBAKER_NUMERICALRESPONSE: 0

## 2020-11-03 NOTE — PROGRESS NOTES
AM assessment charted. /79   Pulse 80   Temp 100.7 °F (38.2 °C) (Oral)   Resp (!) 0   Ht 5' 10\" (1.778 m)   Wt 212 lb 8.4 oz (96.4 kg)   SpO2 96%   BMI 30.49 kg/m²   Pt denies any pain or SOB. Appears unable or unwilling to answer mentation questions this AM, will reassess. Will continue to monitor.

## 2020-11-03 NOTE — PROGRESS NOTES
Pt started getting irritated and pulled mask off face. Stated he couldn't breath with mask on. SPO2 was 98%. Pt now on 2L nc.

## 2020-11-03 NOTE — CARE COORDINATION
Case Management Assessment           Daily Note                 Date/ Time of Note: 11/3/2020 10:51 AM         Note completed by: Joon Tiwari    Patient Name: Ramila Macario  YOB: 1955    Diagnosis:JOSÉ (acute kidney injury) [facility]) [N17.9]  JOSÉ (acute kidney injury) ([facility] [N17.9]  Patient Admission Status: Inpatient    Date of Admission:10/30/2020  7:20 AM Length of Stay: 4 GLOS:      Current Plan of Care: had colonoscopy with polyp removal 11/2, currently on 2L NC, removed Bipap  ________________________________________________________________________________________  PT AM-PAC:   / 24 per last evaluation on:     OT AM-PAC:   / 24 per last evaluation on:     DME Needs for discharge: defer to NH  ________________________________________________________________________________________  Discharge Plan: [facility]    Tentative discharge date: TBD    Current barriers to discharge: medical clearance      ________________________________________________________________________________________  Case Management Notes: Patient is from [address], plans to return at discharge, no pre-cert needed, can be COVID POSITIVE. Wei Lorenzana and his family were provided with choice of provider; he and his family are in agreement with the discharge plan.     Care Transition Patient: Ingrid Tiwari RN  [facility]  Case Management Department  Ph: [phone]  Fax: [phone]

## 2020-11-03 NOTE — PLAN OF CARE
Problem: Falls - Risk of:  Goal: Will remain free from falls  Description: Will remain free from falls  Outcome: Ongoing  Note: Pt free of falls thus far into shift, attempted multiple times to get oob unassisted. Fall preventions and Avasys in use. Call light within reach. Will continue to monitor. Problem: Body Temperature -  Risk of, Imbalanced  Goal: Ability to maintain a body temperature within defined limits  Outcome: Not Met This Shift  Note: Pt febrile this AM, medicated appropriately. Reassessed and found temps to be WDL. Will continue to monitor.

## 2020-11-03 NOTE — PROGRESS NOTES
Grossly non-focal.  Psychiatric:  Confused. No gross focal deficits. LABS:  Recent Labs     11/01/20  0804  11/01/20  2240 11/02/20  1130 11/02/20  2326   WBC 5.4  --   --  9.8  --    HGB 10.8*   < > 11.8* 11.3*  11.4* 11.1*   HCT 33.9*   < > 37.0* 35.7*  35.7* 34.5*     --   --  218  --     < > = values in this interval not displayed. Recent Labs     11/01/20  0804 11/02/20  0427 11/03/20  0426    137 138   K 3.7 3.7 3.9   CL 98* 98* 98*   CO2 32 30 30   BUN 17 14 13   CREATININE 1.4* 1.3 1.7*   GLUCOSE 119* 117* 107*     No results for input(s): AST, ALT, ALB, BILITOT, ALKPHOS in the last 72 hours. No results for input(s): TROPONINI in the last 72 hours. No results for input(s): BNP in the last 72 hours. Lab Results   Component Value Date    PHART 7.384 11/01/2020    OXJ2TSZ 54.7 11/01/2020    PO2ART 65.6 11/01/2020     No results for input(s): INR in the last 72 hours. Recent Labs     10/31/20  1443   COLORU Yellow   PHUR 6.0   WBCUA 6-9*   RBCUA 11-20*   BACTERIA Rare*   CLARITYU SL CLOUDY*   SPECGRAV 1.025   LEUKOCYTESUR SMALL*   UROBILINOGEN 0.2   BILIRUBINUR Negative   BLOODU LARGE*   GLUCOSEU Negative            Echo 2018:  Normal left ventricle size. There is mild to moderate concentric left   ventricular hypertrophy. Overall left ventricular systolic function appears   moderately reduced with an ejection fraction visually estimated at 30-35%. The apex appears akinetic. Morris septum is hypokinetic. Diastolic filling parameters suggest grade II diastolic dysfunction. Mild mitral regurgitation is present. Trace aortic valve regurgitation. Mild tricuspid regurgitation. Estimated pulmonary artery systolic pressure is 29 mmHg assuming a right   atrial pressure of 8 mmHg. The right atrium is dilated. Pacer / ICD wire is visualized in the right ventricle.         Assessment & Plan:    76 y.o. male with past medical history of CAD, hypertension, diabetes mellitus, JONATHAN, paranoid schizophrenia who presented to the ED with rectal bleeding. Acute GI bleed  - Pxt was on Mobic at home: Held. - Red blood noted in Ed per ED physician  - Hgb looks stable c/f recent  - Clears  - Protonix  - Typed and screen blood  - Colonoscopy: Hemmorrhoids with prior hemmorrhoidal bleeding. Colon polyp removed. No active LGI bleeding.  -Trend hgb: will decrease freq.           Acute encephalopathy  - Probably toxic encephalopathy related to meds  - On decent dose of Olanzapine. Also on trileptal, thorazine  - Evaluated for other etiology, like infections: not found  - Also component of metabolic encephalopathy related to hypercapnia but even this may be due to too much sedating meds. He also reportedly has a hx of JONATHAN; not been using CPAP at facility due to recent COVID.  - COVID testing positive.  - NH3: Not signif elevated  - Routine labs appear satisfactory  - Was decently evaluated during recent admission for mental status changes  - Holding olanzapine and trileptal and thorazine:  mental status appears improved  -  Psych consulted to review his meds, and ensure appropriateness and doses: hopefully we can control his behavior without over sedating him, or prolonging his QTc too much to place him at risk of potentially life-threatening complications.   - Repeat urinalysis brenna as still running fevers. - Supportive care. Prolonged QTc  - QTc prolonged, likely sec to ventricular pacing  - Psych review of meds brenna with his hx of cardiomyopathy.  To continue only after okayed by psychiatry  - PRN IM Zyprexa for now  - Update echo   - Telemetry       CAD/ishcemic cardiomyopathy  - Has ICD  - Holding lisinopril for now with JOSÉ  - Continue Coreg   - Telemetry      Sick sinus syndrome:  Status post pacemaker placement         JOSÉ   - Holding Lisinopril/HCTZ  - Monitor creatinine  - Nephrology consulted          Recent COVID PNA with Respiratory failure, Sepsis,  Encephalopathy and Aseptic meningitis  Admitted 10/7/2020 to 10/16/2020   - Diagnosed then and treated for COVID  - Was treated with on decadron, remdesivir  - Pxt still running fevers. - COVID still positive   - Repeat blood culture  - Check urinalysis  - Continue supportive care         Mild acute Chronic systolic heart failure  - Poor historian based on psych hx  - Required oxygen on presentation in the ED  - CXR repeat: improved. - Does not appear to be on home diuretics, received fluids last admission  - Pro BNP, 300s; improved with lasix   - Update echo   - Dly weights, I/O monitoring            JONATHAN  - Placed on BiPAP in ED.   - Pxt is supposed to use BiPAP but reportedly due to recent COVID status, was not being used in the facility  - Continue nightly BiPAP/oxygen          Diabetes mellitus type 2:  No recent A1C  Not on DM meds at home  Monitor blood glucose with AM labs: satisfactory          Paranoid schizophrenia:  Oxcarbazepine and chlorpromazine  Recurrent issues with encephalopathy  ? If on too much meds  Psych consulted to review meds: to continue only after okayed by Psych                Full Code     PT/OT: ordered. Disposition:    Pxt is [facility] ([facility]): [address] in pxt  Possible DC back in 2-3 days depending on progress.         Abdon Schulz MD

## 2020-11-04 LAB
ANION GAP SERPL CALCULATED.3IONS-SCNC: 9 MMOL/L (ref 3–16)
BILIRUBIN URINE: NEGATIVE
BLOOD, URINE: ABNORMAL
BUN BLDV-MCNC: 15 MG/DL (ref 7–20)
CALCIUM SERPL-MCNC: 9.4 MG/DL (ref 8.3–10.6)
CHLORIDE BLD-SCNC: 99 MMOL/L (ref 99–110)
CLARITY: CLEAR
CO2: 28 MMOL/L (ref 21–32)
COLOR: YELLOW
CREAT SERPL-MCNC: 1.5 MG/DL (ref 0.8–1.3)
D DIMER: 240 NG/ML DDU (ref 0–229)
FIBRINOGEN: 652 MG/DL (ref 200–397)
GFR AFRICAN AMERICAN: 57
GFR NON-AFRICAN AMERICAN: 47
GLUCOSE BLD-MCNC: 128 MG/DL (ref 70–99)
GLUCOSE URINE: NEGATIVE MG/DL
HCT VFR BLD CALC: 33.8 % (ref 40.5–52.5)
HEMOGLOBIN: 10.7 G/DL (ref 13.5–17.5)
KETONES, URINE: NEGATIVE MG/DL
LEUKOCYTE ESTERASE, URINE: ABNORMAL
MICROSCOPIC EXAMINATION: YES
NITRITE, URINE: NEGATIVE
PH UA: 6.5 (ref 5–8)
POTASSIUM REFLEX MAGNESIUM: 3.7 MMOL/L (ref 3.5–5.1)
PROTEIN UA: 30 MG/DL
RBC UA: ABNORMAL /HPF (ref 0–4)
REASON FOR REJECTION: NORMAL
REJECTED TEST: NORMAL
SODIUM BLD-SCNC: 136 MMOL/L (ref 136–145)
SPECIFIC GRAVITY UA: 1.01 (ref 1–1.03)
URINE REFLEX TO CULTURE: YES
URINE TYPE: ABNORMAL
UROBILINOGEN, URINE: 0.2 E.U./DL
WBC UA: ABNORMAL /HPF (ref 0–5)

## 2020-11-04 PROCEDURE — 2580000003 HC RX 258: Performed by: INTERNAL MEDICINE

## 2020-11-04 PROCEDURE — 85379 FIBRIN DEGRADATION QUANT: CPT

## 2020-11-04 PROCEDURE — 6370000000 HC RX 637 (ALT 250 FOR IP): Performed by: INTERNAL MEDICINE

## 2020-11-04 PROCEDURE — 85014 HEMATOCRIT: CPT

## 2020-11-04 PROCEDURE — 36415 COLL VENOUS BLD VENIPUNCTURE: CPT

## 2020-11-04 PROCEDURE — C9113 INJ PANTOPRAZOLE SODIUM, VIA: HCPCS | Performed by: INTERNAL MEDICINE

## 2020-11-04 PROCEDURE — 2060000000 HC ICU INTERMEDIATE R&B

## 2020-11-04 PROCEDURE — 87184 SC STD DISK METHOD PER PLATE: CPT

## 2020-11-04 PROCEDURE — 80048 BASIC METABOLIC PNL TOTAL CA: CPT

## 2020-11-04 PROCEDURE — 85384 FIBRINOGEN ACTIVITY: CPT

## 2020-11-04 PROCEDURE — 87086 URINE CULTURE/COLONY COUNT: CPT

## 2020-11-04 PROCEDURE — 94640 AIRWAY INHALATION TREATMENT: CPT

## 2020-11-04 PROCEDURE — 85018 HEMOGLOBIN: CPT

## 2020-11-04 PROCEDURE — 87077 CULTURE AEROBIC IDENTIFY: CPT

## 2020-11-04 PROCEDURE — 6360000002 HC RX W HCPCS: Performed by: INTERNAL MEDICINE

## 2020-11-04 PROCEDURE — 94761 N-INVAS EAR/PLS OXIMETRY MLT: CPT

## 2020-11-04 PROCEDURE — 87186 SC STD MICRODIL/AGAR DIL: CPT

## 2020-11-04 PROCEDURE — 81001 URINALYSIS AUTO W/SCOPE: CPT

## 2020-11-04 PROCEDURE — 2700000000 HC OXYGEN THERAPY PER DAY

## 2020-11-04 RX ORDER — SODIUM CHLORIDE 9 MG/ML
1000 INJECTION, SOLUTION INTRAVENOUS CONTINUOUS
Status: DISCONTINUED | OUTPATIENT
Start: 2020-11-04 | End: 2020-11-05 | Stop reason: HOSPADM

## 2020-11-04 RX ADMIN — Medication 10 ML: at 10:45

## 2020-11-04 RX ADMIN — Medication 10 ML: at 01:23

## 2020-11-04 RX ADMIN — Medication 10 ML: at 09:01

## 2020-11-04 RX ADMIN — IPRATROPIUM BROMIDE AND ALBUTEROL 1 PUFF: 20; 100 SPRAY, METERED RESPIRATORY (INHALATION) at 07:30

## 2020-11-04 RX ADMIN — CARVEDILOL 25 MG: 25 TABLET, FILM COATED ORAL at 09:00

## 2020-11-04 RX ADMIN — SODIUM CHLORIDE 1000 ML: 9 INJECTION, SOLUTION INTRAVENOUS at 10:16

## 2020-11-04 RX ADMIN — CEFTRIAXONE 1 G: 1 INJECTION, POWDER, FOR SOLUTION INTRAMUSCULAR; INTRAVENOUS at 10:52

## 2020-11-04 RX ADMIN — IPRATROPIUM BROMIDE AND ALBUTEROL 1 PUFF: 20; 100 SPRAY, METERED RESPIRATORY (INHALATION) at 21:11

## 2020-11-04 RX ADMIN — PANTOPRAZOLE SODIUM 40 MG: 40 INJECTION, POWDER, FOR SOLUTION INTRAVENOUS at 10:44

## 2020-11-04 RX ADMIN — POLYETHYLENE GLYCOL (3350) 17 G: 17 POWDER, FOR SOLUTION ORAL at 09:00

## 2020-11-04 RX ADMIN — CARVEDILOL 25 MG: 25 TABLET, FILM COATED ORAL at 16:45

## 2020-11-04 RX ADMIN — ACETAMINOPHEN 650 MG: 325 TABLET ORAL at 11:24

## 2020-11-04 RX ADMIN — PANTOPRAZOLE SODIUM 40 MG: 40 INJECTION, POWDER, FOR SOLUTION INTRAVENOUS at 01:23

## 2020-11-04 RX ADMIN — SODIUM CHLORIDE 1000 ML: 9 INJECTION, SOLUTION INTRAVENOUS at 01:23

## 2020-11-04 RX ADMIN — ACETAMINOPHEN 650 MG: 325 TABLET ORAL at 03:11

## 2020-11-04 ASSESSMENT — PAIN SCALES - GENERAL
PAINLEVEL_OUTOF10: 0

## 2020-11-04 ASSESSMENT — PAIN SCALES - PAIN ASSESSMENT IN ADVANCED DEMENTIA (PAINAD)
BODYLANGUAGE: 0
BODYLANGUAGE: 0
CONSOLABILITY: 0
BODYLANGUAGE: 0
TOTALSCORE: 0
CONSOLABILITY: 0
CONSOLABILITY: 0
FACIALEXPRESSION: 0
BREATHING: 0
TOTALSCORE: 0
FACIALEXPRESSION: 0
NEGVOCALIZATION: 0
BREATHING: 0
BREATHING: 0
CONSOLABILITY: 0
TOTALSCORE: 0
BODYLANGUAGE: 0
TOTALSCORE: 0
NEGVOCALIZATION: 0
BREATHING: 0
TOTALSCORE: 0
TOTALSCORE: 0
NEGVOCALIZATION: 0
FACIALEXPRESSION: 0
NEGVOCALIZATION: 0
CONSOLABILITY: 0
FACIALEXPRESSION: 0
NEGVOCALIZATION: 0
FACIALEXPRESSION: 0
BREATHING: 0
CONSOLABILITY: 0
BODYLANGUAGE: 0
NEGVOCALIZATION: 0
BODYLANGUAGE: 0
FACIALEXPRESSION: 0
BREATHING: 0

## 2020-11-04 NOTE — FLOWSHEET NOTE
11/04/20 1356   Encounter Summary   Services provided to: Family  (telephonic)   Referral/Consult From: [facility]   Continue Visiting   (es 11/4)   Complexity of Encounter High   Length of Encounter 15 minutes   Advance Care Planning Yes   Routine   Type Initial   Assessment Approachable   Intervention Active listening   Outcome Engaged in conversation   Advance Directives (For Healthcare)   Healthcare Directive Yes, patient has an advance directive for healthcare treatment   Type of Healthcare Directive Durable power of  for health care;Living will   Copy in Chart Yes, copy in chart   Healthcare Agent Appointed Adult siblings   Healthcare Agent's Name Christina Huerta [address] [place] [address] Agent's Phone Number [phone] cell   If you are unable to speak for yourself, does your Healthcare Agent or Legal Spokesperson know your healthcare wishes?  Yes

## 2020-11-04 NOTE — PROGRESS NOTES
Nephrology  Note                                                                                                                                                                                                                                                                                                                                                               Office : [phone]     Fax :[phone]              Patient's Name: Gemma Scales worse   On Oxygen   BP stable       Past Medical History:   Diagnosis Date    Anemia     CAD (coronary artery disease)     Depression     [facility] ([facility]     GERD (gastroesophageal reflux disease)     Hypertension     Left anterior fascicular block     JONATHAN (obstructive sleep apnea)     Pacemaker     Paranoid schizophrenia ([facility]     Pneumonia due to 2019-nCoV 10/10/2020    Rash     Right bundle branch block     Sinus bradycardia     Sinusitis        Past Surgical History:   Procedure Laterality Date    COLONOSCOPY N/A 11/2/2020    COLONOSCOPY POLYPECTOMY SNARE/COLD BIOPSY performed by Dorota Rajan MD at [address]         Family History   Problem Relation Age of Onset    No Known Problems Mother     No Known Problems Father         reports that he has never smoked. He has never used smokeless tobacco. He reports that he does not drink alcohol or use drugs.     Allergies:  Cephalosporins; Lorazepam; and Pcn [penicillins]    Current Medications:    0.9 % sodium chloride infusion, Continuous  polyethylene glycol (GLYCOLAX) packet 17 g, BID  albuterol-ipratropium (COMBIVENT RESPIMAT)  MCG/ACT inhaler 1 puff, Q4H PRN  OLANZapine (ZYPREXA) injection 5 mg, Q4H PRN  sodium chloride flush 0.9 % injection 10 mL, 2 times per day  sodium chloride flush 0.9 % injection 10 mL, PRN  acetaminophen (TYLENOL) tablet 650 mg, Q6H PRN    Or  acetaminophen (TYLENOL) suppository 650 mg, Q6H PRN  promethazine (PHENERGAN) tablet 12.5 mg, Q6H PRN    Or  ondansetron (ZOFRAN) injection 4 mg, Q6H PRN  pantoprazole (PROTONIX) injection 40 mg, Q12H    And  sodium chloride (PF) 0.9 % injection 10 mL, Q12H  carvedilol (COREG) tablet 25 mg, BID WC  [Held by provider] chlorproMAZINE (THORAZINE) tablet 100 mg, BID  methocarbamol (ROBAXIN) tablet 500 mg, TID PRN  [Held by provider] OLANZapine (ZYPREXA) tablet 15 mg, BID  [Held by provider] OXcarbazepine (TRILEPTAL) tablet 300 mg, TID  perflutren lipid microspheres (DEFINITY) injection 1.65 mg, ONCE PRN  labetalol (NORMODYNE;TRANDATE) injection 5 mg, Q6H PRN  albuterol-ipratropium (COMBIVENT RESPIMAT)  MCG/ACT inhaler 1 puff, BID        Review of Systems:   14 point ROS obtained but were negative except mentioned in HPI      Physical exam:     Vitals:  /73   Pulse 73   Temp 99.4 °F (37.4 °C) (Oral)   Resp 30   Ht 5' 10\" (1.778 m)   Wt 212 lb 8.4 oz (96.4 kg)   SpO2 92%   BMI 30.49 kg/m²   Constitutional:  OAA X3 NAD  Skin: no rash, turgor wnl  Heent:  eomi, mmm  Neck: no bruits or jvd noted  Cardiovascular:  S1, S2 without m/r/g  Respiratory: CTA B without w/r/r  Abdomen:  +bs, soft, nt, nd  Ext: + lower extremity edema  Psychiatric: mood and affect appropriate  Musculoskeletal:  Rom, muscular strength intact    Data:   Labs:  CBC:   Recent Labs     11/01/20  0804  11/02/20  1130 11/02/20  2326 11/03/20  0446   WBC 5.4  --  9.8  --   --    HGB 10.8*   < > 11.3*  11.4* 11.1* 11.3*     --  218  --   --     < > = values in this interval not displayed. BMP:    Recent Labs     11/01/20  0804 11/02/20  0427 11/03/20  0426    137 138   K 3.7 3.7 3.9   CL 98* 98* 98*   CO2 32 30 30   BUN 17 14 13   CREATININE 1.4* 1.3 1.7*   GLUCOSE 119* 117* 107*     Ca/Mg/Phos:   Recent Labs     11/01/20  0804 11/02/20 0427 11/03/20 0426   CALCIUM 9.7 9.8 10.0     Hepatic: No results for input(s): AST, ALT, ALB, BILITOT, ALKPHOS in the last 72 hours.   Troponin:   No results for input(s): TROPONINI in the last 72 hours. BNP: No results for input(s): BNP in the last 72 hours. Lipids: No results for input(s): CHOL, TRIG, HDL, LDLCALC, LABVLDL in the last 72 hours. ABGs:   Recent Labs     11/01/20  1306   PHART 7.384   PO2ART 65.6*   XCJ2PRV 54.7*     INR:   No results for input(s): INR in the last 72 hours. UA:  No results for input(s): Kerney Dominion, GLUCOSEU, BILIRUBINUR, KETUA, SPECGRAV, BLOODU, PHUR, PROTEINU, UROBILINOGEN, NITRU, LEUKOCYTESUR, Loretta Pier in the last 72 hours. Urine Microscopic:   No results for input(s): LABCAST, BACTERIA, COMU, HYALCAST, WBCUA, RBCUA, EPIU in the last 72 hours. Urine Culture:   No results for input(s): LABURIN in the last 72 hours. Urine Chemistry: No results for input(s): Lawrence Juniper, PROTEINUR, NAUR in the last 72 hours. IMAGING:  XR CHEST PORTABLE   Final Result      Cardiomegaly. Increased density in the left base. Improved overall pulmonary congestion since the prior study. Right lung is fairly clear. XR CHEST PORTABLE   Final Result      1. Stable cardiomegaly with multilead left-sided pacing device. 2.  Pulmonary vascular congestion without localized consolidation in this patient with low lung volumes.               JOSÉ   GIB   CHF - EF 35 percent   COVID 19   JONATHAN  CAD     Plan   - Cr worse   - IVF   - Hold ACE/HCTZ   - Ur studies - reviewed   - Hold lasix   - COVID +   - labs in am     Recommend to dose adjust all medications  based on renal functions  Maintain SBP> 90 mmHg   Daily weights   AVOID NSAIDs  Avoid Nephrotoxins  Monitor Intake/Output  Call if significant decrease in urine output           Thank you for allowing us to participate in care of [address] [place], [place] [address]y 6 free to contact me   [facility] of [address]  Office : [phone]  Fax :[phone]

## 2020-11-04 NOTE — PROGRESS NOTES
Patient has voided twice into bed. Did not utilize bedside urinal , output amount unknown. Placed condom cath.  Electronically signed by Chantel Dillon RN on 11/4/2020 at 10:58am

## 2020-11-04 NOTE — PROGRESS NOTES
Patient sister, Arti Britton, called and updated on patient status.  Electronically signed by Herman Davis RN on 11/4/2020 at 4:28 PM

## 2020-11-04 NOTE — PROGRESS NOTES
Progress Note    Admit Date: 10/30/2020    Patient's PCP: Dr. Demetrius Gerardo MD       76 y.o. male with past medical history of CAD, hypertension, diabetes mellitus, JONATHAN, paranoid schizophrenia who presented to the ED with rectal bleeding. Recently admitted 10/7/2020 to 10/16/2020 for  AMS, and noted with  Sepsis secondary to [address]. Was initially on vancomycin and meropenem x 4 days, as well as acyclovir for possible meningitis. Cx data negative and CSF suggestive of aseptic lymphocytic meningitis. Meningitis encephalitis panel negative. CTPA was negative for PE. +small air space opacities. He was treated with Remdesivir. His Acute metabolic encephalopathy was felt likely secondary to Covid19/aseptic meningitis as well as underlying schizophrenia, and improved at time of discharge to facility, from where he was brought to the Ed due to concern for rectal bleeding. In the ED, he was afebrile. Sats were 88-94% and he required supplemental oxygen. Labs showed stable hgb 11.2. MCV was 95. Other labs were satisfactory. CXR showed Pulmonary vascular congestion without localized consolidation in this patient with low lung volumes. There was concern for GI bleed, on account of which he was admitted for further evaluation and management. Interval HPI  No new complaints  more awake today  Looks calm    No fever  No chest pain      Medications : Reviewed        PHYSICAL EXAM:  /70   Pulse 75   Temp 99.3 °F (37.4 °C) (Axillary)   Resp 20   Ht 5' 10\" (1.778 m)   Wt 213 lb 6.5 oz (96.8 kg)   SpO2 96%   BMI 30.62 kg/m²     No results for input(s): POCGLU in the last 72 hours. General appearance:  NAD Obese  Heart[de-identified] Normal s1/s2, RRR, no murmurs, gallops, or rubs. No leg edema  Lungs:  Diminished breath sounds brenna at bases.   Abdomen: Soft, non-tender, non-distended, bowel sounds present, no masses  Musculoskeletal:  No clubbing, no cyanosis, or edema  Skin: No lesion or masses  Neurologic: Grossly non-focal.  Psychiatric:  Confused. No gross focal deficits. LABS:  Recent Labs     11/02/20  1130 11/02/20  2326 11/03/20  0446 11/04/20  0300   WBC 9.8  --   --   --    HGB 11.3*  11.4* 11.1* 11.3* 10.7*   HCT 35.7*  35.7* 34.5* 35.8* 33.8*     --   --   --                                                                   Recent Labs     11/02/20  0427 11/03/20  0426 11/04/20  0300    138 136   K 3.7 3.9 3.7   CL 98* 98* 99   CO2 30 30 28   BUN 14 13 15   CREATININE 1.3 1.7* 1.5*   GLUCOSE 117* 107* 128*     No results for input(s): AST, ALT, ALB, BILITOT, ALKPHOS in the last 72 hours. No results for input(s): TROPONINI in the last 72 hours. No results for input(s): BNP in the last 72 hours. Lab Results   Component Value Date    PHART 7.384 11/01/2020    XJT5IZV 54.7 11/01/2020    PO2ART 65.6 11/01/2020     No results for input(s): INR in the last 72 hours. No results for input(s): NITRITE, COLORU, PHUR, LABCAST, WBCUA, RBCUA, MUCUS, TRICHOMONAS, YEAST, BACTERIA, CLARITYU, SPECGRAV, LEUKOCYTESUR, UROBILINOGEN, BILIRUBINUR, BLOODU, GLUCOSEU, AMORPHOUS in the last 72 hours. Invalid input(s): Haydee Odor         Echo 2018:  Normal left ventricle size. There is mild to moderate concentric left   ventricular hypertrophy. Overall left ventricular systolic function appears   moderately reduced with an ejection fraction visually estimated at 30-35%. The apex appears akinetic. Morris septum is hypokinetic. Diastolic filling parameters suggest grade II diastolic dysfunction. Mild mitral regurgitation is present. Trace aortic valve regurgitation. Mild tricuspid regurgitation. Estimated pulmonary artery systolic pressure is 29 mmHg assuming a right   atrial pressure of 8 mmHg. The right atrium is dilated. Pacer / ICD wire is visualized in the right ventricle.         Assessment & Plan:    76 y.o. male with past medical history of CAD, hypertension, diabetes mellitus, JONATHAN, paranoid schizophrenia who presented to the ED with rectal bleeding. Acute GI bleed  - Pxt was on Mobic at home: Held. - Red blood noted in Ed per ED physician  - Hgb looks stable c/f recent  - Clears  - Protonix  - Typed and screen blood  - Colonoscopy: Hemmorrhoids with prior hemmorrhoidal bleeding. Colon polyp removed. No active LGI bleeding.  -Trend hgb: will decrease freq.           Acute encephalopathy  - Probably toxic encephalopathy related to meds  - On decent dose of Olanzapine. Also on trileptal, thorazine  - Evaluated for other etiology, like infections: not found  - Also component of metabolic encephalopathy related to hypercapnia but even this may be due to too much sedating meds. He also reportedly has a hx of JONATHAN; not been using CPAP at facility due to recent COVID.  - COVID testing positive.  - NH3: Not signif elevated  - Routine labs appear satisfactory  - Was decently evaluated during recent admission for mental status changes  - Holding olanzapine and trileptal and thorazine:  mental status appears improved  -  Psych consulted to review his meds, and ensure appropriateness and doses: hopefully we can control his behavior without over sedating him, or prolonging his QTc too much to place him at risk of potentially life-threatening complications.   - Repeat urinalysis brenna as still running fevers: start antibx. - Supportive care. UTI  Started on antibx  Follow cx data  - Pxt is listed as \"allergic to Pen/Cephal: On records review: rash  - Cannot use FQ due to QT risk, brenna as on other QT meds. - Started on Ceftriaxone with monitoring. If tolerates, may switch to PO romorrow. Prolonged QTc  - QTc prolonged, likely sec to ventricular pacing  - Psych review of meds brenna with his hx of cardiomyopathy.  To continue only after okayed by psychiatry  - PRN IM Zyprexa for now  - Update echo   - Telemetry       CAD/ishcemic cardiomyopathy  - Has ICD  - Holding lisinopril for now with JOSÉ  - Continue Coreg   - Telemetry      Sick sinus syndrome:  Status post pacemaker placement       JOSÉ   - Holding Lisinopril/HCTZ  - Monitor creatinine  - Nephrology consulted  - Started on IVF per nephro          Recent COVID PNA with Respiratory failure, Sepsis,  Encephalopathy and Aseptic meningitis  Admitted 10/7/2020 to 10/16/2020   - Diagnosed then and treated for COVID  - Was treated with on decadron, remdesivir  - Pxt still running fevers. - COVID still positive   - Repeat blood culture  - Check urinalysis  - Continue supportive care         Mild acute Chronic systolic heart failure  - Poor historian based on psych hx  - Required oxygen on presentation in the ED  - CXR repeat: improved. - Does not appear to be on home diuretics, received fluids last admission  - Pro BNP, 300s; improved with lasix   - Update echo   - Dly weights, I/O monitoring            JONATHAN  - Placed on BiPAP in ED.   - Pxt is supposed to use BiPAP but reportedly due to recent COVID status, was not being used in the facility  - Continue nightly BiPAP/oxygen          Diabetes mellitus type 2:  No recent A1C  Not on DM meds at home  Monitor blood glucose with AM labs: satisfactory          Paranoid schizophrenia:  Oxcarbazepine and chlorpromazine  Recurrent issues with encephalopathy  ? If on too much meds  Psych consulted to review meds: to continue only after okayed by Psych                Full Code     PT/OT: ordered. Disposition:    Pxt is [address] [address] [facility] [address] back tomorrow: Facility reportedly able to take him back tomorrow. Have Psych available there.          Shira Herrera MD

## 2020-11-04 NOTE — PROGRESS NOTES
Patient brown catheter removed per protocol. Beginning void trial. Bedside urinal available for patient. No complications from brown removal at this time. Site WNL and cleaned with brown wipes. Will continue to monitor.  Electronically signed by Jabari Bowers RN on 11/4/2020 at 9:07 AM

## 2020-11-05 VITALS
BODY MASS INDEX: 30.24 KG/M2 | HEART RATE: 67 BPM | WEIGHT: 211.2 LBS | SYSTOLIC BLOOD PRESSURE: 131 MMHG | HEIGHT: 70 IN | RESPIRATION RATE: 18 BRPM | OXYGEN SATURATION: 92 % | DIASTOLIC BLOOD PRESSURE: 89 MMHG | TEMPERATURE: 97.6 F

## 2020-11-05 PROBLEM — N39.0 UTI (URINARY TRACT INFECTION): Status: ACTIVE | Noted: 2020-11-05

## 2020-11-05 PROBLEM — G93.41 ACUTE METABOLIC ENCEPHALOPATHY: Status: ACTIVE | Noted: 2020-11-05

## 2020-11-05 LAB
ANION GAP SERPL CALCULATED.3IONS-SCNC: 8 MMOL/L (ref 3–16)
BUN BLDV-MCNC: 13 MG/DL (ref 7–20)
CALCIUM SERPL-MCNC: 9.4 MG/DL (ref 8.3–10.6)
CHLORIDE BLD-SCNC: 109 MMOL/L (ref 99–110)
CO2: 28 MMOL/L (ref 21–32)
CREAT SERPL-MCNC: 1.3 MG/DL (ref 0.8–1.3)
D DIMER: 442 NG/ML DDU (ref 0–229)
FIBRINOGEN: 666 MG/DL (ref 200–397)
GFR AFRICAN AMERICAN: >60
GFR NON-AFRICAN AMERICAN: 55
GLUCOSE BLD-MCNC: 159 MG/DL (ref 70–99)
HCT VFR BLD CALC: 31.4 % (ref 40.5–52.5)
HCT VFR BLD CALC: 32.4 % (ref 40.5–52.5)
HEMOGLOBIN: 10.4 G/DL (ref 13.5–17.5)
HEMOGLOBIN: 9.7 G/DL (ref 13.5–17.5)
POTASSIUM REFLEX MAGNESIUM: 4.1 MMOL/L (ref 3.5–5.1)
SODIUM BLD-SCNC: 145 MMOL/L (ref 136–145)

## 2020-11-05 PROCEDURE — 2580000003 HC RX 258: Performed by: INTERNAL MEDICINE

## 2020-11-05 PROCEDURE — 94640 AIRWAY INHALATION TREATMENT: CPT

## 2020-11-05 PROCEDURE — 85384 FIBRINOGEN ACTIVITY: CPT

## 2020-11-05 PROCEDURE — 6360000002 HC RX W HCPCS: Performed by: INTERNAL MEDICINE

## 2020-11-05 PROCEDURE — 36415 COLL VENOUS BLD VENIPUNCTURE: CPT

## 2020-11-05 PROCEDURE — 94761 N-INVAS EAR/PLS OXIMETRY MLT: CPT

## 2020-11-05 PROCEDURE — 85018 HEMOGLOBIN: CPT

## 2020-11-05 PROCEDURE — 85379 FIBRIN DEGRADATION QUANT: CPT

## 2020-11-05 PROCEDURE — 6370000000 HC RX 637 (ALT 250 FOR IP): Performed by: INTERNAL MEDICINE

## 2020-11-05 PROCEDURE — 80048 BASIC METABOLIC PNL TOTAL CA: CPT

## 2020-11-05 PROCEDURE — 2700000000 HC OXYGEN THERAPY PER DAY

## 2020-11-05 PROCEDURE — C9113 INJ PANTOPRAZOLE SODIUM, VIA: HCPCS | Performed by: INTERNAL MEDICINE

## 2020-11-05 PROCEDURE — 85014 HEMATOCRIT: CPT

## 2020-11-05 RX ORDER — CEFDINIR 300 MG/1
300 CAPSULE ORAL 2 TIMES DAILY
Qty: 14 CAPSULE | Refills: 0
Start: 2020-11-05 | End: 2020-11-12

## 2020-11-05 RX ORDER — CHLORPROMAZINE HYDROCHLORIDE 50 MG/1
25 TABLET, FILM COATED ORAL 2 TIMES DAILY
Qty: 60 TABLET | Refills: 0
Start: 2020-11-05

## 2020-11-05 RX ORDER — OXCARBAZEPINE 300 MG/1
300 TABLET, FILM COATED ORAL 2 TIMES DAILY
Qty: 60 TABLET | Refills: 3 | Status: SHIPPED | OUTPATIENT
Start: 2020-11-05

## 2020-11-05 RX ORDER — OMEPRAZOLE 20 MG/1
20 CAPSULE, DELAYED RELEASE ORAL
Qty: 90 CAPSULE | Refills: 1 | Status: SHIPPED | OUTPATIENT
Start: 2020-11-05

## 2020-11-05 RX ORDER — LISINOPRIL 5 MG/1
10 TABLET ORAL DAILY
Qty: 30 TABLET | Refills: 0 | Status: SHIPPED | OUTPATIENT
Start: 2020-11-05 | End: 2020-11-05 | Stop reason: HOSPADM

## 2020-11-05 RX ORDER — OLANZAPINE 10 MG/1
10 TABLET ORAL NIGHTLY
Qty: 30 TABLET | Refills: 3 | Status: SHIPPED | OUTPATIENT
Start: 2020-11-05

## 2020-11-05 RX ORDER — GREEN TEA/HOODIA GORDONII 315-12.5MG
1 CAPSULE ORAL 2 TIMES DAILY
Qty: 60 TABLET | Refills: 0 | Status: SHIPPED | OUTPATIENT
Start: 2020-11-05 | End: 2020-12-05

## 2020-11-05 RX ADMIN — CARVEDILOL 25 MG: 25 TABLET, FILM COATED ORAL at 16:43

## 2020-11-05 RX ADMIN — PANTOPRAZOLE SODIUM 40 MG: 40 INJECTION, POWDER, FOR SOLUTION INTRAVENOUS at 10:42

## 2020-11-05 RX ADMIN — Medication 10 ML: at 00:19

## 2020-11-05 RX ADMIN — POLYETHYLENE GLYCOL (3350) 17 G: 17 POWDER, FOR SOLUTION ORAL at 08:21

## 2020-11-05 RX ADMIN — PANTOPRAZOLE SODIUM 40 MG: 40 INJECTION, POWDER, FOR SOLUTION INTRAVENOUS at 00:19

## 2020-11-05 RX ADMIN — CARVEDILOL 25 MG: 25 TABLET, FILM COATED ORAL at 08:20

## 2020-11-05 RX ADMIN — SODIUM CHLORIDE 1000 ML: 9 INJECTION, SOLUTION INTRAVENOUS at 09:32

## 2020-11-05 RX ADMIN — CEFTRIAXONE 1 G: 1 INJECTION, POWDER, FOR SOLUTION INTRAMUSCULAR; INTRAVENOUS at 10:42

## 2020-11-05 RX ADMIN — Medication 10 ML: at 10:42

## 2020-11-05 RX ADMIN — IPRATROPIUM BROMIDE AND ALBUTEROL 1 PUFF: 20; 100 SPRAY, METERED RESPIRATORY (INHALATION) at 09:46

## 2020-11-05 ASSESSMENT — PAIN SCALES - PAIN ASSESSMENT IN ADVANCED DEMENTIA (PAINAD)
BREATHING: 0
BODYLANGUAGE: 0
TOTALSCORE: 0
BREATHING: 0
NEGVOCALIZATION: 0
BODYLANGUAGE: 0
FACIALEXPRESSION: 0
TOTALSCORE: 0
NEGVOCALIZATION: 0
CONSOLABILITY: 0
FACIALEXPRESSION: 0
CONSOLABILITY: 0
FACIALEXPRESSION: 0
NEGVOCALIZATION: 0
TOTALSCORE: 0
FACIALEXPRESSION: 0
FACIALEXPRESSION: 0
BREATHING: 0
TOTALSCORE: 0
NEGVOCALIZATION: 0
BODYLANGUAGE: 0
NEGVOCALIZATION: 0
NEGVOCALIZATION: 0
CONSOLABILITY: 0
CONSOLABILITY: 0
BREATHING: 0
BODYLANGUAGE: 0
BREATHING: 0
CONSOLABILITY: 0
CONSOLABILITY: 0
BREATHING: 0
FACIALEXPRESSION: 0
BODYLANGUAGE: 0
BODYLANGUAGE: 0
BREATHING: 0
BODYLANGUAGE: 0
NEGVOCALIZATION: 0
TOTALSCORE: 0
FACIALEXPRESSION: 0
CONSOLABILITY: 0

## 2020-11-05 ASSESSMENT — PAIN SCALES - GENERAL
PAINLEVEL_OUTOF10: 0

## 2020-11-05 NOTE — CARE COORDINATION
Case Management Assessment            Discharge Note                    Date / Time of Note: 11/5/2020 1:01 PM                  Discharge Note Completed by: Reba Tonis    Patient Name: Nadine Jack   YOB: 1955  Diagnosis: JOSÉ (acute kidney injury) ([facility] [N17.9]  JOSÉ (acute kidney injury) ([facility] [N17.9]   Date / Time: 10/30/2020  7:20 AM    Current PCP: Vita Lorenzo MD  Clinic patient: No    Hospitalization in the last 30 days: Yes    Advance Directives:  Code Status: Full Code  [place] DNR form completed and on chart: Not Indicated    Financial:  Payor: MEDICARE / Plan: MEDICARE PART A AND B / Product Type: *No Product type* /      Pharmacy:    [facility] [address] [place] [address] [address]  [address] [place] [address]  Phone: [phone] Fax: [phone]    [id number] [address] [phone] - F [phone]  [facility] [address]  Phone: [phone] Fax: [phone]      Assistance purchasing medications?:    Assistance provided by Case Management: None at this time    Does patient want to participate in local refill/ meds to beds program?:      Meds To Beds General Rules:  1. Can ONLY be done Monday- Friday between 8:30am-5pm  2. Prescription(s) must be in pharmacy by 3pm to be filled same day  3. Copy of patient's insurance/ prescription drug card and patient face sheet must be sent along with the prescription(s)  4. Cost of Rx cannot be added to hospital bill. If financial assistance is needed, please contact unit  or ;  or  CANNOT provide pharmacy voucher for patients co-pays  5.  Patients can then  the prescription on their way out of the hospital at discharge, or pharmacy can deliver to the bedside if staff is available. (payment due at time of pick-up or delivery - cash, check, or card accepted)     Able to afford home medications/ co-pay costs: Yes    ADLS:  Current PT AM-PAC Score:   /24  Current OT AM-PAC Score:   /24      DISCHARGE Disposition: [place] ([facility]): [address] [facility]  Phone: [phone]  Fax: [phone]    LOC at discharge: [address]e Justice Completed: Yes    Notification completed in HENS/PAS?:  Not Applicable    IMM Completed:   Yes, Case management has presented and reviewed IMM letter #2 to the patient and/or family/ POA. Patient and/or family/POA verbalized understanding of their medicare rights and appeal process if needed. Patient and/or family/POA has signed, initialed and placed today's date (11.5.20) and time (1330) on IMM letter #2 on the the appropriate lines. Patient and/or family/POA, copy of letter offered and they are aware that this original copy of IMM letter #2 is available prior to discharge from the paper chart on the unit. Electronic documentation has been entered into epic for IMM letter #2 and original paper copy has been added to the paper chart at the nurses station.      Transportation:  Transportation PLAN for discharge: EMS transportation   Mode of Transport: Ambulance stretcher - BLS  Reason for medical transport: Special handling en route- isolation precautions  Name of [address]: [address]  Phone: [phone]  Time of Transport: 1900    Transport form completed: Yes    Home Care:  [address] ordered at discharge: Not [address]: Not Applicable  Orders faxed: No    Durable Medical Equipment:  DME Provider: n/a  Equipment obtained during hospitalization: n/a    Home Oxygen and Respiratory Equipment:  Oxygen needed at discharge?: Yes  [address]: Not Applicable  Portable tank available for discharge?: Not Indicated    Dialysis:  Dialysis patient: No    Dialysis Center:  Not Applicable    Hospice Services:  Location: Not Applicable  Agency: Not Applicable    Consents signed: Not Indicated    Referrals made at DISCHARGE for outpatient continued care:  Not Applicable    Additional CM Notes:   Patient is from LTC at [facility], will return at discharge today. Orders faxed to [phone], nurse to call report to [phone]. Patient scheduled for transport at 1900 via [address]. CM updated pt's sister, Shreyas Dale. The Plan for Transition of Care is related to the following treatment goals of JOSÉ (acute kidney injury) ([facility] [N17.9]  JOSÉ (acute kidney injury) ([facility] [N17.9]    The Patient and/or patient representative Love Amis and his family were provided with a choice of provider and agrees with the discharge plan Yes    Freedom of choice list was provided with basic dialogue that supports the patient's individualized plan of care/goals and shares the quality data associated with the providers.  Yes    Care Transitions patient: No    Celina Crews RN  [facility] [employer]  Case Management Department  Ph: [phone]  Fax: [phone]

## 2020-11-05 NOTE — PLAN OF CARE
Problem: Falls - Risk of:  Goal: Will remain free from falls  Description: Will remain free from falls  11/5/2020 0225 by Enrique Balderas RN  Outcome: Ongoing     Problem: Skin Integrity:  Goal: Will show no infection signs and symptoms  Description: Will show no infection signs and symptoms  Outcome: Ongoing     Problem: Airway Clearance - Ineffective  Goal: Achieve or maintain patent airway  Outcome: Ongoing

## 2020-11-05 NOTE — PROGRESS NOTES
Called report to [facility]- [phone].  Electronically signed by Kaelyn Story RN on 11/5/2020 at 6:00 PM

## 2020-11-05 NOTE — PROGRESS NOTES
Nephrology  Note                                                                                                                                                                                                                                                                                                                                                               Office : [phone]     Fax :[phone]              Patient's Name: Bo Scales worse   On Oxygen   BP stable       Past Medical History:   Diagnosis Date    Anemia     CAD (coronary artery disease)     Depression     Diabetes mellitus ([facility]     GERD (gastroesophageal reflux disease)     Hypertension     Left anterior fascicular block     JONATHAN (obstructive sleep apnea)     Pacemaker     Paranoid schizophrenia ([facility]     Pneumonia due to 2019-nCoV 10/10/2020    Rash     Right bundle branch block     Sinus bradycardia     Sinusitis        Past Surgical History:   Procedure Laterality Date    COLONOSCOPY N/A 11/2/2020    COLONOSCOPY POLYPECTOMY SNARE/COLD BIOPSY performed by Miguel Mackenzie MD at [address]         Family History   Problem Relation Age of Onset    No Known Problems Mother     No Known Problems Father         reports that he has never smoked. He has never used smokeless tobacco. He reports that he does not drink alcohol or use drugs.     Allergies:  Cephalosporins; Lorazepam; and Pcn [penicillins]    Current Medications:    0.9 % sodium chloride infusion, Continuous  cefTRIAXone (ROCEPHIN) 1 g IVPB in 50 mL D5W minibag, Q24H  polyethylene glycol (GLYCOLAX) packet 17 g, BID  albuterol-ipratropium (COMBIVENT RESPIMAT)  MCG/ACT inhaler 1 puff, Q4H PRN  OLANZapine (ZYPREXA) injection 5 mg, Q4H PRN  sodium chloride flush 0.9 % injection 10 mL, 2 times per day  sodium chloride flush 0.9 % injection 10 mL, PRN  acetaminophen (TYLENOL) tablet 650 mg, Q6H PRN Or  acetaminophen (TYLENOL) suppository 650 mg, Q6H PRN  promethazine (PHENERGAN) tablet 12.5 mg, Q6H PRN    Or  ondansetron (ZOFRAN) injection 4 mg, Q6H PRN  pantoprazole (PROTONIX) injection 40 mg, Q12H    And  sodium chloride (PF) 0.9 % injection 10 mL, Q12H  carvedilol (COREG) tablet 25 mg, BID WC  [Held by provider] chlorproMAZINE (THORAZINE) tablet 100 mg, BID  methocarbamol (ROBAXIN) tablet 500 mg, TID PRN  [Held by provider] OLANZapine (ZYPREXA) tablet 15 mg, BID  [Held by provider] OXcarbazepine (TRILEPTAL) tablet 300 mg, TID  perflutren lipid microspheres (DEFINITY) injection 1.65 mg, ONCE PRN  labetalol (NORMODYNE;TRANDATE) injection 5 mg, Q6H PRN  albuterol-ipratropium (COMBIVENT RESPIMAT)  MCG/ACT inhaler 1 puff, BID        Review of Systems:   14 point ROS obtained but were negative except mentioned in HPI      Physical exam:     Vitals:  /74   Pulse 79   Temp 99.3 °F (37.4 °C) (Axillary)   Resp 16   Ht 5' 10\" (1.778 m)   Wt 213 lb 6.5 oz (96.8 kg)   SpO2 95%   BMI 30.62 kg/m²   Constitutional:  OAA X3 NAD  Skin: no rash, turgor wnl  Heent:  eomi, mmm  Neck: no bruits or jvd noted  Cardiovascular:  S1, S2 without m/r/g  Respiratory: CTA B without w/r/r  Abdomen:  +bs, soft, nt, nd  Ext: + lower extremity edema  Psychiatric: mood and affect appropriate  Musculoskeletal:  Rom, muscular strength intact    Data:   Labs:  CBC:   Recent Labs     11/02/20  1130  11/03/20  0446 11/04/20  0300 11/05/20  0433   WBC 9.8  --   --   --   --    HGB 11.3*  11.4*   < > 11.3* 10.7* 10.4*     --   --   --   --     < > = values in this interval not displayed.      BMP:    Recent Labs     11/03/20  0426 11/04/20  0300 11/05/20  0433    136 145   K 3.9 3.7 4.1   CL 98* 99 109   CO2 30 28 28   BUN 13 15 13   CREATININE 1.7* 1.5* 1.3   GLUCOSE 107* 128* 159*     Ca/Mg/Phos:   Recent Labs     11/03/20 0426 11/04/20  0300 11/05/20  0433   CALCIUM 10.0 9.4 9.4     Hepatic: No results for [place]  Office : [phone]  Fax :[phone]

## 2020-11-05 NOTE — DISCHARGE INSTR - COC
(acute kidney injury) ([facility] [address] N17.9       Isolation/Infection:   Isolation            Droplet Plus          Patient Infection Status       Infection Onset Added Last Indicated Last Indicated By Review Planned Expiration Resolved Resolved By    COVID-19 10/30/20 11/01/20 10/30/20 COVID-19 20      Resolved    COVID-19 Rule Out 10/30/20 10/30/20 10/30/20 COVID-19 (Ordered)   20 Rule-Out Test Resulted    COVID-19 10/06/20 10/09/20 10/06/20 COVID-19   10/20/20     COVID-19 Rule Out 10/06/20 10/06/20 10/06/20 COVID-19 (Ordered)   10/09/20 Rule-Out Test Resulted            Nurse Assessment:  Last Vital Signs: /74   Pulse 79   Temp 99.3 °F (37.4 °C) (Axillary)   Resp 16   Ht 5' 10\" (1.778 m)   Wt 213 lb 6.5 oz (96.8 kg)   SpO2 95%   BMI 30.62 kg/m²     Last documented pain score (0-10 scale): Pain Level: 0  Last Weight:   Wt Readings from Last 1 Encounters:   20 213 lb 6.5 oz (96.8 kg)     Mental Status:  oriented and alert    IV Access:  - None    Nursing Mobility/ADLs:  Walking   Independent  Transfer  Assisted  Bathing  Assisted  Dressing  Assisted  Toileting  Assisted  Feeding  Independent  Med Admin  Independent  Med Delivery   whole    Wound Care Documentation and Therapy:        Elimination:  Continence: Bowel: Yes  Bladder: Yes  Urinary Catheter: Removal Date 2020   Colostomy/Ileostomy/Ileal Conduit: No       Date of Last BM: 11/3/20    Intake/Output Summary (Last 24 hours) at 2020 0807  Last data filed at 2020 0636  Gross per 24 hour   Intake 1648.37 ml   Output 1750 ml   Net -101.63 ml     I/O last 3 completed shifts: In: 1648.4 [P.O.:700;  I.V.:878.5; IV Piggyback:69.9]  Out: 1750 [Urine:1750]    Safety Concerns:     None    Impairments/Disabilities:      None    Nutrition Therapy:  Current Nutrition Therapy:   - Oral Diet:  General    Routes of Feeding: Oral  Liquids: No Restrictions  Daily Fluid Restriction: no  Last Modified Barium Swallow with Video (Video Swallowing Test): not done    Treatments at the Time of Hospital Discharge:   Respiratory Treatments: BIPAP at night  Oxygen Therapy:  is not on home oxygen therapy. Ventilator:    - BiPAP   IPAP: 12 cmH20, CPAP/EPAP: 5 cmH2O only when sleeping    Rehab Therapies: N/a  Weight Bearing Status/Restrictions: No weight bearing restirctions  Other Medical Equipment (for information only, NOT a DME order):  None  Other Treatments: ***    Patient's personal belongings (please select all that are sent with patient):  None    RN SIGNATURE:  Electronically signed by Bradly Ballard RN on 11/5/20 at 1:00 PM EST    CASE MANAGEMENT/SOCIAL WORK SECTION    Inpatient Status Date: ***    Readmission Risk Assessment Score:  Readmission Risk              Risk of Unplanned Readmission:        13           Discharging to Facility/ Agency   Name:   [address] [place] [address] [place] [id number]       Phone: [phone]       Fax: [phone]              / signature: Electronically signed by Marie Jiménez RN on 11/5/20 at 1:21 PM EST        PHYSICIAN SECTION    Prognosis: Fair    Condition at Discharge: Stable    Rehab Potential (if transferring to Rehab): Fair      Recommended Labs or Other Treatments After Discharge: Follow urine culture data to ensure appropriateness of current empiric antibx  BMP in 5-7 days  CBC in 3-5 days  Psychiatry eval. : Please pxt's medication must be reviewed by psychiatrist. Decrease doses further if noted to be over-sedated. Nephrology    BiPAP with sleep      Physician Certification: I certify the above information and transfer of Ashwin Hernandez  is necessary for the continuing treatment of the diagnosis listed and that he requires [facility] for greater 30 days.      Update Admission H&P: No change in H&P Please see DC summary    PHYSICIAN SIGNATURE:  Electronically signed by Marleny Haynes MD on 11/5/20 at 8:10 AM EST

## 2020-11-05 NOTE — PROGRESS NOTES
Patient sister called and update on patient status and plan of care.  Electronically signed by Calvin Robison RN on 11/5/2020 at 5:57 PM

## 2020-11-05 NOTE — DISCHARGE SUMMARY
Hospital Medicine Discharge Summary    Patient ID: Vernelle Ahumada      Patient's PCP: Rafia Ahuja MD    Admit Date: 10/30/2020     Discharge Date:   11/5/2020    Admitting Physician: Jocelin Jean MD     Discharge Physician: Jocelin Jean MD     Discharge Diagnoses:  As below      The patient was seen and examined on day of discharge and this discharge summary is in conjunction with any daily progress note from day of discharge. Hospital Course:   76 y. o. male with past medical history of CAD, hypertension, diabetes mellitus, JONATHAN, paranoid schizophrenia who presented to the ED with rectal bleeding.      Recently admitted 10/7/2020 to 10/16/2020 for  AMS, and noted with  Sepsis secondary to [address]. Was initially on vancomycin and meropenem x 4 days, as well as acyclovir for possible meningitis. Cx data negative and CSF suggestive of aseptic lymphocytic meningitis. Meningitis encephalitis panel negative. CTPA was negative for PE. +small air space opacities. He was treated with Remdesivir. His Acute metabolic encephalopathy was felt likely secondary to Covid19/aseptic meningitis as well as underlying schizophrenia, and improved at time of discharge to facility, from where he was brought to the Ed due to concern for rectal bleeding.     In the ED, he was afebrile. Sats were 88-94% and he required supplemental oxygen. Labs showed stable hgb 11.2. MCV was 95. Other labs were satisfactory. CXR showed Pulmonary vascular congestion without localized consolidation in this patient with low lung volumes.     There was concern for GI bleed, on account of which he was admitted for further evaluation and management. Acute GI bleed  - Pxt was on Mobic at home: Held. - Red blood noted in Ed per ED physician  - Hgb looks stable c/f recent  - Clears  - Protonix  - Typed and screen blood  - Colonoscopy: Hemmorrhoids with prior hemmorrhoidal bleeding. Colon polyp removed.  No active LGI bleeding.  -Trend hgb: will decrease freq.           Acute encephalopathy. POA  - Probably toxic encephalopathy related to meds  - On decent dose of Olanzapine. Also on trileptal, thorazine  - Evaluated for other etiology, like infections: not found  - Also component of metabolic encephalopathy related to hypercapnia but even this may be due to too much sedating meds. He also reportedly has a hx of JONATHAN; not been using CPAP at facility due to recent COVID.  - COVID testing positive.  - NH3: Not signif elevated  - Routine labs appear satisfactory  - Was decently evaluated during recent admission for mental status changes  - Held olanzapine and trileptal and thorazine:  mental status  improved  -  Psych consulted to review his meds, and ensure appropriateness and doses. Unfortunately, no psych service in house. - Will resume his psych meds at lower doses, brenna as he has remained satisfactory while off them : hopefully we can control his behavior without over sedating him, or prolonging his QTc too much to place him at risk of potentially life-threatening complications. - Adjust doses further as needed. - Psychiatry follow up ASAP             UTI  Started on antibx  - Pxt is listed as \"allergic to Pen/Cephal: On records review: rash  - Hesitating to use FQ due to QT risk, brenna as on other QT meds. - Started on Ceftriaxone with monitoring.  Tolerating   - Follow cx data to ensure appropriateness of antibx.        Prolonged QTc  - QTc prolonged, likely sec to ventricular pacing  - Monitored on Telemetry        CAD/ishcemic cardiomyopathy  - Has ICD  - Continue home meds  - Resume lisinopril if/when ok with nephrology  - F/u BMP in 3-5 days  - O/p with [facility] cardiology at [facility] as before.        Sick sinus syndrome:  Status post pacemaker placement        JOSÉ   - Lisinopril/HCTZ held  - Nephrology consulted  - Started on IVF per nephro  - Cr appears improved  - F/u BMP in 3-5 days  - F/u with nephro           Recent COVID PNA with Respiratory failure, Sepsis,  Encephalopathy and Aseptic meningitis  - Admitted 10/7/2020 to 10/16/2020   - Diagnosed then and treated for COVID  - Was treated with on decadron, Leslie Jimenez still positive   - Continue supportive care           Mild acute Chronic systolic heart failure  - Poor historian based on psych hx  - Required oxygen on presentation in the ED  - CXR repeat: improved. - Does not appear to be on home diuretics, received fluids last admission  - Pro BNP, 300s; improved with lasix but renal function worsened requiring some fluids. - Dly weights, I/O monitoring  - O/p f/u with cardiology  - O/p dly weights           JONATHAN  -  BiPAP  - Continue nightly BiPAP/oxygen           Diabetes mellitus type 2:  No recent A1C  Not on DM meds at home  Monitor blood glucose with AM labs: satisfactory           Paranoid schizophrenia:  On oxcarbazepine and chlorpromazine and Zyprexa  Recurrent issues with encephalopathy  ? If on too much meds  - Held olanzapine and trileptal and thorazine:  mental status  improved  -  Psych consulted to review his meds, and ensure appropriateness and doses. Unfortunately, no psych service in house. - Will resume his psych meds at lower doses, brenna as he has remained satisfactory while off them : hopefully we can control his behavior without over sedating him, or prolonging his QTc too much to place him at risk of potentially life-threatening complications. - We have confirmed he will be reviewed by psychiatry when back at [facility]  - Will resume his meds at lower doses pending evaluation by psych  - Adjust meds further depending on mental status/symptoms of his schizophrenia.                 Physical Exam Performed:     /83   Pulse 77   Temp 98 °F (36.7 °C) (Oral)   Resp 20   Ht 5' 10\" (1.778 m)   Wt 213 lb 6.5 oz (96.8 kg)   SpO2 95%   BMI 30.62 kg/m²     General appearance:  NAD Obese  Heart[de-identified] Normal s1/s2, RRR, no murmurs, gallops, or rubs.  No leg edema  Lungs:  Diminished breath sounds brenna at bases. Abdomen: Soft, non-tender, non-distended, bowel sounds present, no masses  Musculoskeletal:  No clubbing, no cyanosis, or edema  Skin: No lesion or masses  Neurologic:  Grossly non-focal.  Psychiatric:  Pxt is stable today      Labs: For convenience and continuity at follow-up the following most recent labs are provided:      CBC:    Lab Results   Component Value Date    WBC 9.8 11/02/2020    HGB 10.4 11/05/2020    HCT 32.4 11/05/2020     11/02/2020       Renal:    Lab Results   Component Value Date     11/05/2020    K 4.1 11/05/2020     11/05/2020    CO2 28 11/05/2020    BUN 13 11/05/2020    CREATININE 1.3 11/05/2020    CALCIUM 9.4 11/05/2020    PHOS 3.5 01/01/2010         Significant Diagnostic Studies    Radiology:   XR CHEST PORTABLE   Final Result      Cardiomegaly. Increased density in the left base. Improved overall pulmonary congestion since the prior study. Right lung is fairly clear. XR CHEST PORTABLE   Final Result      1. Stable cardiomegaly with multilead left-sided pacing device. 2.  Pulmonary vascular congestion without localized consolidation in this patient with low lung volumes. Consults:     IP CONSULT TO HOSPITALIST  IP CONSULT TO GI  IP CONSULT TO NEPHROLOGY  IP CONSULT TO PHARMACY  IP CONSULT TO PSYCHIATRY    Disposition:  Back to [facility]     Condition at Discharge: Stable    Discharge Instructions/Follow-up:    PCP  Psychiatry    Code Status:  Full Code     Activity: activity as tolerated    Diet: cardiac diet      Discharge Medications:     Current Discharge Medication List           Details   cefdinir (OMNICEF) 300 MG capsule Take 1 capsule by mouth 2 times daily for 7 days Pxt tolerating ceftriaxone.   Qty: 14 capsule, Refills: 0      Probiotic Acidophilus (FLORANEX) TABS Take 1 tablet by mouth 2 times daily  Qty: 60 tablet, Refills: 0      omeprazole (PRILOSEC) 20 MG delayed release capsule Take 1 capsule by mouth every morning (before breakfast)  Qty: 90 capsule, Refills: 1              Details   OLANZapine (ZYPREXA) 10 MG tablet Take 1 tablet by mouth nightly  Qty: 30 tablet, Refills: 3      OXcarbazepine (TRILEPTAL) 300 MG tablet Take 1 tablet by mouth 2 times daily  Qty: 60 tablet, Refills: 3      chlorproMAZINE (THORAZINE) 50 MG tablet Take 0.5 tablets by mouth 2 times daily  Qty: 60 tablet, Refills: 0              Details   methocarbamol (ROBAXIN) 500 MG tablet Take 1 tablet by mouth 3 times daily as needed (muscle spasms)  Qty: 30 tablet, Refills: 0      polyethylene glycol (MIRALAX) 17 g PACK packet Take 17 g by mouth as needed      calcium carbonate (TUMS) 500 MG chewable tablet Take 2 tablets by mouth 2 times daily as needed for Heartburn      !! ipratropium-albuterol (DUONEB) 0.5-2.5 (3) MG/3ML SOLN nebulizer solution Inhale 3 mLs into the lungs every 4 hours as needed for Shortness of Breath  Qty: 360 mL, Refills: 0      !! ipratropium-albuterol (DUONEB) 0.5-2.5 (3) MG/3ML SOLN nebulizer solution Inhale 3 mLs into the lungs 2 times daily  Qty: 360 mL, Refills: 0      fluocinonide (LIDEX) 0.05 % external solution Apply to scalp topically as needed      ketoconazole (NIZORAL) 2 % shampoo Apply topically Twice a Week Apply to scalp every night shift on Tues and Saturday -- massage into scalp and leave on for 5-10 min      ketoconazole (NIZORAL) 2 % cream Apply topically daily Apply topically to face and ears daily. dextromethorphan-guaiFENesin (MUCINEX DM)  MG per extended release tablet Take 1 tablet by mouth every 12 hours as needed      phenylephrine-cocoa butter (PREPARATION H) 0.25-88.44 % Place 1 suppository rectally every 8 hours as needed for Hemorrhoids       senna (SENOKOT) 8.6 MG tablet Take 2 tablets by mouth every evening      trolamine salicylate (ASPERCREME) 10 % cream Apply topically to left shoulder 2 times daily as needed.       Omega 3 1000 MG CAPS

## 2020-11-07 LAB
BLOOD CULTURE, ROUTINE: NORMAL
CULTURE, BLOOD 2: NORMAL
ORGANISM: ABNORMAL
URINE CULTURE, ROUTINE: ABNORMAL

## 2020-12-05 PROBLEM — N39.0 UTI (URINARY TRACT INFECTION): Status: RESOLVED | Noted: 2020-11-05 | Resolved: 2020-12-05

## 2020-12-10 NOTE — ACP (ADVANCE CARE PLANNING)
Advance Care Planning   Advance Care Planning Clinical Specialist  Conversation Note      Date of ACP Conversation: 11/4/2020    Conversation Conducted with:      Healthcare Decision Maker:  sister Flowers/ SUNNY    ACP Clinical Specialist: Chavez Stevens      *When Decision Maker makes decisions on behalf of the incapacitated patient: Decision Maker is asked to consider and make decisions based on patient values, known preferences, or best interests. Current Designated Health Care Decision Maker:   Primary Decision Maker: Jake Gibson - Brother/Sister - [phone] (tel) or [phone] (cell)  (as entered in [address] field. Validate  this information as still accurate & up-to-date; edit [place] field as needed.)    If no Decision Maker listed above or available through scanned documents, then:    [address] [place] [address]   Who do you trust to make healthcare decisions for you? Name:   sister Flowers  Phone  Number: [phone]  Can this person be reached and be able to respond quickly, such as within a few minutes or hours? yes    Who would be your back-up decision maker? Name Marina [facility]ise [facility]  Phone Number:[phone]    For below questions, when conducting conversation with Tee, substitute \"he\" and \"his\" for \"you\" and \"your\". Hospitalization  If your health were to worsen and it became clear that your chance of recovery was unlikely, what would your preferences be regarding hospitalization?:    Choice:  [x]  The patient would want hospitalization  []  The patient would prefer comfort-focused treatment without hospitalization. Ventilation  If you were in your present state of health and suddenly became very ill and were unable to breathe on your own, what would your preference be about the use of a ventilator (breathing machine) if it were available to you?       If patient would desire the use of a ventilator (breathing machine), answer \"yes\", if not \"no\":yes    If your health were to worsen and it became clear that your chance of recovery was unlikely, would that change your answer? no    Resuscitation  CPR works best to restart the heart when there is a sudden event, like a heart attack, in someone who is otherwise healthy. Unfortunately, CPR does not typically restart the heart for people who have serious health conditions or who are very sick. In the event your heart stopped, would you want attempts to restart your heart (answer \"yes\") or would you prefer a natural death (answer \"no\")? yes    If your health were to worsen and it became clear that your chance of recovery was unlikely, would that change your answer? no    [x] Yes  [] No   Educated Patient / Braulio Owen regarding differences between Advance Directives and portable DNR orders.     Length of ACP Conversation in minutes: 15 minutes    Conversation Outcomes:  [x] ACP discussion completed  [] Existing advance directive reviewed with patient; no changes to patient's previously recorded wishes   [] New Advance Directive completed   [] Portable Do Not Rescitate prepared for Provider review and signature  [] POLST/POST/MOLST/MOST prepared for Provider review and signature      Follow-up plan:    [] Schedule follow-up conversation to continue planning  [] Referred individual to Provider for additional questions/concerns   [] Advised patient/agent/surrogate to review completed ACP document and update if needed with changes in condition, patient preferences or care setting     [] This note routed to one or more involved healthcare providers Unable to assess due to medical condition

## 2020-12-28 NOTE — PROGRESS NOTES
Nephrology  Note                                                                                                                                                                                                                                                                                                                                                               Office : [phone]     Fax :[phone]              Patient's Name: Gemma Scales better   On Oxygen   BP stable       Past Medical History:   Diagnosis Date    Anemia     CAD (coronary artery disease)     Depression     [facility] ([facility] [address]     GERD (gastroesophageal reflux disease)     Hypertension     Left anterior fascicular block     JONATHAN (obstructive sleep apnea)     Pacemaker     Paranoid schizophrenia ([facility] [address]     Pneumonia due to 2019-nCoV 10/10/2020    Rash     Right bundle branch block     Sinus bradycardia     Sinusitis        Past Surgical History:   Procedure Laterality Date    COLONOSCOPY N/A 11/2/2020    COLONOSCOPY POLYPECTOMY SNARE/COLD BIOPSY performed by Dorota Rajan MD at [address]         Family History   Problem Relation Age of Onset    No Known Problems Mother     No Known Problems Father         reports that he has never smoked. He has never used smokeless tobacco. He reports that he does not drink alcohol or use drugs. Allergies:  Cephalosporins, Lorazepam, and Pcn [penicillins]    Current Medications:    No current facility-administered medications for this encounter.        Review of Systems:   14 point ROS obtained but were negative except mentioned in HPI      Physical exam:     Vitals:  /89   Pulse 67   Temp 97.6 °F (36.4 °C) (Oral)   Resp 18   Ht 5' 10\" (1.778 m)   Wt 211 lb 3.2 oz (95.8 kg)   SpO2 92%   BMI 30.30 kg/m²   Constitutional:  OAA X3 NAD  Skin: no rash, turgor wnl  Heent:  eomi, mmm  Neck: no bruits or jvd

## 2022-01-27 NOTE — CARE COORDINATION
Abdomen , soft, nontender, nondistended , no guarding or rigidity , no masses palpable , normal bowel sounds , Liver and Spleen , no hepatomegaly present , no hepatosplenomegaly , liver nontender , spleen not palpable Case Management Assessment           Daily Note                 Date/ Time of Note: 11/4/2020 2:41 PM         Note completed by: Demarcus Bui    Patient Name: Kaitlin Villanueva  YOB: 1955    Diagnosis:JOSÉ (acute kidney injury) [facility]) [N17.9]  JOSÉ (acute kidney injury) ([facility] [N17.9]  Patient Admission Status: Inpatient    Date of Admission:10/30/2020  7:20 AM Length of Stay: 5 GLOS:      Current Plan of Care: IVF and IV abx  ________________________________________________________________________________________  PT AM-PAC:   / 24 per last evaluation on:     OT AM-PAC:   / 24 per last evaluation on:     DME Needs for discharge: n/a  ________________________________________________________________________________________  Discharge Plan: [address] [place] [address] ([facility]): [address] [place] [address]    Tentative discharge date: 11.5    Current barriers to discharge: medical clearance      ________________________________________________________________________________________  Case Management Notes:  Patient is from [address] [facility]. Patient can return to [facility] when medically stable, they do have psychiatrist that can follow up once back at the [address]. Bang Nina and his family were provided with choice of provider; he and his family are in agreement with the discharge plan.     Care Transition Patient: Ingrid Bui RN  [facility]  Case Management Department  Ph: [phone]  Fax: [phone]

## 2023-07-29 ENCOUNTER — APPOINTMENT (OUTPATIENT)
Dept: GENERAL RADIOLOGY | Age: 68
DRG: 062 | End: 2023-07-29
Payer: MEDICARE

## 2023-07-29 ENCOUNTER — APPOINTMENT (OUTPATIENT)
Dept: CT IMAGING | Age: 68
DRG: 062 | End: 2023-07-29
Payer: MEDICARE

## 2023-07-29 ENCOUNTER — HOSPITAL ENCOUNTER (INPATIENT)
Age: 68
LOS: 2 days | Discharge: HOME | DRG: 062 | End: 2023-08-01
Attending: EMERGENCY MEDICINE | Admitting: INTERNAL MEDICINE
Payer: MEDICARE

## 2023-07-29 DIAGNOSIS — I63.9 CEREBROVASCULAR ACCIDENT (CVA), UNSPECIFIED MECHANISM (HCC): Primary | ICD-10-CM

## 2023-07-29 DIAGNOSIS — I50.20 HEART FAILURE WITH REDUCED EJECTION FRACTION (HCC): ICD-10-CM

## 2023-07-29 LAB
GLUCOSE BLD-MCNC: 101 MG/DL (ref 70–99)
PERFORMED ON: ABNORMAL

## 2023-07-29 PROCEDURE — 37195 THROMBOLYTIC THERAPY STROKE: CPT

## 2023-07-29 PROCEDURE — 3E03317 INTRODUCTION OF OTHER THROMBOLYTIC INTO PERIPHERAL VEIN, PERCUTANEOUS APPROACH: ICD-10-PCS | Performed by: INTERNAL MEDICINE

## 2023-07-29 PROCEDURE — 93005 ELECTROCARDIOGRAM TRACING: CPT | Performed by: EMERGENCY MEDICINE

## 2023-07-29 PROCEDURE — 96374 THER/PROPH/DIAG INJ IV PUSH: CPT

## 2023-07-29 PROCEDURE — 99285 EMERGENCY DEPT VISIT HI MDM: CPT

## 2023-07-29 PROCEDURE — 6360000002 HC RX W HCPCS: Performed by: EMERGENCY MEDICINE

## 2023-07-29 PROCEDURE — 70450 CT HEAD/BRAIN W/O DYE: CPT

## 2023-07-29 PROCEDURE — 6360000002 HC RX W HCPCS

## 2023-07-29 PROCEDURE — 6360000004 HC RX CONTRAST MEDICATION: Performed by: EMERGENCY MEDICINE

## 2023-07-29 PROCEDURE — 2580000003 HC RX 258: Performed by: EMERGENCY MEDICINE

## 2023-07-29 PROCEDURE — 71045 X-RAY EXAM CHEST 1 VIEW: CPT

## 2023-07-29 PROCEDURE — 70496 CT ANGIOGRAPHY HEAD: CPT

## 2023-07-29 RX ORDER — SODIUM CHLORIDE 0.9 % (FLUSH) 0.9 %
5-40 SYRINGE (ML) INJECTION PRN
Status: DISCONTINUED | OUTPATIENT
Start: 2023-07-29 | End: 2023-07-30

## 2023-07-29 RX ORDER — SODIUM CHLORIDE 0.9 % (FLUSH) 0.9 %
10 SYRINGE (ML) INJECTION ONCE
Status: COMPLETED | OUTPATIENT
Start: 2023-07-29 | End: 2023-07-29

## 2023-07-29 RX ORDER — SODIUM CHLORIDE 9 MG/ML
INJECTION, SOLUTION INTRAVENOUS PRN
Status: DISCONTINUED | OUTPATIENT
Start: 2023-07-29 | End: 2023-08-01 | Stop reason: HOSPADM

## 2023-07-29 RX ORDER — SODIUM CHLORIDE 0.9 % (FLUSH) 0.9 %
5-40 SYRINGE (ML) INJECTION EVERY 12 HOURS SCHEDULED
Status: DISCONTINUED | OUTPATIENT
Start: 2023-07-29 | End: 2023-08-01 | Stop reason: HOSPADM

## 2023-07-29 RX ORDER — LEVETIRACETAM 500 MG/5ML
1500 INJECTION, SOLUTION, CONCENTRATE INTRAVENOUS ONCE
Status: COMPLETED | OUTPATIENT
Start: 2023-07-29 | End: 2023-07-29

## 2023-07-29 RX ADMIN — LEVETIRACETAM 1500 MG: 500 INJECTION INTRAVENOUS at 23:54

## 2023-07-29 RX ADMIN — SODIUM CHLORIDE, PRESERVATIVE FREE 10 ML: 5 INJECTION INTRAVENOUS at 23:30

## 2023-07-29 RX ADMIN — IOPAMIDOL 75 ML: 755 INJECTION, SOLUTION INTRAVENOUS at 23:07

## 2023-07-29 RX ADMIN — SODIUM CHLORIDE, PRESERVATIVE FREE 10 ML: 5 INJECTION INTRAVENOUS at 23:33

## 2023-07-29 RX ADMIN — Medication 10 ML: at 23:30

## 2023-07-29 RX ADMIN — Medication 22 MG: at 23:31

## 2023-07-30 ENCOUNTER — APPOINTMENT (OUTPATIENT)
Dept: CT IMAGING | Age: 68
DRG: 062 | End: 2023-07-30
Payer: MEDICARE

## 2023-07-30 PROBLEM — R53.1 ACUTE RIGHT-SIDED WEAKNESS: Status: ACTIVE | Noted: 2023-07-30

## 2023-07-30 PROBLEM — I63.9 ACUTE CVA (CEREBROVASCULAR ACCIDENT) (HCC): Status: ACTIVE | Noted: 2023-07-30

## 2023-07-30 PROBLEM — Z92.82 RECEIVED INTRAVENOUS TISSUE PLASMINOGEN ACTIVATOR (TPA) IN EMERGENCY DEPARTMENT: Status: ACTIVE | Noted: 2023-07-30

## 2023-07-30 PROBLEM — I63.312 CEREBROVASCULAR ACCIDENT (CVA) DUE TO THROMBOSIS OF LEFT MIDDLE CEREBRAL ARTERY (HCC): Status: ACTIVE | Noted: 2023-07-30

## 2023-07-30 LAB
ALBUMIN SERPL-MCNC: 3.9 G/DL (ref 3.4–5)
ALBUMIN/GLOB SERPL: 1.1 {RATIO} (ref 1.1–2.2)
ALP SERPL-CCNC: 94 U/L (ref 40–129)
ALT SERPL-CCNC: 13 U/L (ref 10–40)
ANION GAP SERPL CALCULATED.3IONS-SCNC: 10 MMOL/L (ref 3–16)
AST SERPL-CCNC: 22 U/L (ref 15–37)
BASE EXCESS BLDA CALC-SCNC: 1.3 MMOL/L (ref -3–3)
BASOPHILS # BLD: 0.1 K/UL (ref 0–0.2)
BASOPHILS NFR BLD: 1.1 %
BILIRUB SERPL-MCNC: <0.2 MG/DL (ref 0–1)
BUN SERPL-MCNC: 17 MG/DL (ref 7–20)
CALCIUM SERPL-MCNC: 9.9 MG/DL (ref 8.3–10.6)
CHLORIDE SERPL-SCNC: 105 MMOL/L (ref 99–110)
CHOLEST SERPL-MCNC: 186 MG/DL (ref 0–199)
CK SERPL-CCNC: 154 U/L (ref 39–308)
CO2 BLDA-SCNC: 29 MMOL/L
CO2 SERPL-SCNC: 26 MMOL/L (ref 21–32)
COHGB MFR BLDA: 0.9 % (ref 0–1.5)
CREAT SERPL-MCNC: 1.4 MG/DL (ref 0.8–1.3)
DEPRECATED RDW RBC AUTO: 15.4 % (ref 12.4–15.4)
EKG ATRIAL RATE: 60 BPM
EKG DIAGNOSIS: NORMAL
EKG P-R INTERVAL: 206 MS
EKG Q-T INTERVAL: 534 MS
EKG QRS DURATION: 180 MS
EKG QTC CALCULATION (BAZETT): 534 MS
EKG R AXIS: -87 DEGREES
EKG T AXIS: 89 DEGREES
EKG VENTRICULAR RATE: 60 BPM
EOSINOPHIL # BLD: 0.2 K/UL (ref 0–0.6)
EOSINOPHIL NFR BLD: 2.8 %
GFR SERPLBLD CREATININE-BSD FMLA CKD-EPI: 54 ML/MIN/{1.73_M2}
GLUCOSE SERPL-MCNC: 86 MG/DL (ref 70–99)
HCO3 BLDA-SCNC: 27 MMOL/L (ref 21–29)
HCT VFR BLD AUTO: 35.7 % (ref 40.5–52.5)
HDLC SERPL-MCNC: 43 MG/DL (ref 40–60)
HGB BLD-MCNC: 11.4 G/DL (ref 13.5–17.5)
HGB BLDA-MCNC: 11.6 G/DL
INR PPP: 1.02 (ref 0.84–1.16)
LACTATE BLDV-SCNC: 1.6 MMOL/L (ref 0.4–2)
LDLC SERPL CALC-MCNC: 118 MG/DL
LYMPHOCYTES # BLD: 2 K/UL (ref 1–5.1)
LYMPHOCYTES NFR BLD: 35.2 %
MCH RBC QN AUTO: 28.8 PG (ref 26–34)
MCHC RBC AUTO-ENTMCNC: 31.9 G/DL (ref 31–36)
MCV RBC AUTO: 90.2 FL (ref 80–100)
METHGB MFR BLDA: 0.3 % (ref 0–1.4)
MONOCYTES # BLD: 0.4 K/UL (ref 0–1.3)
MONOCYTES NFR BLD: 7.3 %
NEUTROPHILS # BLD: 3 K/UL (ref 1.7–7.7)
NEUTROPHILS NFR BLD: 53.6 %
PCO2 BLDA: 46.6 MMHG (ref 35–45)
PH BLDA: 7.37 [PH] (ref 7.35–7.45)
PLATELET # BLD AUTO: 187 K/UL (ref 135–450)
PMV BLD AUTO: 8.9 FL (ref 5–10.5)
PO2 BLDA: 69.7 MMHG (ref 75–108)
POTASSIUM SERPL-SCNC: 4.8 MMOL/L (ref 3.5–5.1)
POTASSIUM SERPL-SCNC: 5.9 MMOL/L (ref 3.5–5.1)
PROT SERPL-MCNC: 7.3 G/DL (ref 6.4–8.2)
PROTHROMBIN TIME: 13.4 SEC (ref 11.5–14.8)
RBC # BLD AUTO: 3.96 M/UL (ref 4.2–5.9)
SAO2 % BLDA: 94 % (ref 93–100)
SODIUM SERPL-SCNC: 141 MMOL/L (ref 136–145)
TRIGL SERPL-MCNC: 127 MG/DL (ref 0–150)
TROPONIN, HIGH SENSITIVITY: 29 NG/L (ref 0–22)
TROPONIN, HIGH SENSITIVITY: 29 NG/L (ref 0–22)
VLDLC SERPL CALC-MCNC: 25 MG/DL
WBC # BLD AUTO: 5.6 K/UL (ref 4–11)

## 2023-07-30 PROCEDURE — 92610 EVALUATE SWALLOWING FUNCTION: CPT

## 2023-07-30 PROCEDURE — 83605 ASSAY OF LACTIC ACID: CPT

## 2023-07-30 PROCEDURE — 83036 HEMOGLOBIN GLYCOSYLATED A1C: CPT

## 2023-07-30 PROCEDURE — 2580000003 HC RX 258: Performed by: STUDENT IN AN ORGANIZED HEALTH CARE EDUCATION/TRAINING PROGRAM

## 2023-07-30 PROCEDURE — 84484 ASSAY OF TROPONIN QUANT: CPT

## 2023-07-30 PROCEDURE — 80053 COMPREHEN METABOLIC PANEL: CPT

## 2023-07-30 PROCEDURE — 82550 ASSAY OF CK (CPK): CPT

## 2023-07-30 PROCEDURE — 85610 PROTHROMBIN TIME: CPT

## 2023-07-30 PROCEDURE — APPNB45 APP NON BILLABLE 31-45 MINUTES: Performed by: NURSE PRACTITIONER

## 2023-07-30 PROCEDURE — 6370000000 HC RX 637 (ALT 250 FOR IP): Performed by: STUDENT IN AN ORGANIZED HEALTH CARE EDUCATION/TRAINING PROGRAM

## 2023-07-30 PROCEDURE — 36415 COLL VENOUS BLD VENIPUNCTURE: CPT

## 2023-07-30 PROCEDURE — 84132 ASSAY OF SERUM POTASSIUM: CPT

## 2023-07-30 PROCEDURE — 85025 COMPLETE CBC W/AUTO DIFF WBC: CPT

## 2023-07-30 PROCEDURE — 94669 MECHANICAL CHEST WALL OSCILL: CPT

## 2023-07-30 PROCEDURE — 94761 N-INVAS EAR/PLS OXIMETRY MLT: CPT

## 2023-07-30 PROCEDURE — 2000000000 HC ICU R&B

## 2023-07-30 PROCEDURE — 70450 CT HEAD/BRAIN W/O DYE: CPT

## 2023-07-30 PROCEDURE — 99223 1ST HOSP IP/OBS HIGH 75: CPT | Performed by: INTERNAL MEDICINE

## 2023-07-30 PROCEDURE — 82803 BLOOD GASES ANY COMBINATION: CPT

## 2023-07-30 PROCEDURE — 6360000002 HC RX W HCPCS: Performed by: STUDENT IN AN ORGANIZED HEALTH CARE EDUCATION/TRAINING PROGRAM

## 2023-07-30 PROCEDURE — 99291 CRITICAL CARE FIRST HOUR: CPT | Performed by: PSYCHIATRY & NEUROLOGY

## 2023-07-30 PROCEDURE — 80061 LIPID PANEL: CPT

## 2023-07-30 RX ORDER — ASPIRIN 81 MG/1
81 TABLET, CHEWABLE ORAL DAILY
Status: DISCONTINUED | OUTPATIENT
Start: 2023-07-31 | End: 2023-08-01 | Stop reason: HOSPADM

## 2023-07-30 RX ORDER — ONDANSETRON 2 MG/ML
4 INJECTION INTRAMUSCULAR; INTRAVENOUS EVERY 6 HOURS PRN
Status: DISCONTINUED | OUTPATIENT
Start: 2023-07-30 | End: 2023-08-01 | Stop reason: HOSPADM

## 2023-07-30 RX ORDER — HYDRALAZINE HYDROCHLORIDE 20 MG/ML
10 INJECTION INTRAMUSCULAR; INTRAVENOUS EVERY 6 HOURS PRN
Status: DISCONTINUED | OUTPATIENT
Start: 2023-07-30 | End: 2023-08-01 | Stop reason: HOSPADM

## 2023-07-30 RX ORDER — ATORVASTATIN CALCIUM 80 MG/1
80 TABLET, FILM COATED ORAL NIGHTLY
Status: DISCONTINUED | OUTPATIENT
Start: 2023-07-30 | End: 2023-08-01 | Stop reason: HOSPADM

## 2023-07-30 RX ORDER — SODIUM CHLORIDE 0.9 % (FLUSH) 0.9 %
5-40 SYRINGE (ML) INJECTION EVERY 12 HOURS SCHEDULED
Status: DISCONTINUED | OUTPATIENT
Start: 2023-07-30 | End: 2023-08-01 | Stop reason: HOSPADM

## 2023-07-30 RX ORDER — SODIUM CHLORIDE 0.9 % (FLUSH) 0.9 %
5-40 SYRINGE (ML) INJECTION PRN
Status: DISCONTINUED | OUTPATIENT
Start: 2023-07-30 | End: 2023-08-01 | Stop reason: HOSPADM

## 2023-07-30 RX ORDER — POLYETHYLENE GLYCOL 3350 17 G/17G
17 POWDER, FOR SOLUTION ORAL DAILY PRN
Status: DISCONTINUED | OUTPATIENT
Start: 2023-07-30 | End: 2023-08-01

## 2023-07-30 RX ORDER — ASPIRIN 300 MG/1
300 SUPPOSITORY RECTAL DAILY
Status: DISCONTINUED | OUTPATIENT
Start: 2023-07-31 | End: 2023-08-01 | Stop reason: HOSPADM

## 2023-07-30 RX ORDER — PANTOPRAZOLE SODIUM 40 MG/1
40 TABLET, DELAYED RELEASE ORAL
Status: DISCONTINUED | OUTPATIENT
Start: 2023-07-30 | End: 2023-08-01 | Stop reason: HOSPADM

## 2023-07-30 RX ORDER — OLANZAPINE 5 MG/1
10 TABLET ORAL NIGHTLY
Status: DISCONTINUED | OUTPATIENT
Start: 2023-07-30 | End: 2023-07-31

## 2023-07-30 RX ORDER — DEXTROSE MONOHYDRATE 100 MG/ML
INJECTION, SOLUTION INTRAVENOUS CONTINUOUS PRN
Status: DISCONTINUED | OUTPATIENT
Start: 2023-07-30 | End: 2023-08-01 | Stop reason: HOSPADM

## 2023-07-30 RX ORDER — CHLORPROMAZINE HYDROCHLORIDE 25 MG/1
25 TABLET, FILM COATED ORAL 2 TIMES DAILY
Status: DISCONTINUED | OUTPATIENT
Start: 2023-07-30 | End: 2023-08-01 | Stop reason: HOSPADM

## 2023-07-30 RX ORDER — OXCARBAZEPINE 150 MG/1
300 TABLET, FILM COATED ORAL 2 TIMES DAILY
Status: DISCONTINUED | OUTPATIENT
Start: 2023-07-30 | End: 2023-07-31

## 2023-07-30 RX ORDER — SODIUM CHLORIDE 9 MG/ML
INJECTION, SOLUTION INTRAVENOUS PRN
Status: DISCONTINUED | OUTPATIENT
Start: 2023-07-30 | End: 2023-08-01 | Stop reason: HOSPADM

## 2023-07-30 RX ORDER — IPRATROPIUM BROMIDE AND ALBUTEROL SULFATE 2.5; .5 MG/3ML; MG/3ML
1 SOLUTION RESPIRATORY (INHALATION) EVERY 4 HOURS PRN
Status: DISCONTINUED | OUTPATIENT
Start: 2023-07-30 | End: 2023-08-01 | Stop reason: HOSPADM

## 2023-07-30 RX ORDER — CARVEDILOL 25 MG/1
25 TABLET ORAL 2 TIMES DAILY WITH MEALS
Status: DISCONTINUED | OUTPATIENT
Start: 2023-07-30 | End: 2023-08-01 | Stop reason: HOSPADM

## 2023-07-30 RX ORDER — ONDANSETRON 4 MG/1
4 TABLET, ORALLY DISINTEGRATING ORAL EVERY 8 HOURS PRN
Status: DISCONTINUED | OUTPATIENT
Start: 2023-07-30 | End: 2023-08-01 | Stop reason: HOSPADM

## 2023-07-30 RX ADMIN — SODIUM CHLORIDE, PRESERVATIVE FREE 10 ML: 5 INJECTION INTRAVENOUS at 21:47

## 2023-07-30 RX ADMIN — ATORVASTATIN CALCIUM 80 MG: 80 TABLET, FILM COATED ORAL at 21:01

## 2023-07-30 RX ADMIN — CARVEDILOL 25 MG: 25 TABLET, FILM COATED ORAL at 18:59

## 2023-07-30 RX ADMIN — OXCARBAZEPINE 300 MG: 150 TABLET, FILM COATED ORAL at 21:01

## 2023-07-30 RX ADMIN — OLANZAPINE 10 MG: 5 TABLET, FILM COATED ORAL at 21:01

## 2023-07-30 RX ADMIN — CHLORPROMAZINE HYDROCHLORIDE 25 MG: 25 TABLET, FILM COATED ORAL at 21:45

## 2023-07-30 RX ADMIN — HYDRALAZINE HYDROCHLORIDE 10 MG: 20 INJECTION INTRAMUSCULAR; INTRAVENOUS at 05:16

## 2023-07-30 RX ADMIN — SODIUM CHLORIDE, PRESERVATIVE FREE 10 ML: 5 INJECTION INTRAVENOUS at 21:44

## 2023-07-30 ASSESSMENT — LIFESTYLE VARIABLES
HOW MANY STANDARD DRINKS CONTAINING ALCOHOL DO YOU HAVE ON A TYPICAL DAY: PATIENT DOES NOT DRINK
HOW OFTEN DO YOU HAVE A DRINK CONTAINING ALCOHOL: NEVER

## 2023-07-30 ASSESSMENT — ENCOUNTER SYMPTOMS
ABDOMINAL PAIN: 0
CHEST TIGHTNESS: 0
SHORTNESS OF BREATH: 0

## 2023-07-30 ASSESSMENT — PAIN SCALES - GENERAL
PAINLEVEL_OUTOF10: 0

## 2023-07-30 NOTE — FLOWSHEET NOTE
4 Eyes Skin Assessment     NAME:  Elli Sarmiento  YOB: 1955  MEDICAL RECORD NUMBER:  [id number]    The patient is being assessed for      I agree that at least one RN has performed a thorough Head to Toe Skin Assessment on the patient. ALL assessment sites listed below have been assessed. Areas assessed by both nurses:    Head, Face, Ears, Shoulders, Back, Chest, Arms, Elbows, Hands, Sacrum. Buttock, Coccyx, Ischium, Legs. Feet and Heels, and Under Medical Devices         Does the Patient have a Wound?  No noted wound(s)       Leonard Prevention initiated by RN: Yes  Wound Care Orders initiated by RN: Yes    Pressure Injury (Stage 3,4, Unstageable, DTI, NWPT, and Complex wounds) if present, place Wound referral order by RN under : No    New Ostomies, if present place, Ostomy referral order under : No     Nurse 1 eSignature: Electronically signed by Alex Escobar RN on 7/30/23 at 6:56 AM EDT    **SHARE this note so that the co-signing nurse can place an eSignature**    Nurse 2 eSignature: Electronically signed by Anneliese Massey RN on 7/30/23 at 7:09 AM EDT

## 2023-07-30 NOTE — PLAN OF CARE
SLP completed evaluation. Please refer to notes in EMR.     Zena Quiroz MA. [address] [facility].[address]

## 2023-07-30 NOTE — PLAN OF CARE
General Internal Medicine Attending    Chart and data reviewed. Admitted earlier today    71 y/o M presents w/ R sided weakness and dysarthria     NIHSS noted to be 6. Acute left hemispheric CVA: POA    Possible seizure sec to stroke: POA    Chronic Heart Failure with reduced EF  Echo 2019 showed EF 30-35%, grade II diastolic dysfunction    Hx of LV dysfunction/Cardiomyopathy: POA  - Unclear etiology    Hx of CHB: s/p PPM: POA  [employer] dual chamber pacemaker in situ  EKG: V paaced rhythm    CKD Stage 3  Cr at baseline around 1.3    Type 2 DM complicated by nephropathy: POA      Head CT normal.   CTA; No LVO. TNK given in ED at 2331    - MRI head w/o contrast if Pacemaker compatible  - Repeat CT head w/o contrast 2330 7/30  - Lipid panel, A1C  - Echo with bubble study for stroke w/u and to re-eval EF    - Device interrogation.     - SBP goal < 180 per TNK protocol    - ASA 81mg 24 hours after TNK    - Patient received loading-dose Keppra; Per neurology, to hold if MRI is negative.  Planned routine EEG.     - Neurology consulted, f/u recommendations    - Neuro checks    - Chemical DVT prophylaxis if no bleed on F/u post TNK CT, and 24 after TNK    Monitor post TNK in the ICU  - PT/OT , SLP  evaluation             Aba Cabrera MD

## 2023-07-30 NOTE — PLAN OF CARE
[facility] Hospitalist brief note  Consult received. Case reviewed with ER physician  21-year-old male diabetic with history of CAD brought in with sudden onset of right-sided weakness and dysarthria he is status post TNK. Full note to follow.     Judy Delgado MD    Thanks  Cj Carrera MD

## 2023-07-30 NOTE — PLAN OF CARE
Problem: Discharge Planning  Goal: Discharge to home or other facility with appropriate resources  Outcome: Progressing     Problem: Pain  Goal: Verbalizes/displays adequate comfort level or baseline comfort level  Outcome: Progressing     Problem: Neurosensory - Adult  Goal: Achieves stable or improved neurological status  Outcome: Progressing  Goal: Absence of seizures  Outcome: Progressing  Goal: Achieves maximal functionality and self care  Outcome: Progressing     Problem: Neurosensory - Adult  Goal: Absence of seizures  Outcome: Progressing     Problem: Neurosensory - Adult  Goal: Achieves maximal functionality and self care  Outcome: Progressing     Problem: Skin/Tissue Integrity - Adult  Goal: Skin integrity remains intact  Outcome: Progressing     Problem: Metabolic/Fluid and Electrolytes - Adult  Goal: Electrolytes maintained within normal limits  Outcome: Progressing

## 2023-07-30 NOTE — ED NOTES
Labs drawn per protocol and sent to lab via tube station     [place], [address] [place] [address]  07/30/23 4088

## 2023-07-30 NOTE — ED NOTES
Report given to Yadi ICU RN. RN verbalized understanding report with no questions nor concerns. Pt updated on POC and amenable and agreeable to plan.      Barb Cabot, RN  07/30/23 0157

## 2023-07-30 NOTE — ED PROVIDER NOTES
ED Attending Attestation Note     Date of evaluation: 7/29/2023    This patient was seen by the resident. I have seen and examined the patient, agree with the workup, evaluation, management and diagnosis. The care plan has been discussed. I have reviewed the ECG and concur with the resident's interpretation. My assessment reveals a 44-year-old male who is brought in by EMS with concern for acute ischemic stroke. His last known well was 7 PM this evening. On my examination he has moderate to severe dysarthria. He initially had flaccid paralysis of the right arm and leg as well as right-sided facial droop. After CT/CTA he did regain some strength in the right arm and leg but is still moderately weak. Evaluated by stroke team who recommended TNK which was administered. There was no LVO on his CTA which caused some concern that this could be due to a focal seizure and a stroke team therefore recommended we load him with Keppra which was performed. Patient will be admitted to the ICU here for further care. Critical Care:  Due to the immediate potential for life-threatening deterioration due to acute ischemic stroke, I spent 75 minutes providing critical care. This time excludes time spent performing procedures but includes time spent on direct patient care, history retrieval, review of the chart, and discussions with patient, family, and consultant(s).        Selene Ruiz MD  07/30/23 1313

## 2023-07-30 NOTE — ED NOTES
This RN called lab due to pt's labs still not processing though they were sent over two hours ago.  Lab stating they could not find blood labs     Lenora Sherman, [address]  07/30/23 0816

## 2023-07-30 NOTE — ED NOTES
Pt comes to the ED via EMS for possible stroke. LNK was 2000. Facility RN noted pt to have unstable gait, slurred speech, and right-sided facial droop. Pt arrives very drowsy with obvious right-sided facial droop, right arm and leg weakness, and slurred speech.       Johanny Chan RN  07/30/23 8586

## 2023-07-30 NOTE — H&P
Age of Onset    No Known Problems Mother     No Known Problems Father        MEDICATIONS:     No current facility-administered medications on file prior to encounter. Current Outpatient Medications on File Prior to Encounter   Medication Sig Dispense Refill    OLANZapine (ZYPREXA) 10 MG tablet Take 1 tablet by mouth nightly 30 tablet 3    OXcarbazepine (TRILEPTAL) 300 MG tablet Take 1 tablet by mouth 2 times daily 60 tablet 3    chlorproMAZINE (THORAZINE) 50 MG tablet Take 0.5 tablets by mouth 2 times daily 60 tablet 0    omeprazole (PRILOSEC) 20 MG delayed release capsule Take 1 capsule by mouth every morning (before breakfast) 90 capsule 1    methocarbamol (ROBAXIN) 500 MG tablet Take 1 tablet by mouth 3 times daily as needed (muscle spasms) 30 tablet 0    polyethylene glycol (MIRALAX) 17 g PACK packet Take 17 g by mouth as needed      calcium carbonate (TUMS) 500 MG chewable tablet Take 2 tablets by mouth 2 times daily as needed for Heartburn      ipratropium-albuterol (DUONEB) 0.5-2.5 (3) MG/3ML SOLN nebulizer solution Inhale 3 mLs into the lungs every 4 hours as needed for Shortness of Breath 360 mL 0    ipratropium-albuterol (DUONEB) 0.5-2.5 (3) MG/3ML SOLN nebulizer solution Inhale 3 mLs into the lungs 2 times daily 360 mL 0    fluocinonide (LIDEX) 0.05 % external solution Apply to scalp topically as needed      ketoconazole (NIZORAL) 2 % shampoo Apply topically Twice a Week Apply to scalp every night shift on Tues and Saturday -- massage into scalp and leave on for 5-10 min      ketoconazole (NIZORAL) 2 % cream Apply topically daily Apply topically to face and ears daily.       dextromethorphan-guaiFENesin (MUCINEX DM)  MG per extended release tablet Take 1 tablet by mouth every 12 hours as needed      phenylephrine-cocoa butter (PREPARATION H) 0.25-88.44 % Place 1 suppository rectally every 8 hours as needed for Hemorrhoids       senna (SENOKOT) 8.6 MG tablet Take 2 tablets by mouth every evening

## 2023-07-31 PROBLEM — I63.9 CEREBROVASCULAR ACCIDENT (CVA) (HCC): Status: ACTIVE | Noted: 2023-07-31

## 2023-07-31 LAB
ALBUMIN SERPL-MCNC: 4.1 G/DL (ref 3.4–5)
ANION GAP SERPL CALCULATED.3IONS-SCNC: 11 MMOL/L (ref 3–16)
BACTERIA URNS QL MICRO: ABNORMAL /HPF
BASE EXCESS BLDV CALC-SCNC: 2.7 MMOL/L (ref -2–3)
BILIRUB UR QL STRIP.AUTO: NEGATIVE
BUN SERPL-MCNC: 15 MG/DL (ref 7–20)
CALCIUM SERPL-MCNC: 10 MG/DL (ref 8.3–10.6)
CHLORIDE SERPL-SCNC: 105 MMOL/L (ref 99–110)
CLARITY UR: CLEAR
CO2 BLDV-SCNC: 32 MMOL/L
CO2 SERPL-SCNC: 25 MMOL/L (ref 21–32)
COHGB MFR BLDV: 1 % (ref 0–1.5)
COLOR UR: YELLOW
CREAT SERPL-MCNC: 1.2 MG/DL (ref 0.8–1.3)
DEPRECATED RDW RBC AUTO: 15.1 % (ref 12.4–15.4)
DO-HGB MFR BLDV: 69.2 %
EPI CELLS #/AREA URNS HPF: ABNORMAL /HPF (ref 0–5)
EST. AVERAGE GLUCOSE BLD GHB EST-MCNC: 102.5 MG/DL
GFR SERPLBLD CREATININE-BSD FMLA CKD-EPI: >60 ML/MIN/{1.73_M2}
GLUCOSE SERPL-MCNC: 120 MG/DL (ref 70–99)
GLUCOSE UR STRIP.AUTO-MCNC: NEGATIVE MG/DL
HBA1C MFR BLD: 5.2 %
HCO3 BLDV-SCNC: 30.1 MMOL/L (ref 24–28)
HCT VFR BLD AUTO: 37.8 % (ref 40.5–52.5)
HGB BLD-MCNC: 12.2 G/DL (ref 13.5–17.5)
HGB UR QL STRIP.AUTO: NEGATIVE
KETONES UR STRIP.AUTO-MCNC: NEGATIVE MG/DL
LEUKOCYTE ESTERASE UR QL STRIP.AUTO: NEGATIVE
MCH RBC QN AUTO: 29 PG (ref 26–34)
MCHC RBC AUTO-ENTMCNC: 32.3 G/DL (ref 31–36)
MCV RBC AUTO: 89.8 FL (ref 80–100)
METHGB MFR BLDV: 0.3 % (ref 0–1.5)
MUCOUS THREADS #/AREA URNS LPF: ABNORMAL /LPF
NITRITE UR QL STRIP.AUTO: NEGATIVE
PCO2 BLDV: 58.7 MMHG (ref 41–51)
PH BLDV: 7.32 [PH] (ref 7.35–7.45)
PH UR STRIP.AUTO: 6 [PH] (ref 5–8)
PHOSPHATE SERPL-MCNC: 3.2 MG/DL (ref 2.5–4.9)
PLATELET # BLD AUTO: 213 K/UL (ref 135–450)
PMV BLD AUTO: 8.7 FL (ref 5–10.5)
PO2 BLDV: <30 MMHG (ref 25–40)
POTASSIUM SERPL-SCNC: 3.7 MMOL/L (ref 3.5–5.1)
PROT UR STRIP.AUTO-MCNC: NEGATIVE MG/DL
RBC # BLD AUTO: 4.21 M/UL (ref 4.2–5.9)
RBC #/AREA URNS HPF: ABNORMAL /HPF (ref 0–4)
SAO2 % BLDV: 30 %
SODIUM SERPL-SCNC: 141 MMOL/L (ref 136–145)
SP GR UR STRIP.AUTO: 1.01 (ref 1–1.03)
UA DIPSTICK W REFLEX MICRO PNL UR: ABNORMAL
URN SPEC COLLECT METH UR: ABNORMAL
UROBILINOGEN UR STRIP-ACNC: 0.2 E.U./DL
WBC # BLD AUTO: 5.8 K/UL (ref 4–11)
WBC #/AREA URNS HPF: ABNORMAL /HPF (ref 0–5)

## 2023-07-31 PROCEDURE — 2060000000 HC ICU INTERMEDIATE R&B

## 2023-07-31 PROCEDURE — 82803 BLOOD GASES ANY COMBINATION: CPT

## 2023-07-31 PROCEDURE — 6360000002 HC RX W HCPCS: Performed by: INTERNAL MEDICINE

## 2023-07-31 PROCEDURE — 80069 RENAL FUNCTION PANEL: CPT

## 2023-07-31 PROCEDURE — 81001 URINALYSIS AUTO W/SCOPE: CPT

## 2023-07-31 PROCEDURE — 92526 ORAL FUNCTION THERAPY: CPT

## 2023-07-31 PROCEDURE — 6370000000 HC RX 637 (ALT 250 FOR IP)

## 2023-07-31 PROCEDURE — 2700000000 HC OXYGEN THERAPY PER DAY

## 2023-07-31 PROCEDURE — 97530 THERAPEUTIC ACTIVITIES: CPT

## 2023-07-31 PROCEDURE — 99232 SBSQ HOSP IP/OBS MODERATE 35: CPT | Performed by: INTERNAL MEDICINE

## 2023-07-31 PROCEDURE — 97535 SELF CARE MNGMENT TRAINING: CPT

## 2023-07-31 PROCEDURE — 6370000000 HC RX 637 (ALT 250 FOR IP): Performed by: STUDENT IN AN ORGANIZED HEALTH CARE EDUCATION/TRAINING PROGRAM

## 2023-07-31 PROCEDURE — 99232 SBSQ HOSP IP/OBS MODERATE 35: CPT

## 2023-07-31 PROCEDURE — 97116 GAIT TRAINING THERAPY: CPT

## 2023-07-31 PROCEDURE — 36415 COLL VENOUS BLD VENIPUNCTURE: CPT

## 2023-07-31 PROCEDURE — 97162 PT EVAL MOD COMPLEX 30 MIN: CPT

## 2023-07-31 PROCEDURE — 2580000003 HC RX 258: Performed by: STUDENT IN AN ORGANIZED HEALTH CARE EDUCATION/TRAINING PROGRAM

## 2023-07-31 PROCEDURE — 97166 OT EVAL MOD COMPLEX 45 MIN: CPT

## 2023-07-31 PROCEDURE — 94660 CPAP INITIATION&MGMT: CPT

## 2023-07-31 PROCEDURE — 85027 COMPLETE CBC AUTOMATED: CPT

## 2023-07-31 PROCEDURE — 94761 N-INVAS EAR/PLS OXIMETRY MLT: CPT

## 2023-07-31 RX ORDER — OLANZAPINE 15 MG/1
15 TABLET ORAL 2 TIMES DAILY
Status: ON HOLD | COMMUNITY
End: 2023-08-01 | Stop reason: SDUPTHER

## 2023-07-31 RX ORDER — OLANZAPINE 5 MG/1
15 TABLET ORAL 2 TIMES DAILY
Status: DISCONTINUED | OUTPATIENT
Start: 2023-07-31 | End: 2023-08-01 | Stop reason: HOSPADM

## 2023-07-31 RX ORDER — CHLORPROMAZINE HYDROCHLORIDE 50 MG/1
50 TABLET, FILM COATED ORAL 2 TIMES DAILY
Status: ON HOLD | COMMUNITY
End: 2023-08-01 | Stop reason: SDUPTHER

## 2023-07-31 RX ORDER — AMOXICILLIN 250 MG
2 CAPSULE ORAL NIGHTLY
Status: ON HOLD | COMMUNITY
End: 2023-08-01 | Stop reason: HOSPADM

## 2023-07-31 RX ORDER — GUAIFENESIN 600 MG/1
1200 TABLET, EXTENDED RELEASE ORAL 2 TIMES DAILY
Status: ON HOLD | COMMUNITY
End: 2023-08-01 | Stop reason: SDUPTHER

## 2023-07-31 RX ORDER — LORATADINE 10 MG/1
10 TABLET ORAL DAILY
Status: ON HOLD | COMMUNITY
End: 2023-08-01 | Stop reason: SDUPTHER

## 2023-07-31 RX ORDER — OMEPRAZOLE 20 MG/1
20 CAPSULE, DELAYED RELEASE ORAL DAILY
Status: ON HOLD | COMMUNITY
End: 2023-08-01 | Stop reason: SDUPTHER

## 2023-07-31 RX ORDER — OXCARBAZEPINE 300 MG/1
300 TABLET, FILM COATED ORAL 3 TIMES DAILY
Status: ON HOLD | COMMUNITY
End: 2023-08-01 | Stop reason: SDUPTHER

## 2023-07-31 RX ORDER — OXCARBAZEPINE 150 MG/1
300 TABLET, FILM COATED ORAL 3 TIMES DAILY
Status: DISCONTINUED | OUTPATIENT
Start: 2023-07-31 | End: 2023-08-01 | Stop reason: HOSPADM

## 2023-07-31 RX ORDER — HEPARIN SODIUM 5000 [USP'U]/ML
5000 INJECTION, SOLUTION INTRAVENOUS; SUBCUTANEOUS EVERY 8 HOURS SCHEDULED
Status: DISCONTINUED | OUTPATIENT
Start: 2023-07-31 | End: 2023-08-01 | Stop reason: HOSPADM

## 2023-07-31 RX ADMIN — CHLORPROMAZINE HYDROCHLORIDE 25 MG: 25 TABLET, FILM COATED ORAL at 10:09

## 2023-07-31 RX ADMIN — SODIUM CHLORIDE, PRESERVATIVE FREE 10 ML: 5 INJECTION INTRAVENOUS at 10:10

## 2023-07-31 RX ADMIN — SODIUM CHLORIDE, PRESERVATIVE FREE 10 ML: 5 INJECTION INTRAVENOUS at 10:12

## 2023-07-31 RX ADMIN — OXCARBAZEPINE 300 MG: 150 TABLET, FILM COATED ORAL at 20:49

## 2023-07-31 RX ADMIN — ATORVASTATIN CALCIUM 80 MG: 80 TABLET, FILM COATED ORAL at 20:49

## 2023-07-31 RX ADMIN — OXCARBAZEPINE 300 MG: 150 TABLET, FILM COATED ORAL at 08:53

## 2023-07-31 RX ADMIN — ASPIRIN 81 MG: 81 TABLET, CHEWABLE ORAL at 01:10

## 2023-07-31 RX ADMIN — OLANZAPINE 15 MG: 5 TABLET, FILM COATED ORAL at 20:49

## 2023-07-31 RX ADMIN — SODIUM CHLORIDE, PRESERVATIVE FREE 10 ML: 5 INJECTION INTRAVENOUS at 20:49

## 2023-07-31 RX ADMIN — HEPARIN SODIUM 5000 UNITS: 5000 INJECTION INTRAVENOUS; SUBCUTANEOUS at 10:10

## 2023-07-31 RX ADMIN — SODIUM CHLORIDE, PRESERVATIVE FREE 10 ML: 5 INJECTION INTRAVENOUS at 20:50

## 2023-07-31 RX ADMIN — CHLORPROMAZINE HYDROCHLORIDE 25 MG: 25 TABLET, FILM COATED ORAL at 21:15

## 2023-07-31 RX ADMIN — PANTOPRAZOLE SODIUM 40 MG: 40 TABLET, DELAYED RELEASE ORAL at 06:49

## 2023-07-31 RX ADMIN — HEPARIN SODIUM 5000 UNITS: 5000 INJECTION INTRAVENOUS; SUBCUTANEOUS at 22:13

## 2023-07-31 RX ADMIN — CARVEDILOL 25 MG: 25 TABLET, FILM COATED ORAL at 08:54

## 2023-07-31 RX ADMIN — CARVEDILOL 25 MG: 25 TABLET, FILM COATED ORAL at 17:13

## 2023-07-31 ASSESSMENT — ENCOUNTER SYMPTOMS
VOMITING: 0
ABDOMINAL PAIN: 0
NAUSEA: 0

## 2023-07-31 ASSESSMENT — PAIN SCALES - GENERAL
PAINLEVEL_OUTOF10: 0

## 2023-07-31 NOTE — DISCHARGE INSTRUCTIONS
[facility] Stroke Program Survey  The [facility] values your feedback related to your recent hospital visit and admission. We strive to improve our Neuroscience program to promote better outcomes and recoveries for all our patients. The anonymous survey below consists of a few questions that are related to your stay and around your Stroke diagnosis, treatment, and recovery. It is anonymous and has only a few questions. The estimated length of time needed to complete this survey is 3 minutes or less. Thank you for completing this survey! Please follow up with Neurology in three months and PCP in one week after discharge.

## 2023-07-31 NOTE — CARE COORDINATION
Case Management Assessment  Initial Evaluation    Date/Time of Evaluation: 7/31/2023 9:56 AM  Assessment Completed by: Alejandra Carl RN    If patient is discharged prior to next notation, then this note serves as note for discharge by case management. Patient Name: Archana Aguirre                   YOB: 1955  Diagnosis: Acute CVA (cerebrovascular accident) [facility]) [I63.9]  Cerebrovascular accident (CVA), unspecified mechanism ([address]) [I63.9]                   Date / Time: 7/29/2023 10:54 PM    Patient Admission Status: Inpatient   Readmission Risk (Low < 19, Mod (19-27), High > 27): Readmission Risk Score: 16.7    Current PCP: Will Recio MD  PCP verified by CM? Yes    Chart Reviewed: Yes      History Provided by: Medical Record, Other (see comment) (facility staff)  Patient Orientation: Person, Place, Situation    Patient Cognition: Alert    Hospitalization in the last 30 days (Readmission):  No    If yes, Readmission Assessment in  Navigator will be completed. Advance Directives:      Code Status: Full Code   Patient's Primary Decision Maker is: Legal Next of Kin    Primary Decision MakerTristen Carlos Brother/Sister - [phone]    Discharge Planning:    Patient lives with: Other (Comment) (facility resident) Type of Home: [address] [place] [address])  Address: [address] [place], [place] [address], [address] [place] [address] Avenue  Phone: [phone]    Primary Care Giver:  Other (Comment) (long-term care resident at [address] [place] [address])  Patient Support Systems include: Family Members, Other (Comment) (care facility staff)   Current Financial resources: Medicare, Medicaid  Current community resources: ECF/Home Care  Current services prior to admission: Extended Care Facility            Current DME:              Type of Home Care services:  Nursing Services    ADLS  Prior functional level: Assistance with the following:, Cooking, Housework, Mobility, Shopping, Bathing, Dressing (needs encouragement and

## 2023-07-31 NOTE — PLAN OF CARE
Problem: Discharge Planning  Goal: Discharge to home or other facility with appropriate resources  Outcome: Progressing  Flowsheets (Taken 7/31/2023 0800)  Discharge to home or other facility with appropriate resources:   Identify barriers to discharge with patient and caregiver   Arrange for needed discharge resources and transportation as appropriate   Arrange for interpreters to assist at discharge as needed   Identify discharge learning needs (meds, wound care, etc)   Refer to discharge planning if patient needs post-hospital services based on physician order or complex needs related to functional status, cognitive ability or social support system     Problem: Pain  Goal: Verbalizes/displays adequate comfort level or baseline comfort level  Outcome: Progressing  Flowsheets (Taken 7/31/2023 0800)  Verbalizes/displays adequate comfort level or baseline comfort level:   Encourage patient to monitor pain and request assistance   Assess pain using appropriate pain scale   Administer analgesics based on type and severity of pain and evaluate response   Implement non-pharmacological measures as appropriate and evaluate response   Consider cultural and social influences on pain and pain management   Notify Licensed Independent Practitioner if interventions unsuccessful or patient reports new pain     Problem: Neurosensory - Adult  Goal: Achieves stable or improved neurological status  Outcome: Progressing  Flowsheets (Taken 7/31/2023 0800)  Achieves stable or improved neurological status:   Assess for and report changes in neurological status   Initiate measures to prevent increased intracranial pressure   Maintain blood pressure and fluid volume within ordered parameters to optimize cerebral perfusion and minimize risk of hemorrhage   Monitor temperature, glucose, and sodium.  Initiate appropriate interventions as ordered  Goal: Absence of seizures  Outcome: Progressing  Flowsheets (Taken 7/31/2023 0800)  Absence of

## 2023-07-31 NOTE — NURSE NAVIGATOR
NAME:  Sharlon Gowers  YOB: 1955  MEDICAL RECORD NUMBER:  [id number]  TODAYS DATE:  7/31/2023    Discussed personal risk factors for Stroke/TIA with patient/family, and ways to reduce the risk for a recurrent stroke. Patient's personal risk factors which were identified are:     [] Alcohol Abuse: check with your physician before any alcohol consumption. [] Atrial fibrillation: may cause blood clots. [] Drug Abuse: Seek help, talk with your doctor  [] Clotting Disorder  [x] Diabetes  [x] History of stroke or heart disease  [x] High Blood Pressure/Hypertension: work with your physician. [x] High cholesterol: monitor cholesterol levels with your physician. [x] Overweight/Obesity: work with your physician for your ideal body weight. [x] Physical Inactivity: get regular exercise as directed by your physician. [] Personal history of previous TIA or stroke  [] Poor Diet; decrease salt (sodium) in your diet, follow diet directed by physician. [] Smoking: Cigarette/Cigar: stop smoking. Advised pt. that you can reduce your risk for stroke/TIA by modifying/controlling the risk factors that you have. Pt.advised to take the medications as prescribed, which will be detailed in the discharge instructions, and to not stop taking them without consulting their physician. In addition, pt. advised to maintain a healthy diet, exercise regularly and to not smoke. [facility]'s Stroke treatment and prevention, Managing your recovery  notebook  provided and/or reviewed  with patient/family. The notebook includes, but not limited to, sections addressing warning signs & symptoms of a stroke, which are: sudden numbness or weakness especially on one side of the body, sudden confusion, difficulty speaking or understanding, sudden changes in vision, sudden dizziness or loss of balance/ coordination, sudden severe headache, syncope and seizure.   The need to call EMS (911) immediately if signs & symptoms

## 2023-08-01 LAB
ALBUMIN SERPL-MCNC: 4 G/DL (ref 3.4–5)
ANION GAP SERPL CALCULATED.3IONS-SCNC: 9 MMOL/L (ref 3–16)
BUN SERPL-MCNC: 16 MG/DL (ref 7–20)
CALCIUM SERPL-MCNC: 10.2 MG/DL (ref 8.3–10.6)
CHLORIDE SERPL-SCNC: 106 MMOL/L (ref 99–110)
CO2 SERPL-SCNC: 27 MMOL/L (ref 21–32)
CREAT SERPL-MCNC: 1.3 MG/DL (ref 0.8–1.3)
DEPRECATED RDW RBC AUTO: 15.1 % (ref 12.4–15.4)
GFR SERPLBLD CREATININE-BSD FMLA CKD-EPI: 59 ML/MIN/{1.73_M2}
GLUCOSE SERPL-MCNC: 87 MG/DL (ref 70–99)
HCT VFR BLD AUTO: 38.1 % (ref 40.5–52.5)
HGB BLD-MCNC: 12.5 G/DL (ref 13.5–17.5)
LV EF: 28 %
LVEF MODALITY: NORMAL
MAGNESIUM SERPL-MCNC: 1.9 MG/DL (ref 1.8–2.4)
MCH RBC QN AUTO: 29.2 PG (ref 26–34)
MCHC RBC AUTO-ENTMCNC: 32.9 G/DL (ref 31–36)
MCV RBC AUTO: 89 FL (ref 80–100)
PHOSPHATE SERPL-MCNC: 3 MG/DL (ref 2.5–4.9)
PLATELET # BLD AUTO: 216 K/UL (ref 135–450)
PMV BLD AUTO: 9.1 FL (ref 5–10.5)
POTASSIUM SERPL-SCNC: 4 MMOL/L (ref 3.5–5.1)
RBC # BLD AUTO: 4.28 M/UL (ref 4.2–5.9)
SODIUM SERPL-SCNC: 142 MMOL/L (ref 136–145)
WBC # BLD AUTO: 6.1 K/UL (ref 4–11)

## 2023-08-01 PROCEDURE — 6360000002 HC RX W HCPCS: Performed by: INTERNAL MEDICINE

## 2023-08-01 PROCEDURE — 6370000000 HC RX 637 (ALT 250 FOR IP): Performed by: STUDENT IN AN ORGANIZED HEALTH CARE EDUCATION/TRAINING PROGRAM

## 2023-08-01 PROCEDURE — 6370000000 HC RX 637 (ALT 250 FOR IP)

## 2023-08-01 PROCEDURE — C8929 TTE W OR WO FOL WCON,DOPPLER: HCPCS

## 2023-08-01 PROCEDURE — 83735 ASSAY OF MAGNESIUM: CPT

## 2023-08-01 PROCEDURE — 6360000004 HC RX CONTRAST MEDICATION: Performed by: STUDENT IN AN ORGANIZED HEALTH CARE EDUCATION/TRAINING PROGRAM

## 2023-08-01 PROCEDURE — 36415 COLL VENOUS BLD VENIPUNCTURE: CPT

## 2023-08-01 PROCEDURE — 94660 CPAP INITIATION&MGMT: CPT

## 2023-08-01 PROCEDURE — 80069 RENAL FUNCTION PANEL: CPT

## 2023-08-01 PROCEDURE — 85027 COMPLETE CBC AUTOMATED: CPT

## 2023-08-01 PROCEDURE — 99232 SBSQ HOSP IP/OBS MODERATE 35: CPT | Performed by: NURSE PRACTITIONER

## 2023-08-01 RX ORDER — CHLORPROMAZINE HYDROCHLORIDE 50 MG/1
50 TABLET, FILM COATED ORAL 2 TIMES DAILY
Qty: 60 TABLET | Refills: 3 | Status: SHIPPED | OUTPATIENT
Start: 2023-08-01 | End: 2023-08-01 | Stop reason: SDUPTHER

## 2023-08-01 RX ORDER — LORATADINE 10 MG/1
10 TABLET ORAL DAILY
Qty: 30 TABLET | Refills: 3 | Status: SHIPPED | OUTPATIENT
Start: 2023-08-01

## 2023-08-01 RX ORDER — LOSARTAN POTASSIUM 25 MG/1
12.5 TABLET ORAL DAILY
Qty: 30 TABLET | Refills: 3 | Status: SHIPPED | OUTPATIENT
Start: 2023-08-02

## 2023-08-01 RX ORDER — CARVEDILOL 25 MG/1
25 TABLET ORAL 2 TIMES DAILY WITH MEALS
Qty: 60 TABLET | Refills: 3 | Status: SHIPPED | OUTPATIENT
Start: 2023-08-01 | End: 2023-08-01 | Stop reason: SDUPTHER

## 2023-08-01 RX ORDER — OXCARBAZEPINE 300 MG/1
300 TABLET, FILM COATED ORAL 3 TIMES DAILY
Qty: 60 TABLET | Refills: 3 | Status: SHIPPED | OUTPATIENT
Start: 2023-08-01 | End: 2023-08-01 | Stop reason: SDUPTHER

## 2023-08-01 RX ORDER — SPIRONOLACTONE 25 MG/1
12.5 TABLET ORAL DAILY
Qty: 30 TABLET | Refills: 3 | Status: SHIPPED | OUTPATIENT
Start: 2023-08-02 | End: 2023-08-01 | Stop reason: SDUPTHER

## 2023-08-01 RX ORDER — LORATADINE 10 MG/1
10 TABLET ORAL DAILY
Qty: 30 TABLET | Refills: 3 | Status: SHIPPED | OUTPATIENT
Start: 2023-08-01 | End: 2023-08-01 | Stop reason: SDUPTHER

## 2023-08-01 RX ORDER — SPIRONOLACTONE 25 MG/1
25 TABLET ORAL DAILY
Status: DISCONTINUED | OUTPATIENT
Start: 2023-08-01 | End: 2023-08-01 | Stop reason: HOSPADM

## 2023-08-01 RX ORDER — GUAIFENESIN 600 MG/1
1200 TABLET, EXTENDED RELEASE ORAL 2 TIMES DAILY
Qty: 60 TABLET | Refills: 3 | Status: SHIPPED | OUTPATIENT
Start: 2023-08-01 | End: 2023-08-01 | Stop reason: SDUPTHER

## 2023-08-01 RX ORDER — ASPIRIN 81 MG/1
81 TABLET, CHEWABLE ORAL DAILY
Qty: 30 TABLET | Refills: 3 | Status: SHIPPED | OUTPATIENT
Start: 2023-08-02 | End: 2023-08-01 | Stop reason: SDUPTHER

## 2023-08-01 RX ORDER — ATORVASTATIN CALCIUM 80 MG/1
80 TABLET, FILM COATED ORAL NIGHTLY
Qty: 30 TABLET | Refills: 3 | Status: SHIPPED | OUTPATIENT
Start: 2023-08-01 | End: 2023-08-01 | Stop reason: SDUPTHER

## 2023-08-01 RX ORDER — ATORVASTATIN CALCIUM 80 MG/1
80 TABLET, FILM COATED ORAL NIGHTLY
Qty: 30 TABLET | Refills: 3 | Status: SHIPPED | OUTPATIENT
Start: 2023-08-01

## 2023-08-01 RX ORDER — GUAIFENESIN 600 MG/1
1200 TABLET, EXTENDED RELEASE ORAL 2 TIMES DAILY
Qty: 60 TABLET | Refills: 3 | Status: SHIPPED | OUTPATIENT
Start: 2023-08-01

## 2023-08-01 RX ORDER — SPIRONOLACTONE 25 MG/1
12.5 TABLET ORAL DAILY
Qty: 30 TABLET | Refills: 3 | Status: SHIPPED | OUTPATIENT
Start: 2023-08-02

## 2023-08-01 RX ORDER — CARVEDILOL 25 MG/1
25 TABLET ORAL 2 TIMES DAILY WITH MEALS
Qty: 60 TABLET | Refills: 3 | Status: SHIPPED | OUTPATIENT
Start: 2023-08-01

## 2023-08-01 RX ORDER — LOSARTAN POTASSIUM 25 MG/1
12.5 TABLET ORAL DAILY
Qty: 30 TABLET | Refills: 3 | Status: SHIPPED | OUTPATIENT
Start: 2023-08-02 | End: 2023-08-01 | Stop reason: SDUPTHER

## 2023-08-01 RX ORDER — OMEPRAZOLE 20 MG/1
20 CAPSULE, DELAYED RELEASE ORAL DAILY
Qty: 30 CAPSULE | Refills: 3 | Status: SHIPPED | OUTPATIENT
Start: 2023-08-01

## 2023-08-01 RX ORDER — POLYETHYLENE GLYCOL 3350 17 G/17G
17 POWDER, FOR SOLUTION ORAL ONCE
Status: COMPLETED | OUTPATIENT
Start: 2023-08-01 | End: 2023-08-01

## 2023-08-01 RX ORDER — ASPIRIN 81 MG/1
81 TABLET, CHEWABLE ORAL DAILY
Qty: 30 TABLET | Refills: 3 | Status: SHIPPED | OUTPATIENT
Start: 2023-08-02

## 2023-08-01 RX ORDER — OLANZAPINE 15 MG/1
15 TABLET ORAL 2 TIMES DAILY
Qty: 30 TABLET | Refills: 3 | Status: SHIPPED | OUTPATIENT
Start: 2023-08-01 | End: 2023-08-01 | Stop reason: SDUPTHER

## 2023-08-01 RX ORDER — OLANZAPINE 15 MG/1
15 TABLET ORAL 2 TIMES DAILY
Qty: 30 TABLET | Refills: 3 | Status: SHIPPED | OUTPATIENT
Start: 2023-08-01

## 2023-08-01 RX ORDER — OMEPRAZOLE 20 MG/1
20 CAPSULE, DELAYED RELEASE ORAL DAILY
Qty: 30 CAPSULE | Refills: 3 | Status: SHIPPED | OUTPATIENT
Start: 2023-08-01 | End: 2023-08-01 | Stop reason: SDUPTHER

## 2023-08-01 RX ORDER — LOSARTAN POTASSIUM 25 MG/1
25 TABLET ORAL DAILY
Status: DISCONTINUED | OUTPATIENT
Start: 2023-08-01 | End: 2023-08-01 | Stop reason: HOSPADM

## 2023-08-01 RX ORDER — SPIRONOLACTONE 25 MG/1
25 TABLET ORAL DAILY
Qty: 30 TABLET | Refills: 3 | Status: SHIPPED | OUTPATIENT
Start: 2023-08-02 | End: 2023-08-01 | Stop reason: SDUPTHER

## 2023-08-01 RX ORDER — POLYETHYLENE GLYCOL 3350 17 G/17G
17 POWDER, FOR SOLUTION ORAL DAILY PRN
Status: DISCONTINUED | OUTPATIENT
Start: 2023-08-01 | End: 2023-08-01 | Stop reason: HOSPADM

## 2023-08-01 RX ORDER — CHLORPROMAZINE HYDROCHLORIDE 50 MG/1
50 TABLET, FILM COATED ORAL 2 TIMES DAILY
Qty: 60 TABLET | Refills: 3 | Status: SHIPPED | OUTPATIENT
Start: 2023-08-01

## 2023-08-01 RX ORDER — LOSARTAN POTASSIUM 25 MG/1
25 TABLET ORAL DAILY
Qty: 30 TABLET | Refills: 3 | Status: SHIPPED | OUTPATIENT
Start: 2023-08-02 | End: 2023-08-01 | Stop reason: SDUPTHER

## 2023-08-01 RX ORDER — OXCARBAZEPINE 300 MG/1
300 TABLET, FILM COATED ORAL 3 TIMES DAILY
Qty: 60 TABLET | Refills: 3 | Status: SHIPPED | OUTPATIENT
Start: 2023-08-01

## 2023-08-01 RX ADMIN — HEPARIN SODIUM 5000 UNITS: 5000 INJECTION INTRAVENOUS; SUBCUTANEOUS at 05:56

## 2023-08-01 RX ADMIN — PANTOPRAZOLE SODIUM 40 MG: 40 TABLET, DELAYED RELEASE ORAL at 05:56

## 2023-08-01 RX ADMIN — OXCARBAZEPINE 300 MG: 150 TABLET, FILM COATED ORAL at 07:59

## 2023-08-01 RX ADMIN — OXCARBAZEPINE 300 MG: 150 TABLET, FILM COATED ORAL at 14:44

## 2023-08-01 RX ADMIN — CHLORPROMAZINE HYDROCHLORIDE 25 MG: 25 TABLET, FILM COATED ORAL at 10:40

## 2023-08-01 RX ADMIN — CARVEDILOL 25 MG: 25 TABLET, FILM COATED ORAL at 08:00

## 2023-08-01 RX ADMIN — LOSARTAN POTASSIUM 25 MG: 25 TABLET, FILM COATED ORAL at 11:22

## 2023-08-01 RX ADMIN — EMPAGLIFLOZIN 10 MG: 10 TABLET, FILM COATED ORAL at 11:22

## 2023-08-01 RX ADMIN — SPIRONOLACTONE 25 MG: 25 TABLET, FILM COATED ORAL at 11:22

## 2023-08-01 RX ADMIN — OLANZAPINE 15 MG: 5 TABLET, FILM COATED ORAL at 07:59

## 2023-08-01 RX ADMIN — ASPIRIN 81 MG: 81 TABLET, CHEWABLE ORAL at 07:59

## 2023-08-01 RX ADMIN — POLYETHYLENE GLYCOL 3350 17 G: 17 POWDER, FOR SOLUTION ORAL at 10:40

## 2023-08-01 RX ADMIN — PERFLUTREN 1.5 ML: 6.52 INJECTION, SUSPENSION INTRAVENOUS at 16:30

## 2023-08-01 ASSESSMENT — PAIN SCALES - GENERAL
PAINLEVEL_OUTOF10: 0

## 2023-08-01 ASSESSMENT — ENCOUNTER SYMPTOMS
SHORTNESS OF BREATH: 0
BACK PAIN: 0
ABDOMINAL PAIN: 0

## 2023-08-01 NOTE — DISCHARGE SUMMARY
INTERNAL MEDICINE DEPARTMENT AT [address] [place] [address]  DISCHARGE SUMMARY    Patient ID: Sharon Shultz                                             Discharge Date: 8/1/2023   Patient's PCP: Becca Licona MD                                          Discharge Physician: Blanca Barriga MD MD  Admit Date: 7/29/2023   Admitting Physician: Laverne Fry MD    PROBLEMS DURING HOSPITALIZATION:  Present on Admission:   Acute right-sided weakness   Cerebrovascular accident (CVA) due to thrombosis of left middle cerebral artery ([address] )   Received intravenous tenecteplase (TNK) in emergency department   Cerebrovascular accident (CVA) ([address])      DISCHARGE DIAGNOSES:  Ischemic Stroke  HFrEF  Schizoaffective Disorder    HPI: Patient presented from his [facility]) after staff noticed he was unsteady on his feet, had right sided weakness, right facial droop and dysarthria. Last known well time had been 3 hours prior. On admission, CT scan of the head showed no signs of ischemic stroke but given clinical picture he was given TNK. He could not have an MRI done because he has a pacemaker. Throughout getting a CT scan and getting TNK, his symptoms gradually improved. The patient was started on ASA and Lipitor following this episode to prevent further stroke. During his hospitalization, he gradually improved to baseline over the course of a few days (his right foot drop is baseline according to daughter). Given his reduced EF of 30-35%, he was started on Losartan 25 mg QD, Aldactone 25 mg QD and Jardiance 10 mg QD in compliance with GDMT (already on Coreg). Patient had their pacemaker interrogated, which did not show any previous signs of Afib as the cause of his cryptogenic stroke. Patient also had an Echo, which showed an EF of 25-30% with no patent foramen ovale. He is to follow up with cardiology after discharge to discuss his new heart failure medications and worsening EF findings.     Patient was discharged back to his

## 2023-08-01 NOTE — DISCHARGE INSTR - COC
Continuity of Care Form    Patient Name: Reagan Martines   :  1955  MRN:  [id number]    Admit date:  2023  Discharge date:  ***    Code Status Order: Full Code   Advance Directives:     Admitting Physician:  Dave Hale MD  PCP: Melquiades Farah MD    Discharging Nurse: [facility] Unit/Room#: 0657/2965-05  Discharging Unit Phone Number: ***    Emergency Contact:   Extended Emergency Contact Information  Primary Emergency Contact: Raven Chatman  Address: [address] [place] [address] Phone: [phone]  Work Phone: [phone]  Relation: Brother/Sister    Past Surgical History:  Past Surgical History:   Procedure Laterality Date    COLONOSCOPY N/A 2020    COLONOSCOPY POLYPECTOMY SNARE/COLD BIOPSY performed by Kandra Severs, MD at [address]         Immunization History:   Immunization History   Administered Date(s) Administered    COVID-19, PFIZER Bivalent, DO NOT Dilute, (age 12y+), IM, 27 mcg/0.3 mL 2022       Active Problems:  Patient Active Problem List   Diagnosis Code    Respiratory failure ([address]) J96.90    Hypoxia R09.02    JONATHAN (obstructive sleep apnea) G47.33    Sepsis ([address]) A41.9    Fever R50.9    Encephalopathy G93.40    Aseptic meningitis G03.0    Pneumonia due to 2019-nCoV U07.1, J12.82    JOSÉ (acute kidney injury) ([address]) O86.0    Acute metabolic encephalopathy P90.29    Acute right-sided weakness R53.1    Cerebrovascular accident (CVA) due to thrombosis of left middle cerebral artery ([address]) V06.254    Received intravenous tenecteplase (TNK) in emergency department Z92.82    Cerebrovascular accident (CVA) ([address]) I63.9       Isolation/Infection:   Isolation            No Isolation          Patient Infection Status       Infection Onset Added Last Indicated Last Indicated By Review Planned Expiration Resolved Resolved By    None active    Resolved    COVID-19 10/30/20 11/01/20 10/30/20 COVID-19   20 Infection     COVID-19 (Rule

## 2023-08-01 NOTE — CARE COORDINATION
Case Management Assessment            Discharge Note                    Date / Time of Note: 8/1/2023 1:05 PM                  Discharge Note Completed by: Ming Latif RN    Mr. Virginia Brown will discharge back to [facility] today @ 1700p. RN CALL REPORT  [phone]    Patient Name: Kortney Chan   YOB: 1955  Diagnosis: Acute CVA (cerebrovascular accident) [facility]) [I63.9]  Cerebrovascular accident (CVA), unspecified mechanism ([address]) [I63.9]   Date / Time: 7/29/2023 10:54 PM    Current PCP: Michael Manjarrez MD    Advance Directives:  Code Status: Full Code    Financial:  Payor: MEDICARE / Plan: MEDICARE PART A AND B / Product Type: *No Product type* /      Pharmacy:    [address] [place] [address], [address] [place] [address] [address]  [address] [address]  Phone: [phone] Fax: [phone]    [id number] [phone] - F [phone]  [address] [place] [address] [address]  Phone: [phone] Fax: [phone]      Assistance purchasing medications?: Potential Assistance Purchasing Medications: No  Assistance provided by Case Management: None at this time      ADLS:  Current PT AM-PAC Score: 18 /24  Current OT AM-PAC Score: 19 /24    DISCHARGE Disposition:     [facility]  Address: [address] [place], [place] [address], [address] [place] [address] Avenue  Phone: [phone]    LOC at discharge: [address] [place] [address]    JUAN Completed: Yes    IMM Completed:   Yes, Case management has presented and reviewed IMM letter #2 to the patient and/or family/ POA. Patient and/or family/POA verbalized understanding of their medicare rights and appeal process if needed. Patient and/or family/POA has signed and placed today's date (8/1) and time (1300) on letter #2 on the the appropriate lines.  Patient and/or family/POA, provided copy of signed letter and they are aware that this original copy of IMM

## 2023-08-01 NOTE — CONSULTS
Clinical Pharmacy Progress Note  Medication History     Admit Date: 7/29/2023    Pharmacy consulted to verify home medication list by Dr Joana Rivera. List of of current medications patient is taking is complete. Home Medication list in EPIC updated to reflect changes noted below. Source of information: Nayeli Adams [phone] -- verbal list read by RN. Changes made to medication list:   Medications removed:   Calcium carbonate 500mg chews prn  Mucinex DM prn  Fluocinonide 0.05% ext sol  Hydrocortisone 1% cr prm  Duoneb prn  Ketoconazole cr prn  Methocarbamol 500mg TID prn  Omega 3 FA daily  Preparation H top prn  Miralax prn  Aspercreme cr prn  Medications added:   Mucinex   Loratadine  Hyoscyamine prn secretions  Medication doses adjusted:   Olanzapine - changed from 10mg QHS to 15mg BID  Oxcarbazepine - changed from 300mg BID to TID  Other notes:   PS sent to Dr Joana Rivera to discuss changes. Current Outpatient Medications   Medication Instructions    carvedilol (COREG) 25 mg, Oral, 2 TIMES DAILY WITH MEALS,      chlorproMAZINE (THORAZINE) 50 mg, Oral, 2 TIMES DAILY    guaiFENesin (MUCINEX) 1,200 mg, Oral, 2 TIMES DAILY    hyoscyamine (LEVSIN/SL) 125 mcg, SubLINGual, EVERY 4 HOURS PRN    loratadine (CLARITIN) 10 mg, Oral, DAILY    OLANZapine (ZYPREXA) 15 mg, Oral, 2 times daily    omeprazole (PRILOSEC) 20 mg, Oral, DAILY    OXcarbazepine (TRILEPTAL) 300 mg, Oral, 3 times daily    senna-docusate (PERICOLACE) 8.6-50 MG per tablet 2 tablets, Oral, Nightly       Please call with any questions.   Isabelle CalderaD., BCPS   7/31/2023 2:17 PM  Wireless: [phone]
ICU HISTORY AND PHYSICAL        Hospital Day: 1  ICU Day:         1                                                                            Code:Prior  Admit Date: 7/29/2023                       PCP: Claudia Villalba MD                                   CC: weakness, dysarthria     HISTORY OF PRESENT ILLNESS:   Tarik Funk is a 70 y.o. male with no prior history of CVA, history of CAD, hypertension, diabetes, JONATHAN who presents from his facility for concern of stroke. Known well was around 7 to 7:30 PM today. At around 10:15 PM, an aide entered his room and he was seen standing up and was very unsteady on his feet. He was found to have significant right upper and right lower extremity weakness, right-sided facial droop, severe dysarthria. For EMS, his glucose was normal with stable vital signs with systolics in the 926J. Patient is severely dysarthric on exam initially, but this improved slightly during his 45-minute course here. He is reassessed and interviewed and he says he has no pain anywhere head to toe. He currently has no chest pain, shortness of breath, nausea, vomiting. Had no prodromal symptoms. He denies any prior history of stroke. PAST HISTORY:      Past Medical History        Past Medical History:   Diagnosis Date    Anemia      CAD (coronary artery disease)      Depression      Diabetes mellitus (HCC)      GERD (gastroesophageal reflux disease)      Hypertension      Left anterior fascicular block      JONATHAN (obstructive sleep apnea)      Pacemaker      Paranoid schizophrenia ([address])      Pneumonia due to 2019-nCoV 10/10/2020    Rash      Right bundle branch block      Sinus bradycardia      Sinusitis              Past Surgical History         Past Surgical History:   Procedure Laterality Date    COLONOSCOPY N/A 11/2/2020     COLONOSCOPY POLYPECTOMY SNARE/COLD BIOPSY performed by Leatha Caceres MD at [address] [facility],7Gws:    The
ears daily. dextromethorphan-guaiFENesin (MUCINEX DM)  MG per extended release tablet Take 1 tablet by mouth every 12 hours as needed      phenylephrine-cocoa butter (PREPARATION H) 0.25-88.44 % Place 1 suppository rectally every 8 hours as needed for Hemorrhoids       senna (SENOKOT) 8.6 MG tablet Take 2 tablets by mouth every evening      trolamine salicylate (ASPERCREME) 10 % cream Apply topically to left shoulder 2 times daily as needed. Omega 3 1000 MG CAPS Take 1 capsule by mouth daily       hydrocortisone 1 % cream Apply to face daily as needed for itchy skin      carvedilol (COREG) 25 MG tablet Take 25 mg by mouth 2 times daily (with meals). Review of Systems         Physical Exam     Vitals:    23 2309   BP: (!) 154/101   Pulse: 71   Resp: 18   SpO2: 97%              NIH Stroke Scale    Time Performed: 11:27 PM        1a  Level of consciousness: 1 - not alert but arousable by minor stimulation to obey, answer or respond   1b. LOC questions:  0 - answers both questions correctly   1c. LOC commands: 0=Performs both tasks correctly   2. Best Gaze: 0=normal   3. Visual: 0=No visual loss   4. Facial Palsy: 2=Partial paralysis (total or near total paralysis of the lower face)   5a. Motor left arm: 0=No drift, limb holds 90 (or 45) degrees for full 10 seconds   5b. Motor right arm: 1=Drift, limb holds 90 (or 45) degrees but drifts down before full 10 seconds: does not hit bed   6a. motor left le=No drift, limb holds 90 (or 45) degrees for full 10 seconds   6b  Motor right le=Drift, limb holds 90 (or 45) degrees but drifts down before full 10 seconds: does not hit bed   7. Limb Ataxia: 0=Absent   8. Sensory: 0=Normal; no sensory loss   9. Best Language:  0 - no aphasia, normal   10. Dysarthria: 1=Mild to moderate, patient slurs at least some words and at worst, can be understood with some difficulty   11.  Extinction and Inattention: 0=No abnormality         Total:   6
abnormality  Total: 3     (Noted to be NIHSS of 6 in stroke team notes)     Diagnostic Data Reviewed    IMAGES:  Images personally reviewed and agree w/ radiology interpretation of Head CT / CTA       IMPRESSION:     No acute intracranial process. Critical result called to the patient's nurse in the emergency department at  11:09 p.m. 7/29/2023    IMPRESSION:     No left vertebral artery is identified, either occluded at its origin or  markedly diminutive in size. Otherwise normal CTA of the neck without atherosclerotic disease of the right  vertebral artery or carotid vasculature. IMPRESSION:     No distal left vertebral artery again identified, either occluded at its origin  or markedly diminutive in size. Minimal calcified atherosclerotic plaque of the distal internal carotid arteries  without significant luminal narrowing. LABS:  All results below personally reviewed. Pertinent positives & negatives are addressed in Impression & Recommendations below.      LABS   Metabolic Panel Recent Labs     07/30/23  0330      K 5.9*      CO2 26   BUN 17   CREATININE 1.4*   GLUCOSE 86   CALCIUM 9.9   LABALBU 3.9   ALKPHOS 94   ALT 13   AST 22      CBC / Coags Recent Labs     07/30/23  0330   WBC 5.6   RBC 3.96*   HGB 11.4*   HCT 35.7*      INR 1.02      Other Lab Results   Component Value Date/Time    LABSALI <1.0 01/01/2010 08:04 AM    COVID19 DETECTED 10/30/2020 08:45 PM     Lab Results   Component Value Date/Time    LACTSEPSIS 0.7 10/07/2020 12:27 PM          CURRENT SCHEDULED MEDICATIONS   Inpatient Medications     [Held by provider] carvedilol, 25 mg, Oral, BID WC    [Held by provider] chlorproMAZINE, 25 mg, Oral, BID    [Held by provider] OLANZapine, 10 mg, Oral, Nightly    [Held by provider] pantoprazole, 40 mg, Oral, QAM AC    [Held by provider] OXcarbazepine, 300 mg, Oral, BID    sodium chloride flush, 5-40 mL, IntraVENous, 2 times per day    [START ON 7/31/2023] aspirin, 81
with primary team    Thank you for this consult. Please contact me with any questions or concerns. Patricia Pruett PGY3      This patient has been seen, examined, and discussed with the resident. I was part of the key critical services provided to the patient. I agree with the residents documentation. This note may have been altered to reflect my own examination findings, impression, and recommendations.        TRAV PersaudP.H  PM&R  8/1/2023  10:46 AM

## 2023-08-06 VITALS
DIASTOLIC BLOOD PRESSURE: 97 MMHG | BODY MASS INDEX: 25.75 KG/M2 | TEMPERATURE: 98.2 F | HEIGHT: 70 IN | WEIGHT: 179.9 LBS | RESPIRATION RATE: 16 BRPM | HEART RATE: 60 BPM | SYSTOLIC BLOOD PRESSURE: 144 MMHG | OXYGEN SATURATION: 96 %

## 2024-02-09 NOTE — ED NOTES
In an effort to ensure that our patients [employer], a Team Member has reviewed your chart and identified an opportunity to provide the best care possible. An attempt was made to discuss or schedule overdue Preventive or Disease Management screening.     The Outcome was Contact was made, appointment declined. Care Gaps include Breast Cancer Screening, Colorectal Cancer Screening, and Immunizations.     Report given to RN 4th floor     Robert Chiang RN  10/07/20 [id number]

## (undated) DEVICE — SNARE ENDOSCP L240CM SHTH DIA24MM LOOP W10MM POLYP RND REINF

## (undated) DEVICE — TRAP SPEC RETRV CLR PLAS POLYP IN LN SUCT QUIK CTCH

## (undated) DEVICE — CANNULA SAMP CO2 AD GRN 7FT CO2 AND 7FT O2 TBNG UNIV CONN